# Patient Record
Sex: FEMALE | Race: WHITE | NOT HISPANIC OR LATINO | Employment: OTHER | URBAN - METROPOLITAN AREA
[De-identification: names, ages, dates, MRNs, and addresses within clinical notes are randomized per-mention and may not be internally consistent; named-entity substitution may affect disease eponyms.]

---

## 2017-07-12 ENCOUNTER — RECORDS - HEALTHEAST (OUTPATIENT)
Dept: LAB | Facility: CLINIC | Age: 77
End: 2017-07-12

## 2017-07-12 LAB
CHOLEST SERPL-MCNC: 204 MG/DL
FASTING STATUS PATIENT QL REPORTED: ABNORMAL
HDLC SERPL-MCNC: 83 MG/DL
LDLC SERPL CALC-MCNC: 108 MG/DL
TRIGL SERPL-MCNC: 65 MG/DL

## 2019-06-26 ENCOUNTER — RECORDS - HEALTHEAST (OUTPATIENT)
Dept: LAB | Facility: CLINIC | Age: 79
End: 2019-06-26

## 2019-06-26 LAB
ALBUMIN SERPL-MCNC: 3.8 G/DL (ref 3.5–5)
ALP SERPL-CCNC: 69 U/L (ref 45–120)
ALT SERPL W P-5'-P-CCNC: 22 U/L (ref 0–45)
ANION GAP SERPL CALCULATED.3IONS-SCNC: 12 MMOL/L (ref 5–18)
AST SERPL W P-5'-P-CCNC: 20 U/L (ref 0–40)
BILIRUB SERPL-MCNC: 1.1 MG/DL (ref 0–1)
BUN SERPL-MCNC: 22 MG/DL (ref 8–28)
CALCIUM SERPL-MCNC: 10.4 MG/DL (ref 8.5–10.5)
CHLORIDE BLD-SCNC: 103 MMOL/L (ref 98–107)
CHOLEST SERPL-MCNC: 221 MG/DL
CO2 SERPL-SCNC: 25 MMOL/L (ref 22–31)
CREAT SERPL-MCNC: 0.85 MG/DL (ref 0.6–1.1)
FASTING STATUS PATIENT QL REPORTED: ABNORMAL
GFR SERPL CREATININE-BSD FRML MDRD: >60 ML/MIN/1.73M2
GLUCOSE BLD-MCNC: 96 MG/DL (ref 70–125)
HDLC SERPL-MCNC: 97 MG/DL
LDLC SERPL CALC-MCNC: 114 MG/DL
POTASSIUM BLD-SCNC: 4.9 MMOL/L (ref 3.5–5)
PROT SERPL-MCNC: 7 G/DL (ref 6–8)
SODIUM SERPL-SCNC: 140 MMOL/L (ref 136–145)
TRIGL SERPL-MCNC: 52 MG/DL
TSH SERPL DL<=0.005 MIU/L-ACNC: 2.63 UIU/ML (ref 0.3–5)

## 2020-07-23 ENCOUNTER — RECORDS - HEALTHEAST (OUTPATIENT)
Dept: LAB | Facility: CLINIC | Age: 80
End: 2020-07-23

## 2020-07-23 LAB
ALBUMIN SERPL-MCNC: 3.8 G/DL (ref 3.5–5)
ALP SERPL-CCNC: 68 U/L (ref 45–120)
ALT SERPL W P-5'-P-CCNC: 21 U/L (ref 0–45)
ANION GAP SERPL CALCULATED.3IONS-SCNC: 9 MMOL/L (ref 5–18)
AST SERPL W P-5'-P-CCNC: 17 U/L (ref 0–40)
BILIRUB SERPL-MCNC: 0.5 MG/DL (ref 0–1)
BUN SERPL-MCNC: 27 MG/DL (ref 8–28)
CALCIUM SERPL-MCNC: 9.4 MG/DL (ref 8.5–10.5)
CHLORIDE BLD-SCNC: 105 MMOL/L (ref 98–107)
CHOLEST SERPL-MCNC: 196 MG/DL
CO2 SERPL-SCNC: 24 MMOL/L (ref 22–31)
CREAT SERPL-MCNC: 0.95 MG/DL (ref 0.6–1.1)
FASTING STATUS PATIENT QL REPORTED: NORMAL
GFR SERPL CREATININE-BSD FRML MDRD: 56 ML/MIN/1.73M2
GLUCOSE BLD-MCNC: 98 MG/DL (ref 70–125)
HDLC SERPL-MCNC: 75 MG/DL
LDLC SERPL CALC-MCNC: 102 MG/DL
POTASSIUM BLD-SCNC: 4.1 MMOL/L (ref 3.5–5)
PROT SERPL-MCNC: 6.6 G/DL (ref 6–8)
SODIUM SERPL-SCNC: 138 MMOL/L (ref 136–145)
TRIGL SERPL-MCNC: 93 MG/DL
TSH SERPL DL<=0.005 MIU/L-ACNC: 2.36 UIU/ML (ref 0.3–5)

## 2020-09-03 ENCOUNTER — HOME CARE/HOSPICE - HEALTHEAST (OUTPATIENT)
Dept: HOME HEALTH SERVICES | Facility: HOME HEALTH | Age: 80
End: 2020-09-03

## 2021-05-30 ENCOUNTER — RECORDS - HEALTHEAST (OUTPATIENT)
Dept: ADMINISTRATIVE | Facility: CLINIC | Age: 81
End: 2021-05-30

## 2021-06-02 ENCOUNTER — RECORDS - HEALTHEAST (OUTPATIENT)
Dept: ADMINISTRATIVE | Facility: CLINIC | Age: 81
End: 2021-06-02

## 2021-07-13 ENCOUNTER — RECORDS - HEALTHEAST (OUTPATIENT)
Dept: ADMINISTRATIVE | Facility: CLINIC | Age: 81
End: 2021-07-13

## 2021-07-21 ENCOUNTER — RECORDS - HEALTHEAST (OUTPATIENT)
Dept: ADMINISTRATIVE | Facility: CLINIC | Age: 81
End: 2021-07-21

## 2021-08-25 ENCOUNTER — LAB REQUISITION (OUTPATIENT)
Dept: LAB | Facility: CLINIC | Age: 81
End: 2021-08-25

## 2021-08-25 DIAGNOSIS — E78.5 HYPERLIPIDEMIA, UNSPECIFIED: ICD-10-CM

## 2021-08-25 DIAGNOSIS — E03.9 HYPOTHYROIDISM, UNSPECIFIED: ICD-10-CM

## 2021-08-25 LAB
ALBUMIN SERPL-MCNC: 3.6 G/DL (ref 3.5–5)
ALP SERPL-CCNC: 97 U/L (ref 45–120)
ALT SERPL W P-5'-P-CCNC: 17 U/L (ref 0–45)
ANION GAP SERPL CALCULATED.3IONS-SCNC: 8 MMOL/L (ref 5–18)
AST SERPL W P-5'-P-CCNC: 15 U/L (ref 0–40)
BILIRUB SERPL-MCNC: 0.4 MG/DL (ref 0–1)
BUN SERPL-MCNC: 29 MG/DL (ref 8–28)
CALCIUM SERPL-MCNC: 9.5 MG/DL (ref 8.5–10.5)
CHLORIDE BLD-SCNC: 106 MMOL/L (ref 98–107)
CHOLEST SERPL-MCNC: 185 MG/DL
CO2 SERPL-SCNC: 26 MMOL/L (ref 22–31)
CREAT SERPL-MCNC: 1.14 MG/DL (ref 0.6–1.1)
FASTING STATUS PATIENT QL REPORTED: NORMAL
GFR SERPL CREATININE-BSD FRML MDRD: 45 ML/MIN/1.73M2
GLUCOSE BLD-MCNC: 114 MG/DL (ref 70–125)
HDLC SERPL-MCNC: 75 MG/DL
LDLC SERPL CALC-MCNC: 83 MG/DL
POTASSIUM BLD-SCNC: 4.5 MMOL/L (ref 3.5–5)
PROT SERPL-MCNC: 6.5 G/DL (ref 6–8)
SODIUM SERPL-SCNC: 140 MMOL/L (ref 136–145)
TRIGL SERPL-MCNC: 133 MG/DL
TSH SERPL DL<=0.005 MIU/L-ACNC: 1.94 UIU/ML (ref 0.3–5)

## 2021-08-25 PROCEDURE — 82947 ASSAY GLUCOSE BLOOD QUANT: CPT | Performed by: FAMILY MEDICINE

## 2021-08-25 PROCEDURE — 84443 ASSAY THYROID STIM HORMONE: CPT | Performed by: FAMILY MEDICINE

## 2021-08-25 PROCEDURE — 80061 LIPID PANEL: CPT | Performed by: FAMILY MEDICINE

## 2021-08-25 PROCEDURE — 82374 ASSAY BLOOD CARBON DIOXIDE: CPT | Performed by: FAMILY MEDICINE

## 2021-08-25 PROCEDURE — 36415 COLL VENOUS BLD VENIPUNCTURE: CPT | Performed by: FAMILY MEDICINE

## 2022-04-06 ENCOUNTER — LAB REQUISITION (OUTPATIENT)
Dept: LAB | Facility: CLINIC | Age: 82
End: 2022-04-06

## 2022-04-06 DIAGNOSIS — R55 SYNCOPE AND COLLAPSE: ICD-10-CM

## 2022-04-06 DIAGNOSIS — R06.02 SHORTNESS OF BREATH: ICD-10-CM

## 2022-04-06 LAB
ANION GAP SERPL CALCULATED.3IONS-SCNC: 9 MMOL/L (ref 5–18)
BNP SERPL-MCNC: 63 PG/ML (ref 0–167)
BUN SERPL-MCNC: 20 MG/DL (ref 8–28)
CALCIUM SERPL-MCNC: 9.9 MG/DL (ref 8.5–10.5)
CHLORIDE BLD-SCNC: 105 MMOL/L (ref 98–107)
CO2 SERPL-SCNC: 27 MMOL/L (ref 22–31)
CREAT SERPL-MCNC: 0.93 MG/DL (ref 0.6–1.1)
GFR SERPL CREATININE-BSD FRML MDRD: 61 ML/MIN/1.73M2
GLUCOSE BLD-MCNC: 79 MG/DL (ref 70–125)
POTASSIUM BLD-SCNC: 4.8 MMOL/L (ref 3.5–5)
SODIUM SERPL-SCNC: 141 MMOL/L (ref 136–145)
T4 FREE SERPL-MCNC: 1.05 NG/DL (ref 0.7–1.8)
TSH SERPL DL<=0.005 MIU/L-ACNC: 4.81 UIU/ML (ref 0.3–5)

## 2022-04-06 PROCEDURE — 82310 ASSAY OF CALCIUM: CPT | Performed by: NURSE PRACTITIONER

## 2022-04-06 PROCEDURE — 83880 ASSAY OF NATRIURETIC PEPTIDE: CPT | Performed by: NURSE PRACTITIONER

## 2022-04-06 PROCEDURE — 84443 ASSAY THYROID STIM HORMONE: CPT | Performed by: NURSE PRACTITIONER

## 2022-04-06 PROCEDURE — 84439 ASSAY OF FREE THYROXINE: CPT | Performed by: NURSE PRACTITIONER

## 2022-10-02 ENCOUNTER — LAB REQUISITION (OUTPATIENT)
Dept: LAB | Facility: CLINIC | Age: 82
End: 2022-10-02
Payer: COMMERCIAL

## 2022-10-02 DIAGNOSIS — R60.9 EDEMA, UNSPECIFIED: ICD-10-CM

## 2022-10-02 DIAGNOSIS — I89.0 LYMPHEDEMA, NOT ELSEWHERE CLASSIFIED: ICD-10-CM

## 2022-10-13 LAB
ANION GAP SERPL CALCULATED.3IONS-SCNC: 12 MMOL/L (ref 7–15)
BUN SERPL-MCNC: 32.5 MG/DL (ref 8–23)
CALCIUM SERPL-MCNC: 9.5 MG/DL (ref 8.8–10.2)
CHLORIDE SERPL-SCNC: 106 MMOL/L (ref 98–107)
CREAT SERPL-MCNC: 0.98 MG/DL (ref 0.51–0.95)
DEPRECATED HCO3 PLAS-SCNC: 23 MMOL/L (ref 22–29)
GFR SERPL CREATININE-BSD FRML MDRD: 57 ML/MIN/1.73M2
GLUCOSE SERPL-MCNC: 71 MG/DL (ref 70–99)
POTASSIUM SERPL-SCNC: 4.3 MMOL/L (ref 3.4–5.3)
SODIUM SERPL-SCNC: 141 MMOL/L (ref 136–145)

## 2022-10-13 PROCEDURE — 36415 COLL VENOUS BLD VENIPUNCTURE: CPT | Mod: ORL | Performed by: NURSE PRACTITIONER

## 2022-10-13 PROCEDURE — P9604 ONE-WAY ALLOW PRORATED TRIP: HCPCS | Mod: ORL | Performed by: NURSE PRACTITIONER

## 2022-10-13 PROCEDURE — 80048 BASIC METABOLIC PNL TOTAL CA: CPT | Mod: ORL | Performed by: NURSE PRACTITIONER

## 2022-10-18 ENCOUNTER — LAB REQUISITION (OUTPATIENT)
Dept: LAB | Facility: CLINIC | Age: 82
End: 2022-10-18
Payer: COMMERCIAL

## 2022-10-18 DIAGNOSIS — F03.90 UNSPECIFIED DEMENTIA, UNSPECIFIED SEVERITY, WITHOUT BEHAVIORAL DISTURBANCE, PSYCHOTIC DISTURBANCE, MOOD DISTURBANCE, AND ANXIETY (H): ICD-10-CM

## 2022-10-18 DIAGNOSIS — R60.9 EDEMA, UNSPECIFIED: ICD-10-CM

## 2022-10-20 LAB
ANION GAP SERPL CALCULATED.3IONS-SCNC: 20 MMOL/L (ref 7–15)
BUN SERPL-MCNC: 22.5 MG/DL (ref 8–23)
CALCIUM SERPL-MCNC: 9.7 MG/DL (ref 8.8–10.2)
CHLORIDE SERPL-SCNC: 110 MMOL/L (ref 98–107)
CREAT SERPL-MCNC: 1.03 MG/DL (ref 0.51–0.95)
DEPRECATED HCO3 PLAS-SCNC: 19 MMOL/L (ref 22–29)
GFR SERPL CREATININE-BSD FRML MDRD: 53 ML/MIN/1.73M2
GLUCOSE SERPL-MCNC: 83 MG/DL (ref 70–99)
POTASSIUM SERPL-SCNC: 4.8 MMOL/L (ref 3.4–5.3)
SODIUM SERPL-SCNC: 149 MMOL/L (ref 136–145)
TSH SERPL DL<=0.005 MIU/L-ACNC: 8.22 UIU/ML (ref 0.3–4.2)

## 2022-10-20 PROCEDURE — P9603 ONE-WAY ALLOW PRORATED MILES: HCPCS | Mod: ORL | Performed by: NURSE PRACTITIONER

## 2022-10-20 PROCEDURE — 36415 COLL VENOUS BLD VENIPUNCTURE: CPT | Mod: ORL | Performed by: NURSE PRACTITIONER

## 2022-10-20 PROCEDURE — 80048 BASIC METABOLIC PNL TOTAL CA: CPT | Mod: ORL | Performed by: NURSE PRACTITIONER

## 2022-10-20 PROCEDURE — 84443 ASSAY THYROID STIM HORMONE: CPT | Mod: ORL | Performed by: NURSE PRACTITIONER

## 2022-10-31 ENCOUNTER — LAB REQUISITION (OUTPATIENT)
Dept: LAB | Facility: CLINIC | Age: 82
End: 2022-10-31
Payer: COMMERCIAL

## 2022-10-31 DIAGNOSIS — F03.90 UNSPECIFIED DEMENTIA, UNSPECIFIED SEVERITY, WITHOUT BEHAVIORAL DISTURBANCE, PSYCHOTIC DISTURBANCE, MOOD DISTURBANCE, AND ANXIETY (H): ICD-10-CM

## 2022-10-31 DIAGNOSIS — R60.9 EDEMA, UNSPECIFIED: ICD-10-CM

## 2022-11-10 LAB
ANION GAP SERPL CALCULATED.3IONS-SCNC: 14 MMOL/L (ref 7–15)
BUN SERPL-MCNC: 32.5 MG/DL (ref 8–23)
CALCIUM SERPL-MCNC: 9.8 MG/DL (ref 8.8–10.2)
CHLORIDE SERPL-SCNC: 103 MMOL/L (ref 98–107)
CREAT SERPL-MCNC: 1.04 MG/DL (ref 0.51–0.95)
DEPRECATED HCO3 PLAS-SCNC: 24 MMOL/L (ref 22–29)
GFR SERPL CREATININE-BSD FRML MDRD: 53 ML/MIN/1.73M2
GLUCOSE SERPL-MCNC: 74 MG/DL (ref 70–99)
POTASSIUM SERPL-SCNC: 4.4 MMOL/L (ref 3.4–5.3)
SODIUM SERPL-SCNC: 141 MMOL/L (ref 136–145)

## 2022-11-10 PROCEDURE — 80048 BASIC METABOLIC PNL TOTAL CA: CPT | Mod: ORL | Performed by: NURSE PRACTITIONER

## 2022-11-10 PROCEDURE — P9603 ONE-WAY ALLOW PRORATED MILES: HCPCS | Mod: ORL | Performed by: NURSE PRACTITIONER

## 2022-11-10 PROCEDURE — 36415 COLL VENOUS BLD VENIPUNCTURE: CPT | Mod: ORL | Performed by: NURSE PRACTITIONER

## 2022-12-15 ENCOUNTER — LAB REQUISITION (OUTPATIENT)
Dept: LAB | Facility: CLINIC | Age: 82
End: 2022-12-15
Payer: COMMERCIAL

## 2022-12-15 PROCEDURE — U0003 INFECTIOUS AGENT DETECTION BY NUCLEIC ACID (DNA OR RNA); SEVERE ACUTE RESPIRATORY SYNDROME CORONAVIRUS 2 (SARS-COV-2) (CORONAVIRUS DISEASE [COVID-19]), AMPLIFIED PROBE TECHNIQUE, MAKING USE OF HIGH THROUGHPUT TECHNOLOGIES AS DESCRIBED BY CMS-2020-01-R: HCPCS | Mod: ORL | Performed by: NURSE PRACTITIONER

## 2022-12-16 LAB — SARS-COV-2 RNA RESP QL NAA+PROBE: POSITIVE

## 2023-01-01 ENCOUNTER — APPOINTMENT (OUTPATIENT)
Dept: PHYSICAL THERAPY | Facility: CLINIC | Age: 83
DRG: 480 | End: 2023-01-01
Attending: FAMILY MEDICINE
Payer: COMMERCIAL

## 2023-01-01 ENCOUNTER — TRANSITIONAL CARE UNIT VISIT (OUTPATIENT)
Dept: GERIATRICS | Facility: CLINIC | Age: 83
End: 2023-01-01
Payer: COMMERCIAL

## 2023-01-01 ENCOUNTER — LAB REQUISITION (OUTPATIENT)
Dept: LAB | Facility: CLINIC | Age: 83
End: 2023-01-01
Payer: COMMERCIAL

## 2023-01-01 ENCOUNTER — APPOINTMENT (OUTPATIENT)
Dept: RADIOLOGY | Facility: HOSPITAL | Age: 83
DRG: 470 | End: 2023-01-01
Attending: EMERGENCY MEDICINE
Payer: COMMERCIAL

## 2023-01-01 ENCOUNTER — APPOINTMENT (OUTPATIENT)
Dept: PHYSICAL THERAPY | Facility: HOSPITAL | Age: 83
DRG: 470 | End: 2023-01-01
Attending: ORTHOPAEDIC SURGERY
Payer: COMMERCIAL

## 2023-01-01 ENCOUNTER — ANCILLARY PROCEDURE (OUTPATIENT)
Dept: ULTRASOUND IMAGING | Facility: HOSPITAL | Age: 83
End: 2023-01-01
Attending: STUDENT IN AN ORGANIZED HEALTH CARE EDUCATION/TRAINING PROGRAM
Payer: COMMERCIAL

## 2023-01-01 ENCOUNTER — APPOINTMENT (OUTPATIENT)
Dept: OCCUPATIONAL THERAPY | Facility: HOSPITAL | Age: 83
DRG: 470 | End: 2023-01-01
Payer: COMMERCIAL

## 2023-01-01 ENCOUNTER — APPOINTMENT (OUTPATIENT)
Dept: RADIOLOGY | Facility: HOSPITAL | Age: 83
DRG: 470 | End: 2023-01-01
Attending: ORTHOPAEDIC SURGERY
Payer: COMMERCIAL

## 2023-01-01 ENCOUNTER — PATIENT OUTREACH (OUTPATIENT)
Dept: CARE COORDINATION | Facility: CLINIC | Age: 83
End: 2023-01-01
Payer: COMMERCIAL

## 2023-01-01 ENCOUNTER — APPOINTMENT (OUTPATIENT)
Dept: RADIOLOGY | Facility: HOSPITAL | Age: 83
End: 2023-01-01
Attending: PHYSICIAN ASSISTANT
Payer: COMMERCIAL

## 2023-01-01 ENCOUNTER — APPOINTMENT (OUTPATIENT)
Dept: CT IMAGING | Facility: HOSPITAL | Age: 83
End: 2023-01-01
Attending: FAMILY MEDICINE
Payer: COMMERCIAL

## 2023-01-01 ENCOUNTER — APPOINTMENT (OUTPATIENT)
Dept: ULTRASOUND IMAGING | Facility: HOSPITAL | Age: 83
DRG: 470 | End: 2023-01-01
Attending: PHYSICIAN ASSISTANT
Payer: COMMERCIAL

## 2023-01-01 ENCOUNTER — ANESTHESIA EVENT (OUTPATIENT)
Dept: SURGERY | Facility: HOSPITAL | Age: 83
DRG: 470 | End: 2023-01-01
Payer: COMMERCIAL

## 2023-01-01 ENCOUNTER — APPOINTMENT (OUTPATIENT)
Dept: RADIOLOGY | Facility: HOSPITAL | Age: 83
End: 2023-01-01
Attending: FAMILY MEDICINE
Payer: COMMERCIAL

## 2023-01-01 ENCOUNTER — APPOINTMENT (OUTPATIENT)
Dept: PHYSICAL THERAPY | Facility: HOSPITAL | Age: 83
DRG: 470 | End: 2023-01-01
Payer: COMMERCIAL

## 2023-01-01 ENCOUNTER — ANESTHESIA (OUTPATIENT)
Dept: SURGERY | Facility: HOSPITAL | Age: 83
DRG: 470 | End: 2023-01-01
Payer: COMMERCIAL

## 2023-01-01 ENCOUNTER — APPOINTMENT (OUTPATIENT)
Dept: RADIOLOGY | Facility: CLINIC | Age: 83
DRG: 480 | End: 2023-01-01
Attending: ORTHOPAEDIC SURGERY
Payer: COMMERCIAL

## 2023-01-01 ENCOUNTER — HOSPITAL ENCOUNTER (INPATIENT)
Facility: HOSPITAL | Age: 83
LOS: 7 days | Discharge: NURSING, REHAB OR LONG-TERM CARE | DRG: 470 | End: 2023-10-10
Attending: EMERGENCY MEDICINE | Admitting: INTERNAL MEDICINE
Payer: COMMERCIAL

## 2023-01-01 ENCOUNTER — ANESTHESIA (OUTPATIENT)
Dept: SURGERY | Facility: CLINIC | Age: 83
DRG: 480 | End: 2023-01-01
Payer: COMMERCIAL

## 2023-01-01 ENCOUNTER — HOSPITAL ENCOUNTER (INPATIENT)
Facility: CLINIC | Age: 83
LOS: 5 days | Discharge: NURSING, REHAB OR LONG-TERM CARE | DRG: 480 | End: 2023-04-20
Attending: FAMILY MEDICINE | Admitting: FAMILY MEDICINE
Payer: COMMERCIAL

## 2023-01-01 ENCOUNTER — APPOINTMENT (OUTPATIENT)
Dept: PHYSICAL THERAPY | Facility: CLINIC | Age: 83
DRG: 480 | End: 2023-01-01
Attending: ORTHOPAEDIC SURGERY
Payer: COMMERCIAL

## 2023-01-01 ENCOUNTER — HOSPITAL ENCOUNTER (EMERGENCY)
Facility: HOSPITAL | Age: 83
Discharge: ANOTHER HOSPITAL | End: 2023-04-15
Attending: FAMILY MEDICINE | Admitting: FAMILY MEDICINE
Payer: COMMERCIAL

## 2023-01-01 ENCOUNTER — ANESTHESIA EVENT (OUTPATIENT)
Dept: SURGERY | Facility: CLINIC | Age: 83
DRG: 480 | End: 2023-01-01
Payer: COMMERCIAL

## 2023-01-01 ENCOUNTER — APPOINTMENT (OUTPATIENT)
Dept: RADIOLOGY | Facility: HOSPITAL | Age: 83
DRG: 470 | End: 2023-01-01
Attending: PHYSICIAN ASSISTANT
Payer: COMMERCIAL

## 2023-01-01 ENCOUNTER — DISCHARGE SUMMARY NURSING HOME (OUTPATIENT)
Dept: GERIATRICS | Facility: CLINIC | Age: 83
End: 2023-01-01
Payer: COMMERCIAL

## 2023-01-01 ENCOUNTER — HEALTH MAINTENANCE LETTER (OUTPATIENT)
Age: 83
End: 2023-01-01

## 2023-01-01 ENCOUNTER — DOCUMENTATION ONLY (OUTPATIENT)
Dept: OTHER | Facility: CLINIC | Age: 83
End: 2023-01-01
Payer: COMMERCIAL

## 2023-01-01 VITALS
TEMPERATURE: 99.1 F | HEIGHT: 60 IN | BODY MASS INDEX: 31.41 KG/M2 | DIASTOLIC BLOOD PRESSURE: 63 MMHG | OXYGEN SATURATION: 93 % | HEART RATE: 84 BPM | WEIGHT: 160 LBS | SYSTOLIC BLOOD PRESSURE: 115 MMHG | RESPIRATION RATE: 18 BRPM

## 2023-01-01 VITALS
DIASTOLIC BLOOD PRESSURE: 65 MMHG | TEMPERATURE: 98 F | WEIGHT: 149.2 LBS | HEART RATE: 88 BPM | BODY MASS INDEX: 29.29 KG/M2 | SYSTOLIC BLOOD PRESSURE: 120 MMHG | RESPIRATION RATE: 17 BRPM | HEIGHT: 60 IN | OXYGEN SATURATION: 98 %

## 2023-01-01 VITALS
HEIGHT: 60 IN | RESPIRATION RATE: 16 BRPM | DIASTOLIC BLOOD PRESSURE: 65 MMHG | TEMPERATURE: 98.5 F | HEART RATE: 79 BPM | WEIGHT: 159.4 LBS | SYSTOLIC BLOOD PRESSURE: 101 MMHG | BODY MASS INDEX: 31.29 KG/M2 | OXYGEN SATURATION: 93 %

## 2023-01-01 VITALS
RESPIRATION RATE: 18 BRPM | WEIGHT: 157.6 LBS | OXYGEN SATURATION: 95 % | TEMPERATURE: 98 F | SYSTOLIC BLOOD PRESSURE: 104 MMHG | HEART RATE: 95 BPM | BODY MASS INDEX: 30.94 KG/M2 | DIASTOLIC BLOOD PRESSURE: 68 MMHG | HEIGHT: 60 IN

## 2023-01-01 VITALS
HEART RATE: 89 BPM | TEMPERATURE: 96.8 F | OXYGEN SATURATION: 95 % | BODY MASS INDEX: 32.34 KG/M2 | WEIGHT: 165.6 LBS | SYSTOLIC BLOOD PRESSURE: 114 MMHG | DIASTOLIC BLOOD PRESSURE: 59 MMHG | RESPIRATION RATE: 18 BRPM

## 2023-01-01 VITALS
DIASTOLIC BLOOD PRESSURE: 97 MMHG | HEART RATE: 80 BPM | OXYGEN SATURATION: 96 % | RESPIRATION RATE: 20 BRPM | SYSTOLIC BLOOD PRESSURE: 154 MMHG

## 2023-01-01 VITALS
TEMPERATURE: 98.9 F | HEIGHT: 60 IN | DIASTOLIC BLOOD PRESSURE: 69 MMHG | HEART RATE: 94 BPM | SYSTOLIC BLOOD PRESSURE: 138 MMHG | BODY MASS INDEX: 34.99 KG/M2 | WEIGHT: 178.2 LBS | RESPIRATION RATE: 18 BRPM | OXYGEN SATURATION: 96 %

## 2023-01-01 VITALS
BODY MASS INDEX: 29.76 KG/M2 | HEART RATE: 106 BPM | TEMPERATURE: 97.2 F | HEIGHT: 60 IN | SYSTOLIC BLOOD PRESSURE: 122 MMHG | OXYGEN SATURATION: 92 % | WEIGHT: 151.6 LBS | RESPIRATION RATE: 20 BRPM | DIASTOLIC BLOOD PRESSURE: 67 MMHG

## 2023-01-01 VITALS
DIASTOLIC BLOOD PRESSURE: 59 MMHG | SYSTOLIC BLOOD PRESSURE: 114 MMHG | HEART RATE: 89 BPM | RESPIRATION RATE: 18 BRPM | WEIGHT: 158 LBS | BODY MASS INDEX: 31.02 KG/M2 | HEIGHT: 60 IN | TEMPERATURE: 96.8 F | OXYGEN SATURATION: 95 %

## 2023-01-01 VITALS
HEIGHT: 60 IN | WEIGHT: 151.6 LBS | RESPIRATION RATE: 18 BRPM | BODY MASS INDEX: 29.76 KG/M2 | HEART RATE: 96 BPM | OXYGEN SATURATION: 93 % | TEMPERATURE: 98.3 F | DIASTOLIC BLOOD PRESSURE: 69 MMHG | SYSTOLIC BLOOD PRESSURE: 104 MMHG

## 2023-01-01 VITALS
SYSTOLIC BLOOD PRESSURE: 101 MMHG | TEMPERATURE: 98.5 F | OXYGEN SATURATION: 93 % | HEART RATE: 79 BPM | WEIGHT: 158 LBS | BODY MASS INDEX: 31.02 KG/M2 | DIASTOLIC BLOOD PRESSURE: 65 MMHG | RESPIRATION RATE: 16 BRPM | HEIGHT: 60 IN

## 2023-01-01 VITALS
WEIGHT: 159.6 LBS | OXYGEN SATURATION: 92 % | TEMPERATURE: 98.7 F | SYSTOLIC BLOOD PRESSURE: 120 MMHG | DIASTOLIC BLOOD PRESSURE: 60 MMHG | RESPIRATION RATE: 18 BRPM | BODY MASS INDEX: 31.17 KG/M2 | HEART RATE: 82 BPM

## 2023-01-01 VITALS
RESPIRATION RATE: 16 BRPM | SYSTOLIC BLOOD PRESSURE: 135 MMHG | BODY MASS INDEX: 32.83 KG/M2 | WEIGHT: 167.2 LBS | HEIGHT: 60 IN | DIASTOLIC BLOOD PRESSURE: 70 MMHG | HEART RATE: 164 BPM | TEMPERATURE: 98 F | OXYGEN SATURATION: 76 %

## 2023-01-01 VITALS
DIASTOLIC BLOOD PRESSURE: 79 MMHG | RESPIRATION RATE: 20 BRPM | BODY MASS INDEX: 29.06 KG/M2 | SYSTOLIC BLOOD PRESSURE: 113 MMHG | WEIGHT: 148 LBS | HEIGHT: 60 IN | TEMPERATURE: 98.1 F | OXYGEN SATURATION: 96 % | HEART RATE: 113 BPM

## 2023-01-01 DIAGNOSIS — S72.002K CLOSED FRACTURE OF LEFT HIP WITH NONUNION, SUBSEQUENT ENCOUNTER: Primary | ICD-10-CM

## 2023-01-01 DIAGNOSIS — Z87.81 S/P ORIF (OPEN REDUCTION INTERNAL FIXATION) FRACTURE: ICD-10-CM

## 2023-01-01 DIAGNOSIS — S72.002D CLOSED FRACTURE OF NECK OF LEFT FEMUR WITH ROUTINE HEALING: ICD-10-CM

## 2023-01-01 DIAGNOSIS — N18.31 STAGE 3A CHRONIC KIDNEY DISEASE (H): ICD-10-CM

## 2023-01-01 DIAGNOSIS — I10 ESSENTIAL (PRIMARY) HYPERTENSION: ICD-10-CM

## 2023-01-01 DIAGNOSIS — F03.C18 SEVERE DEMENTIA WITH OTHER BEHAVIORAL DISTURBANCE, UNSPECIFIED DEMENTIA TYPE (H): ICD-10-CM

## 2023-01-01 DIAGNOSIS — R60.0 BILATERAL LOWER EXTREMITY EDEMA: ICD-10-CM

## 2023-01-01 DIAGNOSIS — E03.9 HYPOTHYROIDISM, UNSPECIFIED: ICD-10-CM

## 2023-01-01 DIAGNOSIS — D50.0 BLOOD LOSS ANEMIA: ICD-10-CM

## 2023-01-01 DIAGNOSIS — R53.81 PHYSICAL DECONDITIONING: ICD-10-CM

## 2023-01-01 DIAGNOSIS — S72.002D CLOSED FRACTURE OF NECK OF LEFT FEMUR WITH ROUTINE HEALING: Primary | ICD-10-CM

## 2023-01-01 DIAGNOSIS — S72.002K CLOSED FRACTURE OF LEFT HIP WITH NONUNION, SUBSEQUENT ENCOUNTER: ICD-10-CM

## 2023-01-01 DIAGNOSIS — Z98.890 S/P ORIF (OPEN REDUCTION INTERNAL FIXATION) FRACTURE: ICD-10-CM

## 2023-01-01 DIAGNOSIS — S72.22XD CLOSED DISPLACED SUBTROCHANTERIC FRACTURE OF LEFT FEMUR WITH ROUTINE HEALING, SUBSEQUENT ENCOUNTER: Primary | ICD-10-CM

## 2023-01-01 DIAGNOSIS — M62.81 GENERALIZED MUSCLE WEAKNESS: ICD-10-CM

## 2023-01-01 DIAGNOSIS — R62.7 FAILURE TO THRIVE IN ADULT: ICD-10-CM

## 2023-01-01 DIAGNOSIS — D72.829 LEUKOCYTOSIS, UNSPECIFIED TYPE: ICD-10-CM

## 2023-01-01 DIAGNOSIS — F03.C18 SEVERE DEMENTIA WITH OTHER BEHAVIORAL DISTURBANCE, UNSPECIFIED DEMENTIA TYPE (H): Chronic | ICD-10-CM

## 2023-01-01 DIAGNOSIS — R41.0 DELIRIUM: ICD-10-CM

## 2023-01-01 DIAGNOSIS — I50.9 HEART FAILURE, UNSPECIFIED (H): ICD-10-CM

## 2023-01-01 DIAGNOSIS — E03.0 CONGENITAL HYPOTHYROIDISM WITH DIFFUSE GOITER: ICD-10-CM

## 2023-01-01 DIAGNOSIS — S72.102S CLOSED FRACTURE OF TROCHANTER OF LEFT FEMUR, SEQUELA: ICD-10-CM

## 2023-01-01 DIAGNOSIS — E78.5 HYPERLIPIDEMIA, UNSPECIFIED: ICD-10-CM

## 2023-01-01 DIAGNOSIS — D62 ACUTE POSTHEMORRHAGIC ANEMIA: ICD-10-CM

## 2023-01-01 DIAGNOSIS — F03.C0 SEVERE DEMENTIA, UNSPECIFIED DEMENTIA TYPE, UNSPECIFIED WHETHER BEHAVIORAL, PSYCHOTIC, OR MOOD DISTURBANCE OR ANXIETY (H): Chronic | ICD-10-CM

## 2023-01-01 DIAGNOSIS — D62 ANEMIA DUE TO BLOOD LOSS, ACUTE: ICD-10-CM

## 2023-01-01 DIAGNOSIS — Z03.818 ENCOUNTER FOR OBSERVATION FOR SUSPECTED EXPOSURE TO OTHER BIOLOGICAL AGENTS RULED OUT: ICD-10-CM

## 2023-01-01 DIAGNOSIS — Z47.1 AFTERCARE FOLLOWING JOINT REPLACEMENT SURGERY: ICD-10-CM

## 2023-01-01 DIAGNOSIS — E02 SUBCLINICAL IODINE-DEFICIENCY HYPOTHYROIDISM: ICD-10-CM

## 2023-01-01 DIAGNOSIS — F03.90 DEMENTIA WITHOUT BEHAVIORAL DISTURBANCE, PSYCHOTIC DISTURBANCE, MOOD DISTURBANCE, OR ANXIETY, UNSPECIFIED DEMENTIA SEVERITY, UNSPECIFIED DEMENTIA TYPE (H): ICD-10-CM

## 2023-01-01 DIAGNOSIS — D75.839 THROMBOCYTOSIS: ICD-10-CM

## 2023-01-01 DIAGNOSIS — R63.4 WEIGHT LOSS: ICD-10-CM

## 2023-01-01 DIAGNOSIS — G47.00 INSOMNIA, UNSPECIFIED TYPE: ICD-10-CM

## 2023-01-01 DIAGNOSIS — S72.002A CLOSED FRACTURE OF LEFT HIP, INITIAL ENCOUNTER (H): Primary | ICD-10-CM

## 2023-01-01 DIAGNOSIS — N30.00 ACUTE CYSTITIS WITHOUT HEMATURIA: ICD-10-CM

## 2023-01-01 DIAGNOSIS — F41.9 ANXIETY: ICD-10-CM

## 2023-01-01 DIAGNOSIS — Z88.6 ALLERGY STATUS TO ANALGESIC AGENT: ICD-10-CM

## 2023-01-01 DIAGNOSIS — F32.A DEPRESSION, UNSPECIFIED DEPRESSION TYPE: ICD-10-CM

## 2023-01-01 DIAGNOSIS — E03.9 HYPOTHYROIDISM, UNSPECIFIED TYPE: ICD-10-CM

## 2023-01-01 DIAGNOSIS — S72.002A HIP FRACTURE, LEFT, CLOSED, INITIAL ENCOUNTER (H): ICD-10-CM

## 2023-01-01 DIAGNOSIS — D64.9 ANEMIA, UNSPECIFIED: ICD-10-CM

## 2023-01-01 LAB
ABO/RH(D): NORMAL
ABO/RH(D): NORMAL
ALBUMIN SERPL-MCNC: 3 G/DL (ref 3.5–5)
ALBUMIN UR-MCNC: >600 MG/DL
ALBUMIN UR-MCNC: NEGATIVE MG/DL
ALP SERPL-CCNC: 58 U/L (ref 45–120)
ALT SERPL W P-5'-P-CCNC: 14 U/L (ref 0–45)
ANION GAP SERPL CALCULATED.3IONS-SCNC: 10 MMOL/L (ref 7–15)
ANION GAP SERPL CALCULATED.3IONS-SCNC: 10 MMOL/L (ref 7–15)
ANION GAP SERPL CALCULATED.3IONS-SCNC: 11 MMOL/L (ref 7–15)
ANION GAP SERPL CALCULATED.3IONS-SCNC: 15 MMOL/L (ref 7–15)
ANION GAP SERPL CALCULATED.3IONS-SCNC: 15 MMOL/L (ref 7–15)
ANION GAP SERPL CALCULATED.3IONS-SCNC: 16 MMOL/L (ref 7–15)
ANION GAP SERPL CALCULATED.3IONS-SCNC: 6 MMOL/L (ref 7–15)
ANION GAP SERPL CALCULATED.3IONS-SCNC: 8 MMOL/L (ref 5–18)
ANION GAP SERPL CALCULATED.3IONS-SCNC: 8 MMOL/L (ref 5–18)
ANION GAP SERPL CALCULATED.3IONS-SCNC: 9 MMOL/L (ref 7–15)
ANTIBODY SCREEN: NEGATIVE
ANTIBODY SCREEN: NEGATIVE
APPEARANCE UR: ABNORMAL
APPEARANCE UR: CLEAR
AST SERPL W P-5'-P-CCNC: 28 U/L (ref 0–40)
BACTERIA #/AREA URNS HPF: ABNORMAL /HPF
BACTERIA UR CULT: ABNORMAL
BACTERIA UR CULT: NORMAL
BACTERIA UR CULT: NORMAL
BASO+EOS+MONOS # BLD AUTO: ABNORMAL 10*3/UL
BASO+EOS+MONOS NFR BLD AUTO: ABNORMAL %
BASOPHILS # BLD AUTO: 0 10E3/UL (ref 0–0.2)
BASOPHILS # BLD AUTO: 0.1 10E3/UL (ref 0–0.2)
BASOPHILS NFR BLD AUTO: 0 %
BASOPHILS NFR BLD AUTO: 1 %
BILIRUB SERPL-MCNC: 0.6 MG/DL (ref 0–1)
BILIRUB UR QL STRIP: NEGATIVE
BUN SERPL-MCNC: 16.5 MG/DL (ref 8–23)
BUN SERPL-MCNC: 19.9 MG/DL (ref 8–23)
BUN SERPL-MCNC: 22 MG/DL (ref 8–23)
BUN SERPL-MCNC: 22 MG/DL (ref 8–28)
BUN SERPL-MCNC: 22.1 MG/DL (ref 8–23)
BUN SERPL-MCNC: 26.2 MG/DL (ref 8–23)
BUN SERPL-MCNC: 26.6 MG/DL (ref 8–23)
BUN SERPL-MCNC: 27 MG/DL (ref 8–28)
BUN SERPL-MCNC: 27.8 MG/DL (ref 8–23)
BUN SERPL-MCNC: 30.3 MG/DL (ref 8–23)
BUN SERPL-MCNC: 33.3 MG/DL (ref 8–23)
BUN SERPL-MCNC: 34.2 MG/DL (ref 8–23)
BUN SERPL-MCNC: 45.5 MG/DL (ref 8–23)
CALCIUM SERPL-MCNC: 10 MG/DL (ref 8.8–10.2)
CALCIUM SERPL-MCNC: 8.5 MG/DL (ref 8.5–10.5)
CALCIUM SERPL-MCNC: 8.6 MG/DL (ref 8.8–10.2)
CALCIUM SERPL-MCNC: 8.8 MG/DL (ref 8.5–10.5)
CALCIUM SERPL-MCNC: 8.9 MG/DL (ref 8.8–10.2)
CALCIUM SERPL-MCNC: 9 MG/DL (ref 8.8–10.2)
CALCIUM SERPL-MCNC: 9.1 MG/DL (ref 8.8–10.2)
CALCIUM SERPL-MCNC: 9.2 MG/DL (ref 8.8–10.2)
CALCIUM SERPL-MCNC: 9.2 MG/DL (ref 8.8–10.2)
CALCIUM SERPL-MCNC: 9.3 MG/DL (ref 8.8–10.2)
CALCIUM SERPL-MCNC: 9.4 MG/DL (ref 8.8–10.2)
CALCIUM SERPL-MCNC: 9.6 MG/DL (ref 8.8–10.2)
CALCIUM SERPL-MCNC: 9.6 MG/DL (ref 8.8–10.2)
CHLORIDE BLD-SCNC: 107 MMOL/L (ref 98–107)
CHLORIDE BLD-SCNC: 109 MMOL/L (ref 98–107)
CHLORIDE SERPL-SCNC: 102 MMOL/L (ref 98–107)
CHLORIDE SERPL-SCNC: 104 MMOL/L (ref 98–107)
CHLORIDE SERPL-SCNC: 104 MMOL/L (ref 98–107)
CHLORIDE SERPL-SCNC: 105 MMOL/L (ref 98–107)
CHLORIDE SERPL-SCNC: 106 MMOL/L (ref 98–107)
CHLORIDE SERPL-SCNC: 107 MMOL/L (ref 98–107)
CHLORIDE SERPL-SCNC: 108 MMOL/L (ref 98–107)
CHLORIDE SERPL-SCNC: 109 MMOL/L (ref 98–107)
CHLORIDE SERPL-SCNC: 110 MMOL/L (ref 98–107)
CO2 SERPL-SCNC: 23 MMOL/L (ref 22–31)
CO2 SERPL-SCNC: 23 MMOL/L (ref 22–31)
COLOR UR AUTO: ABNORMAL
COLOR UR AUTO: YELLOW
COLOR UR AUTO: YELLOW
CREAT SERPL-MCNC: 0.83 MG/DL (ref 0.6–1.1)
CREAT SERPL-MCNC: 0.84 MG/DL (ref 0.51–0.95)
CREAT SERPL-MCNC: 0.87 MG/DL (ref 0.51–0.95)
CREAT SERPL-MCNC: 0.87 MG/DL (ref 0.51–0.95)
CREAT SERPL-MCNC: 0.87 MG/DL (ref 0.6–1.1)
CREAT SERPL-MCNC: 0.93 MG/DL (ref 0.51–0.95)
CREAT SERPL-MCNC: 0.94 MG/DL (ref 0.51–0.95)
CREAT SERPL-MCNC: 1 MG/DL (ref 0.51–0.95)
CREAT SERPL-MCNC: 1.03 MG/DL (ref 0.51–0.95)
CREAT SERPL-MCNC: 1.08 MG/DL (ref 0.51–0.95)
CREAT SERPL-MCNC: 1.11 MG/DL (ref 0.51–0.95)
CREAT SERPL-MCNC: 1.12 MG/DL (ref 0.51–0.95)
CREAT SERPL-MCNC: 1.17 MG/DL (ref 0.51–0.95)
CRP SERPL-MCNC: 109.9 MG/L
DEPRECATED HCO3 PLAS-SCNC: 19 MMOL/L (ref 22–29)
DEPRECATED HCO3 PLAS-SCNC: 23 MMOL/L (ref 22–29)
DEPRECATED HCO3 PLAS-SCNC: 24 MMOL/L (ref 22–29)
DEPRECATED HCO3 PLAS-SCNC: 25 MMOL/L (ref 22–29)
DEPRECATED HCO3 PLAS-SCNC: 26 MMOL/L (ref 22–29)
DEPRECATED HCO3 PLAS-SCNC: 26 MMOL/L (ref 22–29)
EGFRCR SERPLBLD CKD-EPI 2021: 46 ML/MIN/1.73M2
EGFRCR SERPLBLD CKD-EPI 2021: 50 ML/MIN/1.73M2
EGFRCR SERPLBLD CKD-EPI 2021: 55 ML/MIN/1.73M2
EGFRCR SERPLBLD CKD-EPI 2021: 59 ML/MIN/1.73M2
EGFRCR SERPLBLD CKD-EPI 2021: 60 ML/MIN/1.73M2
EGFRCR SERPLBLD CKD-EPI 2021: 65 ML/MIN/1.73M2
EOSINOPHIL # BLD AUTO: 0.1 10E3/UL (ref 0–0.7)
EOSINOPHIL # BLD AUTO: 0.2 10E3/UL (ref 0–0.7)
EOSINOPHIL # BLD AUTO: 0.4 10E3/UL (ref 0–0.7)
EOSINOPHIL NFR BLD AUTO: 0 %
EOSINOPHIL NFR BLD AUTO: 1 %
EOSINOPHIL NFR BLD AUTO: 4 %
ERYTHROCYTE [DISTWIDTH] IN BLOOD BY AUTOMATED COUNT: 12.7 % (ref 10–15)
ERYTHROCYTE [DISTWIDTH] IN BLOOD BY AUTOMATED COUNT: 12.8 % (ref 10–15)
ERYTHROCYTE [DISTWIDTH] IN BLOOD BY AUTOMATED COUNT: 12.9 % (ref 10–15)
ERYTHROCYTE [DISTWIDTH] IN BLOOD BY AUTOMATED COUNT: 13 % (ref 10–15)
ERYTHROCYTE [DISTWIDTH] IN BLOOD BY AUTOMATED COUNT: 13.2 % (ref 10–15)
ERYTHROCYTE [DISTWIDTH] IN BLOOD BY AUTOMATED COUNT: 13.2 % (ref 10–15)
ERYTHROCYTE [DISTWIDTH] IN BLOOD BY AUTOMATED COUNT: 13.6 % (ref 10–15)
ERYTHROCYTE [DISTWIDTH] IN BLOOD BY AUTOMATED COUNT: 13.7 % (ref 10–15)
ERYTHROCYTE [DISTWIDTH] IN BLOOD BY AUTOMATED COUNT: 14 % (ref 10–15)
ERYTHROCYTE [DISTWIDTH] IN BLOOD BY AUTOMATED COUNT: 14.2 % (ref 10–15)
ERYTHROCYTE [DISTWIDTH] IN BLOOD BY AUTOMATED COUNT: 14.2 % (ref 10–15)
ERYTHROCYTE [DISTWIDTH] IN BLOOD BY AUTOMATED COUNT: 14.6 % (ref 10–15)
ERYTHROCYTE [SEDIMENTATION RATE] IN BLOOD BY WESTERGREN METHOD: 7 MM/HR (ref 0–30)
FOLATE SERPL-MCNC: 13.7 NG/ML (ref 4.6–34.8)
GFR SERPL CREATININE-BSD FRML MDRD: 48 ML/MIN/1.73M2
GFR SERPL CREATININE-BSD FRML MDRD: 49 ML/MIN/1.73M2
GFR SERPL CREATININE-BSD FRML MDRD: 53 ML/MIN/1.73M2
GFR SERPL CREATININE-BSD FRML MDRD: 65 ML/MIN/1.73M2
GFR SERPL CREATININE-BSD FRML MDRD: 65 ML/MIN/1.73M2
GFR SERPL CREATININE-BSD FRML MDRD: 68 ML/MIN/1.73M2
GFR SERPL CREATININE-BSD FRML MDRD: 69 ML/MIN/1.73M2
GLUCOSE BLD-MCNC: 115 MG/DL (ref 70–125)
GLUCOSE BLD-MCNC: 94 MG/DL (ref 70–125)
GLUCOSE BLDC GLUCOMTR-MCNC: 102 MG/DL (ref 70–99)
GLUCOSE BLDC GLUCOMTR-MCNC: 124 MG/DL (ref 70–99)
GLUCOSE BLDC GLUCOMTR-MCNC: 136 MG/DL (ref 70–99)
GLUCOSE BLDC GLUCOMTR-MCNC: 93 MG/DL (ref 70–99)
GLUCOSE BLDC GLUCOMTR-MCNC: 93 MG/DL (ref 70–99)
GLUCOSE SERPL-MCNC: 104 MG/DL (ref 70–99)
GLUCOSE SERPL-MCNC: 108 MG/DL (ref 70–99)
GLUCOSE SERPL-MCNC: 110 MG/DL (ref 70–99)
GLUCOSE SERPL-MCNC: 129 MG/DL (ref 70–99)
GLUCOSE SERPL-MCNC: 79 MG/DL (ref 70–99)
GLUCOSE SERPL-MCNC: 85 MG/DL (ref 70–99)
GLUCOSE SERPL-MCNC: 86 MG/DL (ref 70–99)
GLUCOSE SERPL-MCNC: 88 MG/DL (ref 70–99)
GLUCOSE SERPL-MCNC: 94 MG/DL (ref 70–99)
GLUCOSE SERPL-MCNC: 94 MG/DL (ref 70–99)
GLUCOSE SERPL-MCNC: 96 MG/DL (ref 70–99)
GLUCOSE SERPL-MCNC: 99 MG/DL (ref 70–99)
GLUCOSE UR STRIP-MCNC: NEGATIVE MG/DL
HBA1C MFR BLD: 5.4 %
HCT VFR BLD AUTO: 25 % (ref 35–47)
HCT VFR BLD AUTO: 26.7 % (ref 35–47)
HCT VFR BLD AUTO: 27 % (ref 35–47)
HCT VFR BLD AUTO: 29 % (ref 35–47)
HCT VFR BLD AUTO: 31.5 % (ref 35–47)
HCT VFR BLD AUTO: 32.4 % (ref 35–47)
HCT VFR BLD AUTO: 32.9 % (ref 35–47)
HCT VFR BLD AUTO: 33.2 % (ref 35–47)
HCT VFR BLD AUTO: 35.1 % (ref 35–47)
HCT VFR BLD AUTO: 37.3 % (ref 35–47)
HCT VFR BLD AUTO: 38.3 % (ref 35–47)
HCT VFR BLD AUTO: 38.7 % (ref 35–47)
HGB BLD-MCNC: 10.1 G/DL (ref 11.7–15.7)
HGB BLD-MCNC: 10.8 G/DL (ref 11.7–15.7)
HGB BLD-MCNC: 11.1 G/DL (ref 11.7–15.7)
HGB BLD-MCNC: 11.1 G/DL (ref 11.7–15.7)
HGB BLD-MCNC: 12.1 G/DL (ref 11.7–15.7)
HGB BLD-MCNC: 12.3 G/DL (ref 11.7–15.7)
HGB BLD-MCNC: 12.7 G/DL (ref 11.7–15.7)
HGB BLD-MCNC: 8 G/DL (ref 11.7–15.7)
HGB BLD-MCNC: 8.2 G/DL (ref 11.7–15.7)
HGB BLD-MCNC: 8.3 G/DL (ref 11.7–15.7)
HGB BLD-MCNC: 8.5 G/DL (ref 11.7–15.7)
HGB BLD-MCNC: 8.5 G/DL (ref 11.7–15.7)
HGB BLD-MCNC: 8.6 G/DL (ref 11.7–15.7)
HGB BLD-MCNC: 8.9 G/DL (ref 11.7–15.7)
HGB BLD-MCNC: 9.1 G/DL (ref 11.7–15.7)
HGB BLD-MCNC: 9.6 G/DL (ref 11.7–15.7)
HGB BLD-MCNC: 9.7 G/DL (ref 11.7–15.7)
HGB BLD-MCNC: 9.7 G/DL (ref 11.7–15.7)
HGB BLD-MCNC: 9.8 G/DL (ref 11.7–15.7)
HGB BLD-MCNC: 9.8 G/DL (ref 11.7–15.7)
HGB UR QL STRIP: NEGATIVE
HOLD SPECIMEN: NORMAL
HYALINE CASTS: 3 /LPF
IMM GRANULOCYTES # BLD: 0 10E3/UL
IMM GRANULOCYTES # BLD: 0 10E3/UL
IMM GRANULOCYTES # BLD: 0.1 10E3/UL
IMM GRANULOCYTES NFR BLD: 0 %
IMM GRANULOCYTES NFR BLD: 1 %
INR PPP: 0.98 (ref 0.85–1.15)
KETONES UR STRIP-MCNC: 10 MG/DL
KETONES UR STRIP-MCNC: 10 MG/DL
KETONES UR STRIP-MCNC: NEGATIVE MG/DL
LEUKOCYTE ESTERASE UR QL STRIP: ABNORMAL
LEUKOCYTE ESTERASE UR QL STRIP: ABNORMAL
LEUKOCYTE ESTERASE UR QL STRIP: NEGATIVE
LYMPHOCYTES # BLD AUTO: 0.9 10E3/UL (ref 0.8–5.3)
LYMPHOCYTES # BLD AUTO: 1.6 10E3/UL (ref 0.8–5.3)
LYMPHOCYTES # BLD AUTO: 1.7 10E3/UL (ref 0.8–5.3)
LYMPHOCYTES # BLD AUTO: 1.7 10E3/UL (ref 0.8–5.3)
LYMPHOCYTES # BLD AUTO: 2.1 10E3/UL (ref 0.8–5.3)
LYMPHOCYTES NFR BLD AUTO: 14 %
LYMPHOCYTES NFR BLD AUTO: 14 %
LYMPHOCYTES NFR BLD AUTO: 17 %
LYMPHOCYTES NFR BLD AUTO: 24 %
LYMPHOCYTES NFR BLD AUTO: 7 %
MAGNESIUM SERPL-MCNC: 1.8 MG/DL (ref 1.8–2.6)
MAGNESIUM SERPL-MCNC: 1.9 MG/DL (ref 1.7–2.3)
MCH RBC QN AUTO: 31.9 PG (ref 26.5–33)
MCH RBC QN AUTO: 32.3 PG (ref 26.5–33)
MCH RBC QN AUTO: 32.4 PG (ref 26.5–33)
MCH RBC QN AUTO: 32.4 PG (ref 26.5–33)
MCH RBC QN AUTO: 32.5 PG (ref 26.5–33)
MCH RBC QN AUTO: 32.6 PG (ref 26.5–33)
MCH RBC QN AUTO: 32.7 PG (ref 26.5–33)
MCH RBC QN AUTO: 33 PG (ref 26.5–33)
MCH RBC QN AUTO: 33.1 PG (ref 26.5–33)
MCH RBC QN AUTO: 33.2 PG (ref 26.5–33)
MCHC RBC AUTO-ENTMCNC: 29.5 G/DL (ref 31.5–36.5)
MCHC RBC AUTO-ENTMCNC: 30.2 G/DL (ref 31.5–36.5)
MCHC RBC AUTO-ENTMCNC: 30.4 G/DL (ref 31.5–36.5)
MCHC RBC AUTO-ENTMCNC: 30.7 G/DL (ref 31.5–36.5)
MCHC RBC AUTO-ENTMCNC: 30.7 G/DL (ref 31.5–36.5)
MCHC RBC AUTO-ENTMCNC: 30.8 G/DL (ref 31.5–36.5)
MCHC RBC AUTO-ENTMCNC: 31.6 G/DL (ref 31.5–36.5)
MCHC RBC AUTO-ENTMCNC: 32.1 G/DL (ref 31.5–36.5)
MCHC RBC AUTO-ENTMCNC: 32.4 G/DL (ref 31.5–36.5)
MCHC RBC AUTO-ENTMCNC: 32.8 G/DL (ref 31.5–36.5)
MCHC RBC AUTO-ENTMCNC: 33.7 G/DL (ref 31.5–36.5)
MCHC RBC AUTO-ENTMCNC: 34 G/DL (ref 31.5–36.5)
MCV RBC AUTO: 100 FL (ref 78–100)
MCV RBC AUTO: 101 FL (ref 78–100)
MCV RBC AUTO: 105 FL (ref 78–100)
MCV RBC AUTO: 106 FL (ref 78–100)
MCV RBC AUTO: 107 FL (ref 78–100)
MCV RBC AUTO: 107 FL (ref 78–100)
MCV RBC AUTO: 108 FL (ref 78–100)
MCV RBC AUTO: 97 FL (ref 78–100)
MCV RBC AUTO: 97 FL (ref 78–100)
MCV RBC AUTO: 99 FL (ref 78–100)
MONOCYTES # BLD AUTO: 0.9 10E3/UL (ref 0–1.3)
MONOCYTES # BLD AUTO: 0.9 10E3/UL (ref 0–1.3)
MONOCYTES # BLD AUTO: 1.2 10E3/UL (ref 0–1.3)
MONOCYTES # BLD AUTO: 1.3 10E3/UL (ref 0–1.3)
MONOCYTES # BLD AUTO: 1.5 10E3/UL (ref 0–1.3)
MONOCYTES NFR BLD AUTO: 11 %
MONOCYTES NFR BLD AUTO: 13 %
MONOCYTES NFR BLD AUTO: 16 %
MONOCYTES NFR BLD AUTO: 7 %
MONOCYTES NFR BLD AUTO: 9 %
MUCOUS THREADS #/AREA URNS LPF: PRESENT /LPF
NEUTROPHILS # BLD AUTO: 11.3 10E3/UL (ref 1.6–8.3)
NEUTROPHILS # BLD AUTO: 5 10E3/UL (ref 1.6–8.3)
NEUTROPHILS # BLD AUTO: 5.9 10E3/UL (ref 1.6–8.3)
NEUTROPHILS # BLD AUTO: 8.6 10E3/UL (ref 1.6–8.3)
NEUTROPHILS # BLD AUTO: 9.4 10E3/UL (ref 1.6–8.3)
NEUTROPHILS NFR BLD AUTO: 58 %
NEUTROPHILS NFR BLD AUTO: 67 %
NEUTROPHILS NFR BLD AUTO: 71 %
NEUTROPHILS NFR BLD AUTO: 74 %
NEUTROPHILS NFR BLD AUTO: 86 %
NITRATE UR QL: NEGATIVE
NITRATE UR QL: POSITIVE
NRBC # BLD AUTO: 0 10E3/UL
NRBC BLD AUTO-RTO: 0 /100
PH UR STRIP: 5 [PH] (ref 5–7)
PH UR STRIP: 5.5 [PH] (ref 5–7)
PH UR STRIP: 8.5 [PH] (ref 5–7)
PLATELET # BLD AUTO: 161 10E3/UL (ref 150–450)
PLATELET # BLD AUTO: 172 10E3/UL (ref 150–450)
PLATELET # BLD AUTO: 174 10E3/UL (ref 150–450)
PLATELET # BLD AUTO: 218 10E3/UL (ref 150–450)
PLATELET # BLD AUTO: 251 10E3/UL (ref 150–450)
PLATELET # BLD AUTO: 264 10E3/UL (ref 150–450)
PLATELET # BLD AUTO: 270 10E3/UL (ref 150–450)
PLATELET # BLD AUTO: 320 10E3/UL (ref 150–450)
PLATELET # BLD AUTO: 364 10E3/UL (ref 150–450)
PLATELET # BLD AUTO: 429 10E3/UL (ref 150–450)
PLATELET # BLD AUTO: 455 10E3/UL (ref 150–450)
PLATELET # BLD AUTO: 455 10E3/UL (ref 150–450)
POTASSIUM BLD-SCNC: 4.2 MMOL/L (ref 3.5–5)
POTASSIUM BLD-SCNC: 4.3 MMOL/L (ref 3.5–5)
POTASSIUM SERPL-SCNC: 4 MMOL/L (ref 3.4–5.3)
POTASSIUM SERPL-SCNC: 4.1 MMOL/L (ref 3.4–5.3)
POTASSIUM SERPL-SCNC: 4.1 MMOL/L (ref 3.4–5.3)
POTASSIUM SERPL-SCNC: 4.3 MMOL/L (ref 3.4–5.3)
POTASSIUM SERPL-SCNC: 4.3 MMOL/L (ref 3.4–5.3)
POTASSIUM SERPL-SCNC: 4.4 MMOL/L (ref 3.4–5.3)
POTASSIUM SERPL-SCNC: 4.5 MMOL/L (ref 3.4–5.3)
POTASSIUM SERPL-SCNC: 4.8 MMOL/L (ref 3.4–5.3)
POTASSIUM SERPL-SCNC: 4.9 MMOL/L (ref 3.4–5.3)
POTASSIUM SERPL-SCNC: 5.2 MMOL/L (ref 3.4–5.3)
PROT SERPL-MCNC: 5.5 G/DL (ref 6–8)
RBC # BLD AUTO: 2.51 10E6/UL (ref 3.8–5.2)
RBC # BLD AUTO: 2.57 10E6/UL (ref 3.8–5.2)
RBC # BLD AUTO: 2.75 10E6/UL (ref 3.8–5.2)
RBC # BLD AUTO: 2.79 10E6/UL (ref 3.8–5.2)
RBC # BLD AUTO: 2.94 10E6/UL (ref 3.8–5.2)
RBC # BLD AUTO: 3.02 10E6/UL (ref 3.8–5.2)
RBC # BLD AUTO: 3.04 10E6/UL (ref 3.8–5.2)
RBC # BLD AUTO: 3.12 10E6/UL (ref 3.8–5.2)
RBC # BLD AUTO: 3.34 10E6/UL (ref 3.8–5.2)
RBC # BLD AUTO: 3.72 10E6/UL (ref 3.8–5.2)
RBC # BLD AUTO: 3.81 10E6/UL (ref 3.8–5.2)
RBC # BLD AUTO: 3.91 10E6/UL (ref 3.8–5.2)
RBC URINE: 1 /HPF
RBC URINE: 2 /HPF
SARS-COV-2 RNA RESP QL NAA+PROBE: POSITIVE
SODIUM SERPL-SCNC: 138 MMOL/L (ref 135–145)
SODIUM SERPL-SCNC: 138 MMOL/L (ref 136–145)
SODIUM SERPL-SCNC: 138 MMOL/L (ref 136–145)
SODIUM SERPL-SCNC: 139 MMOL/L (ref 135–145)
SODIUM SERPL-SCNC: 139 MMOL/L (ref 136–145)
SODIUM SERPL-SCNC: 140 MMOL/L (ref 136–145)
SODIUM SERPL-SCNC: 141 MMOL/L (ref 135–145)
SODIUM SERPL-SCNC: 141 MMOL/L (ref 136–145)
SODIUM SERPL-SCNC: 142 MMOL/L (ref 135–145)
SODIUM SERPL-SCNC: 142 MMOL/L (ref 136–145)
SODIUM SERPL-SCNC: 142 MMOL/L (ref 136–145)
SODIUM SERPL-SCNC: 145 MMOL/L (ref 135–145)
SODIUM SERPL-SCNC: 147 MMOL/L (ref 135–145)
SP GR UR STRIP: 1.01 (ref 1–1.03)
SP GR UR STRIP: 1.01 (ref 1–1.03)
SP GR UR STRIP: 1.02 (ref 1–1.03)
SP GR UR STRIP: 1.02 (ref 1–1.03)
SP GR UR STRIP: 1.03 (ref 1–1.03)
SPECIMEN EXPIRATION DATE: NORMAL
SPECIMEN EXPIRATION DATE: NORMAL
SQUAMOUS EPITHELIAL: 1 /HPF
SQUAMOUS EPITHELIAL: 1 /HPF
SQUAMOUS EPITHELIAL: 3 /HPF
SQUAMOUS EPITHELIAL: 6 /HPF
SQUAMOUS EPITHELIAL: <1 /HPF
TRI-PHOS CRY #/AREA URNS HPF: ABNORMAL /HPF
TROPONIN I SERPL-MCNC: 0.02 NG/ML (ref 0–0.29)
TROPONIN T SERPL HS-MCNC: 23 NG/L
TROPONIN T SERPL HS-MCNC: 24 NG/L
TSH SERPL DL<=0.005 MIU/L-ACNC: 10.02 UIU/ML (ref 0.3–5)
TSH SERPL DL<=0.005 MIU/L-ACNC: 15 UIU/ML (ref 0.3–4.2)
TSH SERPL DL<=0.005 MIU/L-ACNC: 19 UIU/ML (ref 0.3–4.2)
TSH SERPL DL<=0.005 MIU/L-ACNC: 3.18 UIU/ML (ref 0.3–4.2)
TSH SERPL DL<=0.005 MIU/L-ACNC: 4.42 UIU/ML (ref 0.3–4.2)
TSH SERPL DL<=0.005 MIU/L-ACNC: 9.82 UIU/ML (ref 0.3–4.2)
UROBILINOGEN UR STRIP-MCNC: <2 MG/DL
UROBILINOGEN UR STRIP-MCNC: NORMAL MG/DL
UROBILINOGEN UR STRIP-MCNC: NORMAL MG/DL
VIT B12 SERPL-MCNC: 548 PG/ML (ref 232–1245)
WBC # BLD AUTO: 10.1 10E3/UL (ref 4–11)
WBC # BLD AUTO: 10.1 10E3/UL (ref 4–11)
WBC # BLD AUTO: 10.2 10E3/UL (ref 4–11)
WBC # BLD AUTO: 10.5 10E3/UL (ref 4–11)
WBC # BLD AUTO: 11.3 10E3/UL (ref 4–11)
WBC # BLD AUTO: 12 10E3/UL (ref 4–11)
WBC # BLD AUTO: 12.6 10E3/UL (ref 4–11)
WBC # BLD AUTO: 13.2 10E3/UL (ref 4–11)
WBC # BLD AUTO: 17.5 10E3/UL (ref 4–11)
WBC # BLD AUTO: 8.5 10E3/UL (ref 4–11)
WBC # BLD AUTO: 9 10E3/UL (ref 4–11)
WBC # BLD AUTO: 9.1 10E3/UL (ref 4–11)
WBC URINE: 1 /HPF
WBC URINE: 16 /HPF
WBC URINE: 36 /HPF

## 2023-01-01 PROCEDURE — 250N000013 HC RX MED GY IP 250 OP 250 PS 637: Performed by: FAMILY MEDICINE

## 2023-01-01 PROCEDURE — P9045 ALBUMIN (HUMAN), 5%, 250 ML: HCPCS | Mod: JZ | Performed by: NURSE ANESTHETIST, CERTIFIED REGISTERED

## 2023-01-01 PROCEDURE — 250N000013 HC RX MED GY IP 250 OP 250 PS 637: Performed by: INTERNAL MEDICINE

## 2023-01-01 PROCEDURE — 85027 COMPLETE CBC AUTOMATED: CPT | Mod: ORL | Performed by: NURSE PRACTITIONER

## 2023-01-01 PROCEDURE — 85027 COMPLETE CBC AUTOMATED: CPT | Mod: ORL | Performed by: FAMILY MEDICINE

## 2023-01-01 PROCEDURE — 99310 SBSQ NF CARE HIGH MDM 45: CPT | Performed by: NURSE PRACTITIONER

## 2023-01-01 PROCEDURE — 97116 GAIT TRAINING THERAPY: CPT | Mod: GP

## 2023-01-01 PROCEDURE — 36415 COLL VENOUS BLD VENIPUNCTURE: CPT | Mod: ORL | Performed by: NURSE PRACTITIONER

## 2023-01-01 PROCEDURE — 99222 1ST HOSP IP/OBS MODERATE 55: CPT | Performed by: NURSE PRACTITIONER

## 2023-01-01 PROCEDURE — 93971 EXTREMITY STUDY: CPT | Mod: LT

## 2023-01-01 PROCEDURE — 85018 HEMOGLOBIN: CPT | Performed by: STUDENT IN AN ORGANIZED HEALTH CARE EDUCATION/TRAINING PROGRAM

## 2023-01-01 PROCEDURE — 272N000001 HC OR GENERAL SUPPLY STERILE: Performed by: ORTHOPAEDIC SURGERY

## 2023-01-01 PROCEDURE — 250N000013 HC RX MED GY IP 250 OP 250 PS 637: Performed by: NURSE PRACTITIONER

## 2023-01-01 PROCEDURE — 83735 ASSAY OF MAGNESIUM: CPT | Performed by: FAMILY MEDICINE

## 2023-01-01 PROCEDURE — 250N000013 HC RX MED GY IP 250 OP 250 PS 637: Performed by: ORTHOPAEDIC SURGERY

## 2023-01-01 PROCEDURE — 73560 X-RAY EXAM OF KNEE 1 OR 2: CPT | Mod: LT

## 2023-01-01 PROCEDURE — 258N000003 HC RX IP 258 OP 636: Performed by: NURSE ANESTHETIST, CERTIFIED REGISTERED

## 2023-01-01 PROCEDURE — 999N000065 XR PELVIS AND HIP PORTABLE LEFT 1 VIEW

## 2023-01-01 PROCEDURE — 370N000017 HC ANESTHESIA TECHNICAL FEE, PER MIN: Performed by: ORTHOPAEDIC SURGERY

## 2023-01-01 PROCEDURE — 86901 BLOOD TYPING SEROLOGIC RH(D): CPT | Performed by: FAMILY MEDICINE

## 2023-01-01 PROCEDURE — 82310 ASSAY OF CALCIUM: CPT | Performed by: FAMILY MEDICINE

## 2023-01-01 PROCEDURE — 97535 SELF CARE MNGMENT TRAINING: CPT | Mod: GO

## 2023-01-01 PROCEDURE — 73552 X-RAY EXAM OF FEMUR 2/>: CPT | Mod: LT

## 2023-01-01 PROCEDURE — 99285 EMERGENCY DEPT VISIT HI MDM: CPT | Mod: 25

## 2023-01-01 PROCEDURE — 80048 BASIC METABOLIC PNL TOTAL CA: CPT | Mod: ORL | Performed by: NURSE PRACTITIONER

## 2023-01-01 PROCEDURE — 0QP704Z REMOVAL OF INTERNAL FIXATION DEVICE FROM LEFT UPPER FEMUR, OPEN APPROACH: ICD-10-PCS | Performed by: ORTHOPAEDIC SURGERY

## 2023-01-01 PROCEDURE — P9604 ONE-WAY ALLOW PRORATED TRIP: HCPCS | Mod: ORL | Performed by: NURSE PRACTITIONER

## 2023-01-01 PROCEDURE — P9045 ALBUMIN (HUMAN), 5%, 250 ML: HCPCS | Performed by: ANESTHESIOLOGY

## 2023-01-01 PROCEDURE — 80053 COMPREHEN METABOLIC PANEL: CPT | Performed by: INTERNAL MEDICINE

## 2023-01-01 PROCEDURE — 99239 HOSP IP/OBS DSCHRG MGMT >30: CPT | Performed by: STUDENT IN AN ORGANIZED HEALTH CARE EDUCATION/TRAINING PROGRAM

## 2023-01-01 PROCEDURE — 87635 SARS-COV-2 COVID-19 AMP PRB: CPT | Mod: ORL | Performed by: NURSE PRACTITIONER

## 2023-01-01 PROCEDURE — 36415 COLL VENOUS BLD VENIPUNCTURE: CPT | Performed by: STUDENT IN AN ORGANIZED HEALTH CARE EDUCATION/TRAINING PROGRAM

## 2023-01-01 PROCEDURE — 86901 BLOOD TYPING SEROLOGIC RH(D): CPT | Performed by: PHYSICIAN ASSISTANT

## 2023-01-01 PROCEDURE — 99309 SBSQ NF CARE MODERATE MDM 30: CPT | Performed by: NURSE PRACTITIONER

## 2023-01-01 PROCEDURE — 99223 1ST HOSP IP/OBS HIGH 75: CPT | Performed by: FAMILY MEDICINE

## 2023-01-01 PROCEDURE — 250N000025 HC SEVOFLURANE, PER MIN: Performed by: ORTHOPAEDIC SURGERY

## 2023-01-01 PROCEDURE — 84443 ASSAY THYROID STIM HORMONE: CPT | Mod: ORL | Performed by: NURSE PRACTITIONER

## 2023-01-01 PROCEDURE — 97110 THERAPEUTIC EXERCISES: CPT | Mod: GP

## 2023-01-01 PROCEDURE — 250N000011 HC RX IP 250 OP 636: Performed by: ANESTHESIOLOGY

## 2023-01-01 PROCEDURE — 250N000013 HC RX MED GY IP 250 OP 250 PS 637: Performed by: PHYSICIAN ASSISTANT

## 2023-01-01 PROCEDURE — 36415 COLL VENOUS BLD VENIPUNCTURE: CPT | Mod: ORL | Performed by: FAMILY MEDICINE

## 2023-01-01 PROCEDURE — 81001 URINALYSIS AUTO W/SCOPE: CPT | Performed by: INTERNAL MEDICINE

## 2023-01-01 PROCEDURE — 250N000011 HC RX IP 250 OP 636: Performed by: STUDENT IN AN ORGANIZED HEALTH CARE EDUCATION/TRAINING PROGRAM

## 2023-01-01 PROCEDURE — 84484 ASSAY OF TROPONIN QUANT: CPT | Performed by: INTERNAL MEDICINE

## 2023-01-01 PROCEDURE — 250N000011 HC RX IP 250 OP 636: Mod: JZ | Performed by: NURSE ANESTHETIST, CERTIFIED REGISTERED

## 2023-01-01 PROCEDURE — 250N000011 HC RX IP 250 OP 636: Performed by: ORTHOPAEDIC SURGERY

## 2023-01-01 PROCEDURE — 710N000009 HC RECOVERY PHASE 1, LEVEL 1, PER MIN: Performed by: ORTHOPAEDIC SURGERY

## 2023-01-01 PROCEDURE — 72125 CT NECK SPINE W/O DYE: CPT

## 2023-01-01 PROCEDURE — 87086 URINE CULTURE/COLONY COUNT: CPT | Performed by: FAMILY MEDICINE

## 2023-01-01 PROCEDURE — 82310 ASSAY OF CALCIUM: CPT | Performed by: STUDENT IN AN ORGANIZED HEALTH CARE EDUCATION/TRAINING PROGRAM

## 2023-01-01 PROCEDURE — 85018 HEMOGLOBIN: CPT | Performed by: ORTHOPAEDIC SURGERY

## 2023-01-01 PROCEDURE — 83735 ASSAY OF MAGNESIUM: CPT | Performed by: INTERNAL MEDICINE

## 2023-01-01 PROCEDURE — 120N000001 HC R&B MED SURG/OB

## 2023-01-01 PROCEDURE — 87186 SC STD MICRODIL/AGAR DIL: CPT | Mod: ORL | Performed by: NURSE PRACTITIONER

## 2023-01-01 PROCEDURE — 82607 VITAMIN B-12: CPT | Performed by: INTERNAL MEDICINE

## 2023-01-01 PROCEDURE — 258N000003 HC RX IP 258 OP 636: Performed by: ORTHOPAEDIC SURGERY

## 2023-01-01 PROCEDURE — 360N000084 HC SURGERY LEVEL 4 W/ FLUORO, PER MIN: Performed by: ORTHOPAEDIC SURGERY

## 2023-01-01 PROCEDURE — 97530 THERAPEUTIC ACTIVITIES: CPT | Mod: GP

## 2023-01-01 PROCEDURE — G0008 ADMIN INFLUENZA VIRUS VAC: HCPCS | Performed by: INTERNAL MEDICINE

## 2023-01-01 PROCEDURE — 87086 URINE CULTURE/COLONY COUNT: CPT | Mod: ORL | Performed by: FAMILY MEDICINE

## 2023-01-01 PROCEDURE — 250N000013 HC RX MED GY IP 250 OP 250 PS 637

## 2023-01-01 PROCEDURE — 97167 OT EVAL HIGH COMPLEX 60 MIN: CPT | Mod: GO

## 2023-01-01 PROCEDURE — 85025 COMPLETE CBC W/AUTO DIFF WBC: CPT | Mod: ORL | Performed by: FAMILY MEDICINE

## 2023-01-01 PROCEDURE — 80048 BASIC METABOLIC PNL TOTAL CA: CPT | Performed by: PHYSICIAN ASSISTANT

## 2023-01-01 PROCEDURE — 81001 URINALYSIS AUTO W/SCOPE: CPT | Mod: ORL | Performed by: NURSE PRACTITIONER

## 2023-01-01 PROCEDURE — 85025 COMPLETE CBC W/AUTO DIFF WBC: CPT | Performed by: FAMILY MEDICINE

## 2023-01-01 PROCEDURE — 250N000009 HC RX 250: Performed by: ORTHOPAEDIC SURGERY

## 2023-01-01 PROCEDURE — 81001 URINALYSIS AUTO W/SCOPE: CPT | Performed by: FAMILY MEDICINE

## 2023-01-01 PROCEDURE — 72170 X-RAY EXAM OF PELVIS: CPT

## 2023-01-01 PROCEDURE — 96374 THER/PROPH/DIAG INJ IV PUSH: CPT

## 2023-01-01 PROCEDURE — 250N000009 HC RX 250: Performed by: NURSE ANESTHETIST, CERTIFIED REGISTERED

## 2023-01-01 PROCEDURE — 250N000009 HC RX 250: Performed by: ANESTHESIOLOGY

## 2023-01-01 PROCEDURE — 84443 ASSAY THYROID STIM HORMONE: CPT | Performed by: FAMILY MEDICINE

## 2023-01-01 PROCEDURE — 36415 COLL VENOUS BLD VENIPUNCTURE: CPT | Performed by: FAMILY MEDICINE

## 2023-01-01 PROCEDURE — 258N000003 HC RX IP 258 OP 636: Performed by: FAMILY MEDICINE

## 2023-01-01 PROCEDURE — C1713 ANCHOR/SCREW BN/BN,TIS/BN: HCPCS | Performed by: ORTHOPAEDIC SURGERY

## 2023-01-01 PROCEDURE — 73502 X-RAY EXAM HIP UNI 2-3 VIEWS: CPT

## 2023-01-01 PROCEDURE — 250N000011 HC RX IP 250 OP 636: Mod: JZ | Performed by: INTERNAL MEDICINE

## 2023-01-01 PROCEDURE — 82947 ASSAY GLUCOSE BLOOD QUANT: CPT | Performed by: ORTHOPAEDIC SURGERY

## 2023-01-01 PROCEDURE — 258N000003 HC RX IP 258 OP 636: Performed by: STUDENT IN AN ORGANIZED HEALTH CARE EDUCATION/TRAINING PROGRAM

## 2023-01-01 PROCEDURE — 250N000013 HC RX MED GY IP 250 OP 250 PS 637: Performed by: EMERGENCY MEDICINE

## 2023-01-01 PROCEDURE — P9604 ONE-WAY ALLOW PRORATED TRIP: HCPCS | Mod: ORL | Performed by: FAMILY MEDICINE

## 2023-01-01 PROCEDURE — 36415 COLL VENOUS BLD VENIPUNCTURE: CPT | Performed by: PHYSICIAN ASSISTANT

## 2023-01-01 PROCEDURE — C1776 JOINT DEVICE (IMPLANTABLE): HCPCS | Performed by: ORTHOPAEDIC SURGERY

## 2023-01-01 PROCEDURE — 36415 COLL VENOUS BLD VENIPUNCTURE: CPT | Performed by: INTERNAL MEDICINE

## 2023-01-01 PROCEDURE — 80048 BASIC METABOLIC PNL TOTAL CA: CPT | Performed by: INTERNAL MEDICINE

## 2023-01-01 PROCEDURE — 250N000011 HC RX IP 250 OP 636: Performed by: FAMILY MEDICINE

## 2023-01-01 PROCEDURE — 85652 RBC SED RATE AUTOMATED: CPT | Performed by: PHYSICIAN ASSISTANT

## 2023-01-01 PROCEDURE — 85025 COMPLETE CBC W/AUTO DIFF WBC: CPT | Mod: ORL | Performed by: NURSE PRACTITIONER

## 2023-01-01 PROCEDURE — 99232 SBSQ HOSP IP/OBS MODERATE 35: CPT | Performed by: INTERNAL MEDICINE

## 2023-01-01 PROCEDURE — 99231 SBSQ HOSP IP/OBS SF/LOW 25: CPT | Performed by: INTERNAL MEDICINE

## 2023-01-01 PROCEDURE — 85025 COMPLETE CBC W/AUTO DIFF WBC: CPT | Performed by: INTERNAL MEDICINE

## 2023-01-01 PROCEDURE — 99316 NF DSCHRG MGMT 30 MIN+: CPT | Performed by: NURSE PRACTITIONER

## 2023-01-01 PROCEDURE — 85027 COMPLETE CBC AUTOMATED: CPT | Performed by: STUDENT IN AN ORGANIZED HEALTH CARE EDUCATION/TRAINING PROGRAM

## 2023-01-01 PROCEDURE — 85018 HEMOGLOBIN: CPT | Mod: ORL | Performed by: NURSE PRACTITIONER

## 2023-01-01 PROCEDURE — 90662 IIV NO PRSV INCREASED AG IM: CPT | Performed by: INTERNAL MEDICINE

## 2023-01-01 PROCEDURE — 84132 ASSAY OF SERUM POTASSIUM: CPT | Performed by: INTERNAL MEDICINE

## 2023-01-01 PROCEDURE — 250N000009 HC RX 250: Performed by: SURGERY

## 2023-01-01 PROCEDURE — 250N000011 HC RX IP 250 OP 636: Performed by: NURSE ANESTHETIST, CERTIFIED REGISTERED

## 2023-01-01 PROCEDURE — 82746 ASSAY OF FOLIC ACID SERUM: CPT | Performed by: INTERNAL MEDICINE

## 2023-01-01 PROCEDURE — 999N000182 XR SURGERY CARM FLUORO GREATER THAN 5 MIN: Mod: TC

## 2023-01-01 PROCEDURE — 84484 ASSAY OF TROPONIN QUANT: CPT | Performed by: FAMILY MEDICINE

## 2023-01-01 PROCEDURE — 99305 1ST NF CARE MODERATE MDM 35: CPT | Performed by: NURSE PRACTITIONER

## 2023-01-01 PROCEDURE — 36415 COLL VENOUS BLD VENIPUNCTURE: CPT | Performed by: ORTHOPAEDIC SURGERY

## 2023-01-01 PROCEDURE — 258N000003 HC RX IP 258 OP 636: Performed by: ANESTHESIOLOGY

## 2023-01-01 PROCEDURE — 0SSBXZZ REPOSITION LEFT HIP JOINT, EXTERNAL APPROACH: ICD-10-PCS | Performed by: ORTHOPAEDIC SURGERY

## 2023-01-01 PROCEDURE — 250N000013 HC RX MED GY IP 250 OP 250 PS 637: Performed by: STUDENT IN AN ORGANIZED HEALTH CARE EDUCATION/TRAINING PROGRAM

## 2023-01-01 PROCEDURE — 85025 COMPLETE CBC W/AUTO DIFF WBC: CPT | Performed by: PHYSICIAN ASSISTANT

## 2023-01-01 PROCEDURE — 99232 SBSQ HOSP IP/OBS MODERATE 35: CPT | Performed by: STUDENT IN AN ORGANIZED HEALTH CARE EDUCATION/TRAINING PROGRAM

## 2023-01-01 PROCEDURE — 99238 HOSP IP/OBS DSCHRG MGMT 30/<: CPT | Performed by: EMERGENCY MEDICINE

## 2023-01-01 PROCEDURE — 96376 TX/PRO/DX INJ SAME DRUG ADON: CPT

## 2023-01-01 PROCEDURE — 80048 BASIC METABOLIC PNL TOTAL CA: CPT | Mod: ORL | Performed by: FAMILY MEDICINE

## 2023-01-01 PROCEDURE — P9603 ONE-WAY ALLOW PRORATED MILES: HCPCS | Mod: ORL | Performed by: NURSE PRACTITIONER

## 2023-01-01 PROCEDURE — 85018 HEMOGLOBIN: CPT | Performed by: INTERNAL MEDICINE

## 2023-01-01 PROCEDURE — 99232 SBSQ HOSP IP/OBS MODERATE 35: CPT | Performed by: NURSE PRACTITIONER

## 2023-01-01 PROCEDURE — 250N000011 HC RX IP 250 OP 636: Performed by: HOSPITALIST

## 2023-01-01 PROCEDURE — 83036 HEMOGLOBIN GLYCOSYLATED A1C: CPT | Mod: ORL | Performed by: NURSE PRACTITIONER

## 2023-01-01 PROCEDURE — 999N000141 HC STATISTIC PRE-PROCEDURE NURSING ASSESSMENT: Performed by: ORTHOPAEDIC SURGERY

## 2023-01-01 PROCEDURE — 99306 1ST NF CARE HIGH MDM 50: CPT | Performed by: FAMILY MEDICINE

## 2023-01-01 PROCEDURE — 97162 PT EVAL MOD COMPLEX 30 MIN: CPT | Mod: GP

## 2023-01-01 PROCEDURE — 85610 PROTHROMBIN TIME: CPT | Performed by: FAMILY MEDICINE

## 2023-01-01 PROCEDURE — 250N000011 HC RX IP 250 OP 636: Performed by: INTERNAL MEDICINE

## 2023-01-01 PROCEDURE — 93005 ELECTROCARDIOGRAM TRACING: CPT | Performed by: PHYSICIAN ASSISTANT

## 2023-01-01 PROCEDURE — 93005 ELECTROCARDIOGRAM TRACING: CPT | Performed by: FAMILY MEDICINE

## 2023-01-01 PROCEDURE — 99222 1ST HOSP IP/OBS MODERATE 55: CPT | Performed by: INTERNAL MEDICINE

## 2023-01-01 PROCEDURE — 81001 URINALYSIS AUTO W/SCOPE: CPT | Mod: ORL | Performed by: FAMILY MEDICINE

## 2023-01-01 PROCEDURE — 87086 URINE CULTURE/COLONY COUNT: CPT | Mod: ORL | Performed by: NURSE PRACTITIONER

## 2023-01-01 PROCEDURE — 70450 CT HEAD/BRAIN W/O DYE: CPT

## 2023-01-01 PROCEDURE — 85027 COMPLETE CBC AUTOMATED: CPT | Performed by: INTERNAL MEDICINE

## 2023-01-01 PROCEDURE — 710N000010 HC RECOVERY PHASE 1, LEVEL 2, PER MIN: Performed by: ORTHOPAEDIC SURGERY

## 2023-01-01 PROCEDURE — 86140 C-REACTIVE PROTEIN: CPT | Performed by: PHYSICIAN ASSISTANT

## 2023-01-01 PROCEDURE — 0SRS019 REPLACEMENT OF LEFT HIP JOINT, FEMORAL SURFACE WITH METAL SYNTHETIC SUBSTITUTE, CEMENTED, OPEN APPROACH: ICD-10-PCS | Performed by: ORTHOPAEDIC SURGERY

## 2023-01-01 PROCEDURE — C1769 GUIDE WIRE: HCPCS | Performed by: ORTHOPAEDIC SURGERY

## 2023-01-01 PROCEDURE — 97161 PT EVAL LOW COMPLEX 20 MIN: CPT | Mod: GP

## 2023-01-01 PROCEDURE — 0QS704Z REPOSITION LEFT UPPER FEMUR WITH INTERNAL FIXATION DEVICE, OPEN APPROACH: ICD-10-PCS | Performed by: ORTHOPAEDIC SURGERY

## 2023-01-01 PROCEDURE — 360N000077 HC SURGERY LEVEL 4, PER MIN: Performed by: ORTHOPAEDIC SURGERY

## 2023-01-01 DEVICE — LOCKING SCREW, FULLY THREADED
Type: IMPLANTABLE DEVICE | Site: HIP | Status: NON-FUNCTIONAL
Removed: 2023-10-05

## 2023-01-01 DEVICE — LAG SCREW, TI
Type: IMPLANTABLE DEVICE | Site: HIP | Status: NON-FUNCTIONAL
Brand: GAMMA
Removed: 2023-10-05

## 2023-01-01 DEVICE — LONG NAIL KIT R1.5, TI, LEFT
Type: IMPLANTABLE DEVICE | Site: HIP | Status: NON-FUNCTIONAL
Brand: GAMMA
Removed: 2023-10-05

## 2023-01-01 DEVICE — K-WIRE
Type: IMPLANTABLE DEVICE | Site: HIP | Status: NON-FUNCTIONAL
Removed: 2023-10-05

## 2023-01-01 DEVICE — K-WIRE, STERILE
Type: IMPLANTABLE DEVICE | Site: HIP | Status: NON-FUNCTIONAL
Removed: 2023-10-05

## 2023-01-01 DEVICE — BONE CEMENT SIMPLEX FULL DOSE 6191-1-001: Type: IMPLANTABLE DEVICE | Site: HIP | Status: FUNCTIONAL

## 2023-01-01 DEVICE — PLUG CEMENT BUCK W/INSERT 18.5 71279422: Type: IMPLANTABLE DEVICE | Site: HIP | Status: FUNCTIONAL

## 2023-01-01 DEVICE — BIPOLAR COMPONENT
Type: IMPLANTABLE DEVICE | Site: HIP | Status: FUNCTIONAL
Brand: UHR

## 2023-01-01 DEVICE — CEMENTED LONG STEM - LEFT
Type: IMPLANTABLE DEVICE | Site: HIP | Status: FUNCTIONAL
Brand: OMNIFIT

## 2023-01-01 DEVICE — LOW FRICTION ION TREATMENT
Type: IMPLANTABLE DEVICE | Site: HIP | Status: FUNCTIONAL
Brand: C-TAPER HEAD

## 2023-01-01 RX ORDER — BISACODYL 10 MG
10 SUPPOSITORY, RECTAL RECTAL DAILY PRN
Status: DISCONTINUED | OUTPATIENT
Start: 2023-01-01 | End: 2023-01-01

## 2023-01-01 RX ORDER — ENOXAPARIN SODIUM 100 MG/ML
40 INJECTION SUBCUTANEOUS ONCE
Status: COMPLETED | OUTPATIENT
Start: 2023-01-01 | End: 2023-01-01

## 2023-01-01 RX ORDER — ONDANSETRON 4 MG/1
4 TABLET, ORALLY DISINTEGRATING ORAL EVERY 6 HOURS PRN
Status: DISCONTINUED | OUTPATIENT
Start: 2023-01-01 | End: 2023-01-01

## 2023-01-01 RX ORDER — HYDROMORPHONE HYDROCHLORIDE 1 MG/ML
0.4 INJECTION, SOLUTION INTRAMUSCULAR; INTRAVENOUS; SUBCUTANEOUS EVERY 5 MIN PRN
Status: DISCONTINUED | OUTPATIENT
Start: 2023-01-01 | End: 2023-01-01 | Stop reason: HOSPADM

## 2023-01-01 RX ORDER — RAMELTEON 8 MG/1
8 TABLET ORAL AT BEDTIME
Status: DISCONTINUED | OUTPATIENT
Start: 2023-01-01 | End: 2023-01-01 | Stop reason: HOSPADM

## 2023-01-01 RX ORDER — LIDOCAINE 40 MG/G
CREAM TOPICAL
Status: DISCONTINUED | OUTPATIENT
Start: 2023-01-01 | End: 2023-01-01 | Stop reason: HOSPADM

## 2023-01-01 RX ORDER — FUROSEMIDE 20 MG
40 TABLET ORAL DAILY
Status: DISCONTINUED | OUTPATIENT
Start: 2023-01-01 | End: 2023-01-01 | Stop reason: HOSPADM

## 2023-01-01 RX ORDER — HYDROMORPHONE HCL IN WATER/PF 6 MG/30 ML
0.4 PATIENT CONTROLLED ANALGESIA SYRINGE INTRAVENOUS
Status: DISCONTINUED | OUTPATIENT
Start: 2023-01-01 | End: 2023-01-01 | Stop reason: HOSPADM

## 2023-01-01 RX ORDER — CEFAZOLIN SODIUM/WATER 2 G/20 ML
2 SYRINGE (ML) INTRAVENOUS
Status: COMPLETED | OUTPATIENT
Start: 2023-01-01 | End: 2023-01-01

## 2023-01-01 RX ORDER — FENTANYL CITRATE 50 UG/ML
25 INJECTION, SOLUTION INTRAMUSCULAR; INTRAVENOUS EVERY 5 MIN PRN
Status: DISCONTINUED | OUTPATIENT
Start: 2023-01-01 | End: 2023-01-01 | Stop reason: HOSPADM

## 2023-01-01 RX ORDER — METHOCARBAMOL 500 MG/1
500 TABLET, FILM COATED ORAL EVERY 6 HOURS PRN
Status: DISCONTINUED | OUTPATIENT
Start: 2023-01-01 | End: 2023-01-01 | Stop reason: HOSPADM

## 2023-01-01 RX ORDER — DONEPEZIL HYDROCHLORIDE 5 MG/1
2.5 TABLET, ORALLY DISINTEGRATING ORAL EVERY MORNING
COMMUNITY
End: 2023-01-01

## 2023-01-01 RX ORDER — NALOXONE HYDROCHLORIDE 0.4 MG/ML
0.4 INJECTION, SOLUTION INTRAMUSCULAR; INTRAVENOUS; SUBCUTANEOUS
Status: DISCONTINUED | OUTPATIENT
Start: 2023-01-01 | End: 2023-01-01 | Stop reason: HOSPADM

## 2023-01-01 RX ORDER — HYDROMORPHONE HYDROCHLORIDE 1 MG/ML
0.3 INJECTION, SOLUTION INTRAMUSCULAR; INTRAVENOUS; SUBCUTANEOUS
Status: COMPLETED | OUTPATIENT
Start: 2023-01-01 | End: 2023-01-01

## 2023-01-01 RX ORDER — ACETAMINOPHEN 325 MG/1
975 TABLET ORAL EVERY 8 HOURS
Status: DISCONTINUED | OUTPATIENT
Start: 2023-01-01 | End: 2023-01-01

## 2023-01-01 RX ORDER — ONDANSETRON 2 MG/ML
4 INJECTION INTRAMUSCULAR; INTRAVENOUS EVERY 6 HOURS PRN
Status: DISCONTINUED | OUTPATIENT
Start: 2023-01-01 | End: 2023-01-01

## 2023-01-01 RX ORDER — ONDANSETRON 4 MG/1
4 TABLET, ORALLY DISINTEGRATING ORAL EVERY 6 HOURS PRN
Status: DISCONTINUED | OUTPATIENT
Start: 2023-01-01 | End: 2023-01-01 | Stop reason: HOSPADM

## 2023-01-01 RX ORDER — SODIUM CHLORIDE, SODIUM LACTATE, POTASSIUM CHLORIDE, CALCIUM CHLORIDE 600; 310; 30; 20 MG/100ML; MG/100ML; MG/100ML; MG/100ML
INJECTION, SOLUTION INTRAVENOUS CONTINUOUS
Status: DISCONTINUED | OUTPATIENT
Start: 2023-01-01 | End: 2023-01-01 | Stop reason: HOSPADM

## 2023-01-01 RX ORDER — DEXAMETHASONE SODIUM PHOSPHATE 4 MG/ML
INJECTION, SOLUTION INTRA-ARTICULAR; INTRALESIONAL; INTRAMUSCULAR; INTRAVENOUS; SOFT TISSUE PRN
Status: DISCONTINUED | OUTPATIENT
Start: 2023-01-01 | End: 2023-01-01

## 2023-01-01 RX ORDER — ACETAMINOPHEN 325 MG/1
975 TABLET ORAL EVERY 8 HOURS
Status: DISCONTINUED | OUTPATIENT
Start: 2023-01-01 | End: 2023-01-01 | Stop reason: HOSPADM

## 2023-01-01 RX ORDER — HALOPERIDOL 5 MG/ML
2 INJECTION INTRAMUSCULAR EVERY 6 HOURS PRN
Status: DISCONTINUED | OUTPATIENT
Start: 2023-01-01 | End: 2023-01-01

## 2023-01-01 RX ORDER — ONDANSETRON 4 MG/1
4 TABLET, ORALLY DISINTEGRATING ORAL EVERY 30 MIN PRN
Status: DISCONTINUED | OUTPATIENT
Start: 2023-01-01 | End: 2023-01-01 | Stop reason: HOSPADM

## 2023-01-01 RX ORDER — FENTANYL CITRATE 50 UG/ML
50 INJECTION, SOLUTION INTRAMUSCULAR; INTRAVENOUS EVERY 5 MIN PRN
Status: DISCONTINUED | OUTPATIENT
Start: 2023-01-01 | End: 2023-01-01 | Stop reason: HOSPADM

## 2023-01-01 RX ORDER — ACETAMINOPHEN 325 MG/1
975 TABLET ORAL EVERY 8 HOURS
Status: COMPLETED | OUTPATIENT
Start: 2023-01-01 | End: 2023-01-01

## 2023-01-01 RX ORDER — FENTANYL CITRATE-0.9 % NACL/PF 10 MCG/ML
PLASTIC BAG, INJECTION (ML) INTRAVENOUS CONTINUOUS PRN
Status: DISCONTINUED | OUTPATIENT
Start: 2023-01-01 | End: 2023-01-01

## 2023-01-01 RX ORDER — CEFAZOLIN SODIUM 1 G/3ML
1 INJECTION, POWDER, FOR SOLUTION INTRAMUSCULAR; INTRAVENOUS ONCE
Status: COMPLETED | OUTPATIENT
Start: 2023-01-01 | End: 2023-01-01

## 2023-01-01 RX ORDER — HYDROMORPHONE HCL IN WATER/PF 6 MG/30 ML
0.4 PATIENT CONTROLLED ANALGESIA SYRINGE INTRAVENOUS EVERY 5 MIN PRN
Status: DISCONTINUED | OUTPATIENT
Start: 2023-01-01 | End: 2023-01-01 | Stop reason: HOSPADM

## 2023-01-01 RX ORDER — FENTANYL CITRATE 50 UG/ML
INJECTION, SOLUTION INTRAMUSCULAR; INTRAVENOUS PRN
Status: DISCONTINUED | OUTPATIENT
Start: 2023-01-01 | End: 2023-01-01

## 2023-01-01 RX ORDER — HALOPERIDOL 0.5 MG/1
0.5 TABLET ORAL EVERY 6 HOURS PRN
Status: DISCONTINUED | OUTPATIENT
Start: 2023-01-01 | End: 2023-01-01 | Stop reason: HOSPADM

## 2023-01-01 RX ORDER — EPHEDRINE SULFATE 50 MG/ML
INJECTION, SOLUTION INTRAMUSCULAR; INTRAVENOUS; SUBCUTANEOUS PRN
Status: DISCONTINUED | OUTPATIENT
Start: 2023-01-01 | End: 2023-01-01

## 2023-01-01 RX ORDER — ONDANSETRON 2 MG/ML
INJECTION INTRAMUSCULAR; INTRAVENOUS PRN
Status: DISCONTINUED | OUTPATIENT
Start: 2023-01-01 | End: 2023-01-01

## 2023-01-01 RX ORDER — OXYCODONE HYDROCHLORIDE 5 MG/1
10 TABLET ORAL
Status: CANCELLED | OUTPATIENT
Start: 2023-01-01

## 2023-01-01 RX ORDER — ASPIRIN 81 MG/1
81 TABLET ORAL 2 TIMES DAILY
Qty: 60 TABLET | Refills: 0 | Status: SHIPPED | OUTPATIENT
Start: 2023-01-01

## 2023-01-01 RX ORDER — AMOXICILLIN 250 MG
2 CAPSULE ORAL 2 TIMES DAILY
Status: DISCONTINUED | OUTPATIENT
Start: 2023-01-01 | End: 2023-01-01

## 2023-01-01 RX ORDER — TRAMADOL HYDROCHLORIDE 50 MG/1
50 TABLET ORAL ONCE
Status: COMPLETED | OUTPATIENT
Start: 2023-01-01 | End: 2023-01-01

## 2023-01-01 RX ORDER — MULTIVITAMIN,THERAPEUTIC
1 TABLET ORAL DAILY
COMMUNITY

## 2023-01-01 RX ORDER — AMOXICILLIN 250 MG
1 CAPSULE ORAL 2 TIMES DAILY
Status: DISCONTINUED | OUTPATIENT
Start: 2023-01-01 | End: 2023-01-01

## 2023-01-01 RX ORDER — ASPIRIN 81 MG/1
81 TABLET ORAL 2 TIMES DAILY
Status: DISCONTINUED | OUTPATIENT
Start: 2023-01-01 | End: 2023-01-01 | Stop reason: HOSPADM

## 2023-01-01 RX ORDER — ONDANSETRON 2 MG/ML
4 INJECTION INTRAMUSCULAR; INTRAVENOUS EVERY 30 MIN PRN
Status: DISCONTINUED | OUTPATIENT
Start: 2023-01-01 | End: 2023-01-01 | Stop reason: HOSPADM

## 2023-01-01 RX ORDER — NALOXONE HYDROCHLORIDE 0.4 MG/ML
0.2 INJECTION, SOLUTION INTRAMUSCULAR; INTRAVENOUS; SUBCUTANEOUS
Status: DISCONTINUED | OUTPATIENT
Start: 2023-01-01 | End: 2023-01-01 | Stop reason: HOSPADM

## 2023-01-01 RX ORDER — TRAMADOL HYDROCHLORIDE 50 MG/1
50 TABLET ORAL EVERY 6 HOURS PRN
Qty: 40 TABLET | Refills: 1 | Status: SHIPPED | OUTPATIENT
Start: 2023-01-01 | End: 2023-01-01

## 2023-01-01 RX ORDER — ACETAMINOPHEN 325 MG/1
975 TABLET ORAL EVERY 8 HOURS
Qty: 60 TABLET | Refills: 0 | Status: SHIPPED | OUTPATIENT
Start: 2023-01-01

## 2023-01-01 RX ORDER — BUSPIRONE HYDROCHLORIDE 7.5 MG/1
7.5 TABLET ORAL AT BEDTIME
COMMUNITY

## 2023-01-01 RX ORDER — TRAMADOL HYDROCHLORIDE 50 MG/1
50 TABLET ORAL EVERY 6 HOURS PRN
Qty: 30 TABLET | Refills: 0 | Status: ON HOLD | OUTPATIENT
Start: 2023-01-01 | End: 2023-01-01

## 2023-01-01 RX ORDER — POLYETHYLENE GLYCOL 3350 17 G/17G
17 POWDER, FOR SOLUTION ORAL DAILY
Status: DISCONTINUED | OUTPATIENT
Start: 2023-01-01 | End: 2023-01-01

## 2023-01-01 RX ORDER — OXYCODONE HYDROCHLORIDE 5 MG/1
5 TABLET ORAL
Status: CANCELLED | OUTPATIENT
Start: 2023-01-01

## 2023-01-01 RX ORDER — ACETAMINOPHEN 500 MG
500 TABLET ORAL EVERY 4 HOURS PRN
Status: ON HOLD | COMMUNITY
End: 2023-01-01

## 2023-01-01 RX ORDER — KETAMINE HYDROCHLORIDE 10 MG/ML
INJECTION INTRAMUSCULAR; INTRAVENOUS PRN
Status: DISCONTINUED | OUTPATIENT
Start: 2023-01-01 | End: 2023-01-01

## 2023-01-01 RX ORDER — RISPERIDONE 0.25 MG/1
0.25 TABLET ORAL DAILY
COMMUNITY
Start: 2023-01-01

## 2023-01-01 RX ORDER — HALOPERIDOL 5 MG/ML
2 INJECTION INTRAMUSCULAR EVERY 6 HOURS PRN
Status: DISCONTINUED | OUTPATIENT
Start: 2023-01-01 | End: 2023-01-01 | Stop reason: HOSPADM

## 2023-01-01 RX ORDER — PROPOFOL 10 MG/ML
INJECTION, EMULSION INTRAVENOUS CONTINUOUS PRN
Status: DISCONTINUED | OUTPATIENT
Start: 2023-01-01 | End: 2023-01-01

## 2023-01-01 RX ORDER — DONEPEZIL HYDROCHLORIDE 5 MG/1
10 TABLET, FILM COATED ORAL AT BEDTIME
Status: DISCONTINUED | OUTPATIENT
Start: 2023-01-01 | End: 2023-01-01

## 2023-01-01 RX ORDER — BISACODYL 10 MG
10 SUPPOSITORY, RECTAL RECTAL DAILY PRN
Status: DISCONTINUED | OUTPATIENT
Start: 2023-01-01 | End: 2023-01-01 | Stop reason: HOSPADM

## 2023-01-01 RX ORDER — PROPOFOL 10 MG/ML
INJECTION, EMULSION INTRAVENOUS PRN
Status: DISCONTINUED | OUTPATIENT
Start: 2023-01-01 | End: 2023-01-01

## 2023-01-01 RX ORDER — ACETAMINOPHEN 500 MG
1000 TABLET ORAL EVERY 8 HOURS
Status: COMPLETED | OUTPATIENT
Start: 2023-01-01 | End: 2023-01-01

## 2023-01-01 RX ORDER — DONEPEZIL HYDROCHLORIDE 5 MG/1
10 TABLET, FILM COATED ORAL AT BEDTIME
Status: DISCONTINUED | OUTPATIENT
Start: 2023-01-01 | End: 2023-01-01 | Stop reason: HOSPADM

## 2023-01-01 RX ORDER — CEFAZOLIN SODIUM/WATER 2 G/20 ML
2 SYRINGE (ML) INTRAVENOUS
Status: DISCONTINUED | OUTPATIENT
Start: 2023-01-01 | End: 2023-01-01

## 2023-01-01 RX ORDER — FUROSEMIDE 40 MG
40 TABLET ORAL DAILY
Status: DISCONTINUED | OUTPATIENT
Start: 2023-01-01 | End: 2023-01-01 | Stop reason: HOSPADM

## 2023-01-01 RX ORDER — POLYETHYLENE GLYCOL 3350 17 G/17G
17 POWDER, FOR SOLUTION ORAL DAILY
Status: DISCONTINUED | OUTPATIENT
Start: 2023-01-01 | End: 2023-01-01 | Stop reason: HOSPADM

## 2023-01-01 RX ORDER — TRAMADOL HYDROCHLORIDE 50 MG/1
50 TABLET ORAL EVERY 6 HOURS PRN
Status: DISCONTINUED | OUTPATIENT
Start: 2023-01-01 | End: 2023-01-01 | Stop reason: HOSPADM

## 2023-01-01 RX ORDER — TROLAMINE SALICYLATE 10 G/100G
CREAM TOPICAL 2 TIMES DAILY
COMMUNITY

## 2023-01-01 RX ORDER — FENTANYL CITRATE 50 UG/ML
100 INJECTION, SOLUTION INTRAMUSCULAR; INTRAVENOUS
Status: COMPLETED | OUTPATIENT
Start: 2023-01-01 | End: 2023-01-01

## 2023-01-01 RX ORDER — ACETAMINOPHEN 325 MG/1
650 TABLET ORAL EVERY 4 HOURS PRN
Status: DISCONTINUED | OUTPATIENT
Start: 2023-01-01 | End: 2023-01-01 | Stop reason: HOSPADM

## 2023-01-01 RX ORDER — NYSTATIN 100000 [USP'U]/G
POWDER TOPICAL 2 TIMES DAILY
COMMUNITY

## 2023-01-01 RX ORDER — HYDROMORPHONE HCL IN WATER/PF 6 MG/30 ML
0.2 PATIENT CONTROLLED ANALGESIA SYRINGE INTRAVENOUS EVERY 5 MIN PRN
Status: DISCONTINUED | OUTPATIENT
Start: 2023-01-01 | End: 2023-01-01 | Stop reason: HOSPADM

## 2023-01-01 RX ORDER — LEVOFLOXACIN 500 MG/1
500 TABLET, FILM COATED ORAL DAILY
COMMUNITY

## 2023-01-01 RX ORDER — CEFAZOLIN SODIUM 1 G/3ML
1 INJECTION, POWDER, FOR SOLUTION INTRAMUSCULAR; INTRAVENOUS EVERY 8 HOURS
Status: COMPLETED | OUTPATIENT
Start: 2023-01-01 | End: 2023-01-01

## 2023-01-01 RX ORDER — QUETIAPINE FUMARATE 25 MG/1
6.25 TABLET, FILM COATED ORAL 3 TIMES DAILY PRN
Start: 2023-01-01 | End: 2023-01-01

## 2023-01-01 RX ORDER — SODIUM CHLORIDE, SODIUM LACTATE, POTASSIUM CHLORIDE, CALCIUM CHLORIDE 600; 310; 30; 20 MG/100ML; MG/100ML; MG/100ML; MG/100ML
INJECTION, SOLUTION INTRAVENOUS CONTINUOUS PRN
Status: DISCONTINUED | OUTPATIENT
Start: 2023-01-01 | End: 2023-01-01

## 2023-01-01 RX ORDER — TRAMADOL HYDROCHLORIDE 50 MG/1
25 TABLET ORAL EVERY 6 HOURS
Qty: 20 TABLET | Refills: 0 | Status: SHIPPED | OUTPATIENT
Start: 2023-01-01

## 2023-01-01 RX ORDER — QUETIAPINE FUMARATE 25 MG/1
6.25 TABLET, FILM COATED ORAL 3 TIMES DAILY
Qty: 15 TABLET | Refills: 0 | DISCHARGE
Start: 2023-01-01 | End: 2023-01-01 | Stop reason: DRUGHIGH

## 2023-01-01 RX ORDER — BUSPIRONE HYDROCHLORIDE 7.5 MG/1
7.5 TABLET ORAL AT BEDTIME
Status: DISCONTINUED | OUTPATIENT
Start: 2023-01-01 | End: 2023-01-01 | Stop reason: HOSPADM

## 2023-01-01 RX ORDER — PROCHLORPERAZINE MALEATE 5 MG
5 TABLET ORAL EVERY 6 HOURS PRN
Status: DISCONTINUED | OUTPATIENT
Start: 2023-01-01 | End: 2023-01-01

## 2023-01-01 RX ORDER — OXYCODONE HYDROCHLORIDE 5 MG/1
5 TABLET ORAL EVERY 6 HOURS PRN
Status: DISCONTINUED | OUTPATIENT
Start: 2023-01-01 | End: 2023-01-01 | Stop reason: HOSPADM

## 2023-01-01 RX ORDER — SODIUM CHLORIDE, SODIUM LACTATE, POTASSIUM CHLORIDE, CALCIUM CHLORIDE 600; 310; 30; 20 MG/100ML; MG/100ML; MG/100ML; MG/100ML
INJECTION, SOLUTION INTRAVENOUS CONTINUOUS
Status: DISCONTINUED | OUTPATIENT
Start: 2023-01-01 | End: 2023-01-01

## 2023-01-01 RX ORDER — OLANZAPINE 10 MG/2ML
2.5 INJECTION, POWDER, FOR SOLUTION INTRAMUSCULAR EVERY 6 HOURS PRN
Status: DISCONTINUED | OUTPATIENT
Start: 2023-01-01 | End: 2023-01-01 | Stop reason: HOSPADM

## 2023-01-01 RX ORDER — LOPERAMIDE HCL 2 MG
2 CAPSULE ORAL ONCE
Status: COMPLETED | OUTPATIENT
Start: 2023-01-01 | End: 2023-01-01

## 2023-01-01 RX ORDER — HYDROMORPHONE HCL IN WATER/PF 6 MG/30 ML
0.2 PATIENT CONTROLLED ANALGESIA SYRINGE INTRAVENOUS
Status: DISCONTINUED | OUTPATIENT
Start: 2023-01-01 | End: 2023-01-01

## 2023-01-01 RX ORDER — HYDROMORPHONE HYDROCHLORIDE 1 MG/ML
0.2 INJECTION, SOLUTION INTRAMUSCULAR; INTRAVENOUS; SUBCUTANEOUS EVERY 5 MIN PRN
Status: DISCONTINUED | OUTPATIENT
Start: 2023-01-01 | End: 2023-01-01 | Stop reason: HOSPADM

## 2023-01-01 RX ORDER — PROCHLORPERAZINE 25 MG
12.5 SUPPOSITORY, RECTAL RECTAL EVERY 12 HOURS PRN
Status: DISCONTINUED | OUTPATIENT
Start: 2023-01-01 | End: 2023-01-01

## 2023-01-01 RX ORDER — HALOPERIDOL 1 MG/1
2 TABLET ORAL EVERY 6 HOURS PRN
Status: DISCONTINUED | OUTPATIENT
Start: 2023-01-01 | End: 2023-01-01

## 2023-01-01 RX ORDER — PROCHLORPERAZINE MALEATE 5 MG
5 TABLET ORAL EVERY 6 HOURS PRN
Status: DISCONTINUED | OUTPATIENT
Start: 2023-01-01 | End: 2023-01-01 | Stop reason: HOSPADM

## 2023-01-01 RX ORDER — ACETAMINOPHEN 325 MG/1
650 TABLET ORAL EVERY 4 HOURS PRN
Status: DISCONTINUED | OUTPATIENT
Start: 2023-01-01 | End: 2023-01-01

## 2023-01-01 RX ORDER — LANOLIN ALCOHOL/MO/W.PET/CERES
5 CREAM (GRAM) TOPICAL AT BEDTIME
COMMUNITY
Start: 2023-01-01 | End: 2023-01-01

## 2023-01-01 RX ORDER — ONDANSETRON 2 MG/ML
4 INJECTION INTRAMUSCULAR; INTRAVENOUS EVERY 6 HOURS PRN
Status: DISCONTINUED | OUTPATIENT
Start: 2023-01-01 | End: 2023-01-01 | Stop reason: HOSPADM

## 2023-01-01 RX ORDER — FUROSEMIDE 40 MG
40 TABLET ORAL DAILY
COMMUNITY

## 2023-01-01 RX ORDER — BUPIVACAINE HYDROCHLORIDE 2.5 MG/ML
INJECTION, SOLUTION EPIDURAL; INFILTRATION; INTRACAUDAL
Status: COMPLETED | OUTPATIENT
Start: 2023-01-01 | End: 2023-01-01

## 2023-01-01 RX ORDER — CEFAZOLIN SODIUM 1 G/3ML
INJECTION, POWDER, FOR SOLUTION INTRAMUSCULAR; INTRAVENOUS PRN
Status: DISCONTINUED | OUTPATIENT
Start: 2023-01-01 | End: 2023-01-01

## 2023-01-01 RX ORDER — ACETAMINOPHEN 500 MG
1000 TABLET ORAL 3 TIMES DAILY
Status: DISCONTINUED | OUTPATIENT
Start: 2023-01-01 | End: 2023-01-01

## 2023-01-01 RX ORDER — LEVOTHYROXINE SODIUM 25 UG/1
75 TABLET ORAL DAILY
Status: DISCONTINUED | OUTPATIENT
Start: 2023-01-01 | End: 2023-01-01 | Stop reason: HOSPADM

## 2023-01-01 RX ORDER — HYDROMORPHONE HYDROCHLORIDE 1 MG/ML
0.5 INJECTION, SOLUTION INTRAMUSCULAR; INTRAVENOUS; SUBCUTANEOUS ONCE
Status: COMPLETED | OUTPATIENT
Start: 2023-01-01 | End: 2023-01-01

## 2023-01-01 RX ORDER — FENTANYL CITRATE 0.05 MG/ML
INJECTION, SOLUTION INTRAMUSCULAR; INTRAVENOUS PRN
Status: DISCONTINUED | OUTPATIENT
Start: 2023-01-01 | End: 2023-01-01

## 2023-01-01 RX ORDER — ONDANSETRON 4 MG/1
4 TABLET, ORALLY DISINTEGRATING ORAL EVERY 30 MIN PRN
Status: CANCELLED | OUTPATIENT
Start: 2023-01-01

## 2023-01-01 RX ORDER — AMOXICILLIN 250 MG
2 CAPSULE ORAL 2 TIMES DAILY PRN
Status: DISCONTINUED | OUTPATIENT
Start: 2023-01-01 | End: 2023-01-01 | Stop reason: HOSPADM

## 2023-01-01 RX ORDER — GLYCOPYRROLATE 0.2 MG/ML
INJECTION, SOLUTION INTRAMUSCULAR; INTRAVENOUS PRN
Status: DISCONTINUED | OUTPATIENT
Start: 2023-01-01 | End: 2023-01-01

## 2023-01-01 RX ORDER — RAMELTEON 8 MG/1
8 TABLET ORAL AT BEDTIME
Status: ACTIVE | OUTPATIENT
Start: 2023-01-01

## 2023-01-01 RX ORDER — SODIUM CHLORIDE 9 MG/ML
INJECTION, SOLUTION INTRAVENOUS CONTINUOUS
Status: DISCONTINUED | OUTPATIENT
Start: 2023-01-01 | End: 2023-01-01

## 2023-01-01 RX ORDER — ACETAMINOPHEN 325 MG/1
325 TABLET ORAL EVERY 4 HOURS PRN
Status: DISCONTINUED | OUTPATIENT
Start: 2023-01-01 | End: 2023-01-01 | Stop reason: HOSPADM

## 2023-01-01 RX ORDER — LIDOCAINE HYDROCHLORIDE 20 MG/ML
INJECTION, SOLUTION INFILTRATION; PERINEURAL PRN
Status: DISCONTINUED | OUTPATIENT
Start: 2023-01-01 | End: 2023-01-01

## 2023-01-01 RX ORDER — ACETAMINOPHEN 500 MG
1000 TABLET ORAL 3 TIMES DAILY
Status: ON HOLD | COMMUNITY
End: 2023-01-01

## 2023-01-01 RX ORDER — RAMELTEON 8 MG/1
8 TABLET ORAL AT BEDTIME
Qty: 30 TABLET | Refills: 0 | Status: SHIPPED | OUTPATIENT
Start: 2023-01-01 | End: 2023-01-01

## 2023-01-01 RX ORDER — DONEPEZIL HYDROCHLORIDE 5 MG/1
2.5 TABLET, FILM COATED ORAL EVERY MORNING
Qty: 30 TABLET | Refills: 0 | DISCHARGE
Start: 2023-01-01 | End: 2023-01-01

## 2023-01-01 RX ORDER — RISPERIDONE 0.25 MG/1
0.25 TABLET ORAL AT BEDTIME
Status: DISCONTINUED | OUTPATIENT
Start: 2023-01-01 | End: 2023-01-01 | Stop reason: HOSPADM

## 2023-01-01 RX ORDER — TRAMADOL HYDROCHLORIDE 50 MG/1
50 TABLET ORAL EVERY 6 HOURS PRN
Qty: 30 TABLET | Refills: 0 | Status: SHIPPED | OUTPATIENT
Start: 2023-01-01 | End: 2023-01-01

## 2023-01-01 RX ORDER — POLYETHYLENE GLYCOL 3350 17 G/17G
17 POWDER, FOR SOLUTION ORAL DAILY PRN
Status: DISCONTINUED | OUTPATIENT
Start: 2023-01-01 | End: 2023-01-01 | Stop reason: HOSPADM

## 2023-01-01 RX ORDER — DONEPEZIL HYDROCHLORIDE 10 MG/1
10 TABLET, ORALLY DISINTEGRATING ORAL AT BEDTIME
COMMUNITY

## 2023-01-01 RX ORDER — FUROSEMIDE 20 MG
40 TABLET ORAL DAILY
Status: DISCONTINUED | OUTPATIENT
Start: 2023-01-01 | End: 2023-01-01

## 2023-01-01 RX ORDER — ACETAMINOPHEN 325 MG/1
650 TABLET ORAL EVERY 4 HOURS PRN
Qty: 100 TABLET | Refills: 0
Start: 2023-01-01 | End: 2023-01-01 | Stop reason: DRUGHIGH

## 2023-01-01 RX ORDER — MECLIZINE HYDROCHLORIDE 25 MG/1
25 TABLET ORAL 3 TIMES DAILY PRN
COMMUNITY
End: 2023-01-01

## 2023-01-01 RX ORDER — AMOXICILLIN 250 MG
1 CAPSULE ORAL 2 TIMES DAILY
Status: DISCONTINUED | OUTPATIENT
Start: 2023-01-01 | End: 2023-01-01 | Stop reason: HOSPADM

## 2023-01-01 RX ORDER — BUPIVACAINE HYDROCHLORIDE 7.5 MG/ML
INJECTION, SOLUTION INTRASPINAL
Status: COMPLETED | OUTPATIENT
Start: 2023-01-01 | End: 2023-01-01

## 2023-01-01 RX ORDER — CEFAZOLIN SODIUM/WATER 2 G/20 ML
2 SYRINGE (ML) INTRAVENOUS SEE ADMIN INSTRUCTIONS
Status: DISCONTINUED | OUTPATIENT
Start: 2023-01-01 | End: 2023-01-01 | Stop reason: HOSPADM

## 2023-01-01 RX ORDER — DONEPEZIL HYDROCHLORIDE 10 MG/1
10 TABLET, FILM COATED ORAL AT BEDTIME
COMMUNITY
End: 2023-01-01

## 2023-01-01 RX ORDER — HYDROMORPHONE HYDROCHLORIDE 1 MG/ML
0.1 INJECTION, SOLUTION INTRAMUSCULAR; INTRAVENOUS; SUBCUTANEOUS
Status: DISCONTINUED | OUTPATIENT
Start: 2023-01-01 | End: 2023-01-01

## 2023-01-01 RX ORDER — ONDANSETRON 2 MG/ML
4 INJECTION INTRAMUSCULAR; INTRAVENOUS EVERY 30 MIN PRN
Status: CANCELLED | OUTPATIENT
Start: 2023-01-01

## 2023-01-01 RX ORDER — NYSTATIN 100000 [USP'U]/G
POWDER TOPICAL 2 TIMES DAILY
Status: DISCONTINUED | OUTPATIENT
Start: 2023-01-01 | End: 2023-01-01

## 2023-01-01 RX ORDER — TRAMADOL HYDROCHLORIDE 50 MG/1
50 TABLET ORAL EVERY 6 HOURS PRN
Status: DISCONTINUED | OUTPATIENT
Start: 2023-01-01 | End: 2023-01-01

## 2023-01-01 RX ORDER — HYDROMORPHONE HCL IN WATER/PF 6 MG/30 ML
0.2 PATIENT CONTROLLED ANALGESIA SYRINGE INTRAVENOUS
Status: DISCONTINUED | OUTPATIENT
Start: 2023-01-01 | End: 2023-01-01 | Stop reason: HOSPADM

## 2023-01-01 RX ORDER — LEVOTHYROXINE SODIUM 75 UG/1
75 TABLET ORAL DAILY
COMMUNITY

## 2023-01-01 RX ORDER — LEVOTHYROXINE SODIUM 50 UG/1
50 TABLET ORAL DAILY
Status: DISCONTINUED | OUTPATIENT
Start: 2023-01-01 | End: 2023-01-01 | Stop reason: HOSPADM

## 2023-01-01 RX ORDER — BUPIVACAINE HYDROCHLORIDE 5 MG/ML
INJECTION, SOLUTION EPIDURAL; INTRACAUDAL
Status: COMPLETED | OUTPATIENT
Start: 2023-01-01 | End: 2023-01-01

## 2023-01-01 RX ORDER — AMOXICILLIN 250 MG
2 CAPSULE ORAL 2 TIMES DAILY
Qty: 30 TABLET | Refills: 0 | Status: SHIPPED | OUTPATIENT
Start: 2023-01-01

## 2023-01-01 RX ORDER — LIDOCAINE HYDROCHLORIDE 10 MG/ML
INJECTION, SOLUTION EPIDURAL; INFILTRATION; INTRACAUDAL; PERINEURAL PRN
Status: DISCONTINUED | OUTPATIENT
Start: 2023-01-01 | End: 2023-01-01

## 2023-01-01 RX ORDER — TRANEXAMIC ACID 650 MG/1
1950 TABLET ORAL ONCE
Status: DISCONTINUED | OUTPATIENT
Start: 2023-01-01 | End: 2023-01-01 | Stop reason: ALTCHOICE

## 2023-01-01 RX ORDER — LANOLIN ALCOHOL/MO/W.PET/CERES
3 CREAM (GRAM) TOPICAL
COMMUNITY
End: 2023-01-01

## 2023-01-01 RX ORDER — HYDROMORPHONE HYDROCHLORIDE 1 MG/ML
0.2 INJECTION, SOLUTION INTRAMUSCULAR; INTRAVENOUS; SUBCUTANEOUS
Status: DISCONTINUED | OUTPATIENT
Start: 2023-01-01 | End: 2023-01-01

## 2023-01-01 RX ADMIN — QUETIAPINE FUMARATE 12.5 MG: 25 TABLET ORAL at 20:13

## 2023-01-01 RX ADMIN — SERTRALINE HYDROCHLORIDE 50 MG: 50 TABLET ORAL at 08:11

## 2023-01-01 RX ADMIN — ACETAMINOPHEN 1000 MG: 500 TABLET ORAL at 21:02

## 2023-01-01 RX ADMIN — DONEPEZIL HYDROCHLORIDE 10 MG: 5 TABLET, FILM COATED ORAL at 18:10

## 2023-01-01 RX ADMIN — SERTRALINE HYDROCHLORIDE 50 MG: 50 TABLET ORAL at 09:59

## 2023-01-01 RX ADMIN — DONEPEZIL HYDROCHLORIDE 2.5 MG: 5 TABLET, FILM COATED ORAL at 08:11

## 2023-01-01 RX ADMIN — TRAMADOL HYDROCHLORIDE 50 MG: 50 TABLET, COATED ORAL at 18:05

## 2023-01-01 RX ADMIN — DONEPEZIL HYDROCHLORIDE 2.5 MG: 5 TABLET, FILM COATED ORAL at 08:10

## 2023-01-01 RX ADMIN — Medication 2 G: at 12:50

## 2023-01-01 RX ADMIN — FENTANYL CITRATE 25 MCG: 50 INJECTION INTRAMUSCULAR; INTRAVENOUS at 12:21

## 2023-01-01 RX ADMIN — LEVOTHYROXINE SODIUM 75 MCG: 0.03 TABLET ORAL at 08:03

## 2023-01-01 RX ADMIN — MICONAZOLE NITRATE: 20 POWDER TOPICAL at 10:04

## 2023-01-01 RX ADMIN — ACETAMINOPHEN 975 MG: 325 TABLET ORAL at 14:01

## 2023-01-01 RX ADMIN — SERTRALINE HYDROCHLORIDE 50 MG: 50 TABLET, FILM COATED ORAL at 09:25

## 2023-01-01 RX ADMIN — ACETAMINOPHEN 1000 MG: 500 TABLET ORAL at 21:10

## 2023-01-01 RX ADMIN — FENTANYL CITRATE 100 MCG: 50 INJECTION INTRAMUSCULAR; INTRAVENOUS at 14:47

## 2023-01-01 RX ADMIN — PHENYLEPHRINE HYDROCHLORIDE 100 MCG: 10 INJECTION INTRAVENOUS at 13:05

## 2023-01-01 RX ADMIN — PHENYLEPHRINE HYDROCHLORIDE 150 MCG: 10 INJECTION INTRAVENOUS at 13:43

## 2023-01-01 RX ADMIN — TRAMADOL HYDROCHLORIDE 50 MG: 50 TABLET, COATED ORAL at 04:02

## 2023-01-01 RX ADMIN — SODIUM CHLORIDE, POTASSIUM CHLORIDE, SODIUM LACTATE AND CALCIUM CHLORIDE: 600; 310; 30; 20 INJECTION, SOLUTION INTRAVENOUS at 11:59

## 2023-01-01 RX ADMIN — LEVOTHYROXINE SODIUM 75 MCG: 0.03 TABLET ORAL at 09:07

## 2023-01-01 RX ADMIN — QUETIAPINE FUMARATE 6.25 MG: 25 TABLET ORAL at 09:26

## 2023-01-01 RX ADMIN — ASPIRIN 81 MG: 81 TABLET, COATED ORAL at 20:43

## 2023-01-01 RX ADMIN — SODIUM CHLORIDE, SODIUM LACTATE, POTASSIUM CHLORIDE, CALCIUM CHLORIDE: 600; 310; 30; 20 INJECTION, SOLUTION INTRAVENOUS at 12:20

## 2023-01-01 RX ADMIN — SENNOSIDES AND DOCUSATE SODIUM 1 TABLET: 50; 8.6 TABLET ORAL at 08:22

## 2023-01-01 RX ADMIN — ALBUMIN HUMAN: 0.05 INJECTION, SOLUTION INTRAVENOUS at 13:10

## 2023-01-01 RX ADMIN — Medication 1 MG: at 21:22

## 2023-01-01 RX ADMIN — PHENYLEPHRINE HYDROCHLORIDE 0.3 MCG/KG/MIN: 10 INJECTION INTRAVENOUS at 13:31

## 2023-01-01 RX ADMIN — FUROSEMIDE 40 MG: 20 TABLET ORAL at 08:03

## 2023-01-01 RX ADMIN — TRAMADOL HYDROCHLORIDE 50 MG: 50 TABLET, COATED ORAL at 08:04

## 2023-01-01 RX ADMIN — SERTRALINE HYDROCHLORIDE 50 MG: 50 TABLET ORAL at 09:07

## 2023-01-01 RX ADMIN — TRANEXAMIC ACID 1 G: 1 INJECTION, SOLUTION INTRAVENOUS at 13:07

## 2023-01-01 RX ADMIN — SODIUM CHLORIDE, POTASSIUM CHLORIDE, SODIUM LACTATE AND CALCIUM CHLORIDE: 600; 310; 30; 20 INJECTION, SOLUTION INTRAVENOUS at 22:51

## 2023-01-01 RX ADMIN — QUETIAPINE FUMARATE 12.5 MG: 25 TABLET ORAL at 19:26

## 2023-01-01 RX ADMIN — DONEPEZIL HYDROCHLORIDE 10 MG: 5 TABLET, FILM COATED ORAL at 21:11

## 2023-01-01 RX ADMIN — SERTRALINE HYDROCHLORIDE 50 MG: 50 TABLET, FILM COATED ORAL at 10:06

## 2023-01-01 RX ADMIN — ACETAMINOPHEN 1000 MG: 500 TABLET ORAL at 05:15

## 2023-01-01 RX ADMIN — Medication 1 MG: at 23:43

## 2023-01-01 RX ADMIN — DONEPEZIL HYDROCHLORIDE 2.5 MG: 5 TABLET, FILM COATED ORAL at 10:12

## 2023-01-01 RX ADMIN — BUSPIRONE HYDROCHLORIDE 7.5 MG: 7.5 TABLET ORAL at 21:41

## 2023-01-01 RX ADMIN — MICONAZOLE NITRATE: 20 POWDER TOPICAL at 00:35

## 2023-01-01 RX ADMIN — Medication 81 MG: at 10:03

## 2023-01-01 RX ADMIN — Medication 5 MG: at 13:00

## 2023-01-01 RX ADMIN — OXYCODONE HYDROCHLORIDE 5 MG: 5 TABLET ORAL at 10:02

## 2023-01-01 RX ADMIN — FENTANYL CITRATE 50 MCG: 50 INJECTION INTRAMUSCULAR; INTRAVENOUS at 12:40

## 2023-01-01 RX ADMIN — ACETAMINOPHEN 1000 MG: 500 TABLET ORAL at 17:31

## 2023-01-01 RX ADMIN — Medication 81 MG: at 10:12

## 2023-01-01 RX ADMIN — ACETAMINOPHEN 975 MG: 325 TABLET ORAL at 09:28

## 2023-01-01 RX ADMIN — PROPOFOL 100 MCG/KG/MIN: 10 INJECTION, EMULSION INTRAVENOUS at 12:05

## 2023-01-01 RX ADMIN — DONEPEZIL HYDROCHLORIDE 10 MG: 5 TABLET, FILM COATED ORAL at 21:23

## 2023-01-01 RX ADMIN — ONDANSETRON 4 MG: 2 INJECTION INTRAMUSCULAR; INTRAVENOUS at 15:10

## 2023-01-01 RX ADMIN — TRAMADOL HYDROCHLORIDE 50 MG: 50 TABLET ORAL at 11:56

## 2023-01-01 RX ADMIN — LEVOTHYROXINE SODIUM 50 MCG: 0.05 TABLET ORAL at 10:32

## 2023-01-01 RX ADMIN — DONEPEZIL HYDROCHLORIDE 2.5 MG: 5 TABLET, FILM COATED ORAL at 10:05

## 2023-01-01 RX ADMIN — ACETAMINOPHEN 975 MG: 325 TABLET ORAL at 10:31

## 2023-01-01 RX ADMIN — ASPIRIN 81 MG: 81 TABLET, COATED ORAL at 21:24

## 2023-01-01 RX ADMIN — DONEPEZIL HYDROCHLORIDE 10 MG: 5 TABLET, FILM COATED ORAL at 21:55

## 2023-01-01 RX ADMIN — ASPIRIN 81 MG: 81 TABLET, COATED ORAL at 22:54

## 2023-01-01 RX ADMIN — ACETAMINOPHEN 975 MG: 325 TABLET ORAL at 18:13

## 2023-01-01 RX ADMIN — POLYETHYLENE GLYCOL 3350 17 G: 17 POWDER, FOR SOLUTION ORAL at 08:22

## 2023-01-01 RX ADMIN — TRAMADOL HYDROCHLORIDE 25 MG: 50 TABLET, COATED ORAL at 03:24

## 2023-01-01 RX ADMIN — FENTANYL CITRATE 25 MCG: 50 INJECTION INTRAMUSCULAR; INTRAVENOUS at 12:23

## 2023-01-01 RX ADMIN — SENNOSIDES AND DOCUSATE SODIUM 2 TABLET: 50; 8.6 TABLET ORAL at 20:00

## 2023-01-01 RX ADMIN — DONEPEZIL HYDROCHLORIDE 2.5 MG: 5 TABLET, FILM COATED ORAL at 08:41

## 2023-01-01 RX ADMIN — LOPERAMIDE HYDROCHLORIDE 2 MG: 2 CAPSULE ORAL at 16:14

## 2023-01-01 RX ADMIN — LIDOCAINE HYDROCHLORIDE 20 MG: 10 INJECTION, SOLUTION EPIDURAL; INFILTRATION; INTRACAUDAL; PERINEURAL at 12:05

## 2023-01-01 RX ADMIN — RISPERIDONE 0.25 MG: 0.25 TABLET, FILM COATED ORAL at 21:56

## 2023-01-01 RX ADMIN — ACETAMINOPHEN 975 MG: 325 TABLET ORAL at 06:12

## 2023-01-01 RX ADMIN — DONEPEZIL HYDROCHLORIDE 10 MG: 5 TABLET, FILM COATED ORAL at 20:43

## 2023-01-01 RX ADMIN — Medication 81 MG: at 08:11

## 2023-01-01 RX ADMIN — QUETIAPINE FUMARATE 6.25 MG: 25 TABLET ORAL at 17:48

## 2023-01-01 RX ADMIN — FUROSEMIDE 40 MG: 20 TABLET ORAL at 09:06

## 2023-01-01 RX ADMIN — Medication 81 MG: at 08:04

## 2023-01-01 RX ADMIN — LEVOTHYROXINE SODIUM 75 MCG: 0.03 TABLET ORAL at 09:58

## 2023-01-01 RX ADMIN — TRAMADOL HYDROCHLORIDE 25 MG: 50 TABLET, COATED ORAL at 00:43

## 2023-01-01 RX ADMIN — ACETAMINOPHEN 650 MG: 325 TABLET ORAL at 09:57

## 2023-01-01 RX ADMIN — DEXAMETHASONE SODIUM PHOSPHATE 8 MG: 4 INJECTION, SOLUTION INTRA-ARTICULAR; INTRALESIONAL; INTRAMUSCULAR; INTRAVENOUS; SOFT TISSUE at 12:46

## 2023-01-01 RX ADMIN — CEFAZOLIN 1 G: 1 INJECTION, POWDER, FOR SOLUTION INTRAMUSCULAR; INTRAVENOUS at 20:22

## 2023-01-01 RX ADMIN — INFLUENZA A VIRUS A/VICTORIA/4897/2022 IVR-238 (H1N1) ANTIGEN (FORMALDEHYDE INACTIVATED), INFLUENZA A VIRUS A/DARWIN/9/2021 SAN-010 (H3N2) ANTIGEN (FORMALDEHYDE INACTIVATED), INFLUENZA B VIRUS B/PHUKET/3073/2013 ANTIGEN (FORMALDEHYDE INACTIVATED), AND INFLUENZA B VIRUS B/MICHIGAN/01/2021 ANTIGEN (FORMALDEHYDE INACTIVATED) 0.7 ML: 60; 60; 60; 60 INJECTION, SUSPENSION INTRAMUSCULAR at 10:03

## 2023-01-01 RX ADMIN — BUPIVACAINE HYDROCHLORIDE 15 ML: 5 INJECTION, SOLUTION EPIDURAL; INTRACAUDAL; PERINEURAL at 12:10

## 2023-01-01 RX ADMIN — LEVOTHYROXINE SODIUM 75 MCG: 0.03 TABLET ORAL at 10:13

## 2023-01-01 RX ADMIN — POLYETHYLENE GLYCOL 3350 17 G: 17 POWDER, FOR SOLUTION ORAL at 08:12

## 2023-01-01 RX ADMIN — TRAMADOL HYDROCHLORIDE 50 MG: 50 TABLET, COATED ORAL at 05:15

## 2023-01-01 RX ADMIN — ACETAMINOPHEN 325 MG: 325 TABLET ORAL at 06:08

## 2023-01-01 RX ADMIN — PHENYLEPHRINE HYDROCHLORIDE 200 MCG: 10 INJECTION INTRAVENOUS at 13:10

## 2023-01-01 RX ADMIN — DEXAMETHASONE SODIUM PHOSPHATE 10 MG: 4 INJECTION, SOLUTION INTRA-ARTICULAR; INTRALESIONAL; INTRAMUSCULAR; INTRAVENOUS; SOFT TISSUE at 12:07

## 2023-01-01 RX ADMIN — BUSPIRONE HYDROCHLORIDE 7.5 MG: 7.5 TABLET ORAL at 21:12

## 2023-01-01 RX ADMIN — ASPIRIN 81 MG: 81 TABLET, COATED ORAL at 10:32

## 2023-01-01 RX ADMIN — DONEPEZIL HYDROCHLORIDE 2.5 MG: 5 TABLET, FILM COATED ORAL at 09:07

## 2023-01-01 RX ADMIN — ROCURONIUM BROMIDE 20 MG: 50 INJECTION, SOLUTION INTRAVENOUS at 13:30

## 2023-01-01 RX ADMIN — ACETAMINOPHEN 1000 MG: 500 TABLET ORAL at 04:02

## 2023-01-01 RX ADMIN — TRAMADOL HYDROCHLORIDE 25 MG: 50 TABLET, COATED ORAL at 00:20

## 2023-01-01 RX ADMIN — ROCURONIUM BROMIDE 10 MG: 50 INJECTION, SOLUTION INTRAVENOUS at 14:17

## 2023-01-01 RX ADMIN — LEVOTHYROXINE SODIUM 50 MCG: 0.05 TABLET ORAL at 08:29

## 2023-01-01 RX ADMIN — METHOCARBAMOL TABLETS 500 MG: 500 TABLET, COATED ORAL at 10:32

## 2023-01-01 RX ADMIN — SERTRALINE HYDROCHLORIDE 50 MG: 50 TABLET ORAL at 10:13

## 2023-01-01 RX ADMIN — HYDROMORPHONE HYDROCHLORIDE 0.2 MG: 0.2 INJECTION, SOLUTION INTRAMUSCULAR; INTRAVENOUS; SUBCUTANEOUS at 18:16

## 2023-01-01 RX ADMIN — BUPIVACAINE HYDROCHLORIDE 30 ML: 2.5 INJECTION, SOLUTION EPIDURAL; INFILTRATION; INTRACAUDAL at 11:58

## 2023-01-01 RX ADMIN — SENNOSIDES AND DOCUSATE SODIUM 1 TABLET: 50; 8.6 TABLET ORAL at 09:07

## 2023-01-01 RX ADMIN — MICONAZOLE NITRATE: 20 POWDER TOPICAL at 00:43

## 2023-01-01 RX ADMIN — CEFAZOLIN 1 G: 1 INJECTION, POWDER, FOR SOLUTION INTRAMUSCULAR; INTRAVENOUS at 20:44

## 2023-01-01 RX ADMIN — BUSPIRONE HYDROCHLORIDE 7.5 MG: 7.5 TABLET ORAL at 21:23

## 2023-01-01 RX ADMIN — ALBUMIN HUMAN: 0.05 INJECTION, SOLUTION INTRAVENOUS at 14:33

## 2023-01-01 RX ADMIN — LEVOTHYROXINE SODIUM 75 MCG: 0.03 TABLET ORAL at 08:42

## 2023-01-01 RX ADMIN — PHENYLEPHRINE HYDROCHLORIDE 100 MCG: 10 INJECTION INTRAVENOUS at 14:58

## 2023-01-01 RX ADMIN — PROPOFOL 10 MG: 10 INJECTION, EMULSION INTRAVENOUS at 12:05

## 2023-01-01 RX ADMIN — Medication 2.5 MG: at 12:34

## 2023-01-01 RX ADMIN — SENNOSIDES AND DOCUSATE SODIUM 2 TABLET: 50; 8.6 TABLET ORAL at 08:11

## 2023-01-01 RX ADMIN — METHOCARBAMOL TABLETS 500 MG: 500 TABLET, COATED ORAL at 17:09

## 2023-01-01 RX ADMIN — MICONAZOLE NITRATE: 20 POWDER TOPICAL at 08:12

## 2023-01-01 RX ADMIN — Medication 1 MG: at 21:23

## 2023-01-01 RX ADMIN — Medication 81 MG: at 21:41

## 2023-01-01 RX ADMIN — PHENYLEPHRINE HYDROCHLORIDE 50 MCG: 10 INJECTION INTRAVENOUS at 14:31

## 2023-01-01 RX ADMIN — LEVOTHYROXINE SODIUM 75 MCG: 0.03 TABLET ORAL at 08:11

## 2023-01-01 RX ADMIN — DEXMEDETOMIDINE HYDROCHLORIDE 12 MCG: 100 INJECTION, SOLUTION INTRAVENOUS at 13:51

## 2023-01-01 RX ADMIN — ACETAMINOPHEN 1000 MG: 500 TABLET ORAL at 08:41

## 2023-01-01 RX ADMIN — DEXMEDETOMIDINE HYDROCHLORIDE 4 MCG: 100 INJECTION, SOLUTION INTRAVENOUS at 13:20

## 2023-01-01 RX ADMIN — PHENYLEPHRINE HYDROCHLORIDE 100 MCG: 10 INJECTION INTRAVENOUS at 14:54

## 2023-01-01 RX ADMIN — RISPERIDONE 0.25 MG: 0.25 TABLET, FILM COATED ORAL at 21:02

## 2023-01-01 RX ADMIN — SENNOSIDES AND DOCUSATE SODIUM 1 TABLET: 50; 8.6 TABLET ORAL at 21:23

## 2023-01-01 RX ADMIN — BUSPIRONE HYDROCHLORIDE 7.5 MG: 7.5 TABLET ORAL at 21:13

## 2023-01-01 RX ADMIN — PHENYLEPHRINE HYDROCHLORIDE 0.4 MCG/KG/MIN: 10 INJECTION INTRAVENOUS at 13:13

## 2023-01-01 RX ADMIN — DONEPEZIL HYDROCHLORIDE 2.5 MG: 5 TABLET, FILM COATED ORAL at 10:00

## 2023-01-01 RX ADMIN — BUSPIRONE HYDROCHLORIDE 7.5 MG: 7.5 TABLET ORAL at 22:54

## 2023-01-01 RX ADMIN — ACETAMINOPHEN 1000 MG: 500 TABLET ORAL at 12:33

## 2023-01-01 RX ADMIN — CEFAZOLIN 2 G: 1 INJECTION, POWDER, FOR SOLUTION INTRAMUSCULAR; INTRAVENOUS at 11:53

## 2023-01-01 RX ADMIN — ACETAMINOPHEN 975 MG: 325 TABLET ORAL at 14:26

## 2023-01-01 RX ADMIN — ASPIRIN 81 MG: 81 TABLET, COATED ORAL at 09:24

## 2023-01-01 RX ADMIN — QUETIAPINE FUMARATE 6.25 MG: 25 TABLET ORAL at 18:09

## 2023-01-01 RX ADMIN — RISPERIDONE 0.25 MG: 0.25 TABLET, FILM COATED ORAL at 21:12

## 2023-01-01 RX ADMIN — PROPOFOL 120 MG: 10 INJECTION, EMULSION INTRAVENOUS at 12:40

## 2023-01-01 RX ADMIN — FENTANYL CITRATE 25 MCG: 0.05 INJECTION, SOLUTION INTRAMUSCULAR; INTRAVENOUS at 12:02

## 2023-01-01 RX ADMIN — SERTRALINE HYDROCHLORIDE 50 MG: 50 TABLET, FILM COATED ORAL at 08:29

## 2023-01-01 RX ADMIN — RISPERIDONE 0.25 MG: 0.25 TABLET, FILM COATED ORAL at 21:23

## 2023-01-01 RX ADMIN — PHENYLEPHRINE HYDROCHLORIDE 200 MCG: 10 INJECTION INTRAVENOUS at 12:31

## 2023-01-01 RX ADMIN — Medication 7.5 MG: at 12:32

## 2023-01-01 RX ADMIN — RISPERIDONE 0.25 MG: 0.25 TABLET, FILM COATED ORAL at 21:34

## 2023-01-01 RX ADMIN — TRAMADOL HYDROCHLORIDE 25 MG: 50 TABLET, COATED ORAL at 09:45

## 2023-01-01 RX ADMIN — QUETIAPINE FUMARATE 6.25 MG: 25 TABLET ORAL at 14:00

## 2023-01-01 RX ADMIN — QUETIAPINE FUMARATE 6.25 MG: 25 TABLET ORAL at 10:04

## 2023-01-01 RX ADMIN — LEVOTHYROXINE SODIUM 50 MCG: 0.05 TABLET ORAL at 10:06

## 2023-01-01 RX ADMIN — ACETAMINOPHEN 975 MG: 325 TABLET ORAL at 16:26

## 2023-01-01 RX ADMIN — PHENYLEPHRINE HYDROCHLORIDE 200 MCG: 10 INJECTION INTRAVENOUS at 12:46

## 2023-01-01 RX ADMIN — SENNOSIDES AND DOCUSATE SODIUM 1 TABLET: 50; 8.6 TABLET ORAL at 22:54

## 2023-01-01 RX ADMIN — BUSPIRONE HYDROCHLORIDE 7.5 MG: 7.5 TABLET ORAL at 21:55

## 2023-01-01 RX ADMIN — KETAMINE HYDROCHLORIDE 20 MG: 10 INJECTION, SOLUTION INTRAMUSCULAR; INTRAVENOUS at 12:02

## 2023-01-01 RX ADMIN — SERTRALINE HYDROCHLORIDE 50 MG: 50 TABLET ORAL at 08:42

## 2023-01-01 RX ADMIN — LEVOTHYROXINE SODIUM 50 MCG: 0.05 TABLET ORAL at 09:29

## 2023-01-01 RX ADMIN — SERTRALINE HYDROCHLORIDE 50 MG: 50 TABLET ORAL at 08:04

## 2023-01-01 RX ADMIN — Medication 1 MG: at 00:43

## 2023-01-01 RX ADMIN — ONDANSETRON 4 MG: 2 INJECTION INTRAMUSCULAR; INTRAVENOUS at 12:07

## 2023-01-01 RX ADMIN — SENNOSIDES AND DOCUSATE SODIUM 1 TABLET: 50; 8.6 TABLET ORAL at 21:41

## 2023-01-01 RX ADMIN — Medication 81 MG: at 20:00

## 2023-01-01 RX ADMIN — QUETIAPINE FUMARATE 12.5 MG: 25 TABLET ORAL at 06:19

## 2023-01-01 RX ADMIN — PHENYLEPHRINE HYDROCHLORIDE 50 MCG: 10 INJECTION INTRAVENOUS at 12:17

## 2023-01-01 RX ADMIN — OLANZAPINE 2.5 MG: 10 INJECTION, POWDER, FOR SOLUTION INTRAMUSCULAR at 18:50

## 2023-01-01 RX ADMIN — RISPERIDONE 0.25 MG: 0.25 TABLET, FILM COATED ORAL at 21:41

## 2023-01-01 RX ADMIN — FUROSEMIDE 40 MG: 20 TABLET ORAL at 08:11

## 2023-01-01 RX ADMIN — DONEPEZIL HYDROCHLORIDE 10 MG: 5 TABLET, FILM COATED ORAL at 17:09

## 2023-01-01 RX ADMIN — SODIUM CHLORIDE, PRESERVATIVE FREE: 5 INJECTION INTRAVENOUS at 00:14

## 2023-01-01 RX ADMIN — Medication 81 MG: at 20:18

## 2023-01-01 RX ADMIN — ACETAMINOPHEN 1000 MG: 500 TABLET ORAL at 07:46

## 2023-01-01 RX ADMIN — ALBUMIN (HUMAN): 12.5 SOLUTION INTRAVENOUS at 12:43

## 2023-01-01 RX ADMIN — ACETAMINOPHEN 975 MG: 325 TABLET ORAL at 00:31

## 2023-01-01 RX ADMIN — DONEPEZIL HYDROCHLORIDE 10 MG: 5 TABLET, FILM COATED ORAL at 21:13

## 2023-01-01 RX ADMIN — ACETAMINOPHEN 1000 MG: 500 TABLET ORAL at 21:24

## 2023-01-01 RX ADMIN — ACETAMINOPHEN 975 MG: 325 TABLET ORAL at 10:06

## 2023-01-01 RX ADMIN — KETAMINE HYDROCHLORIDE 10 MG: 10 INJECTION, SOLUTION INTRAMUSCULAR; INTRAVENOUS at 11:55

## 2023-01-01 RX ADMIN — CEFAZOLIN 1 G: 1 INJECTION, POWDER, FOR SOLUTION INTRAMUSCULAR; INTRAVENOUS at 08:48

## 2023-01-01 RX ADMIN — DONEPEZIL HYDROCHLORIDE 2.5 MG: 5 TABLET, FILM COATED ORAL at 09:24

## 2023-01-01 RX ADMIN — SENNOSIDES AND DOCUSATE SODIUM 1 TABLET: 50; 8.6 TABLET ORAL at 08:28

## 2023-01-01 RX ADMIN — LEVOTHYROXINE SODIUM 50 MCG: 0.05 TABLET ORAL at 08:22

## 2023-01-01 RX ADMIN — ACETAMINOPHEN 975 MG: 325 TABLET ORAL at 23:43

## 2023-01-01 RX ADMIN — ACETAMINOPHEN 975 MG: 325 TABLET ORAL at 20:15

## 2023-01-01 RX ADMIN — METHOCARBAMOL TABLETS 500 MG: 500 TABLET, COATED ORAL at 10:05

## 2023-01-01 RX ADMIN — Medication 20 MCG/MIN: at 12:05

## 2023-01-01 RX ADMIN — MICONAZOLE NITRATE: 20 POWDER TOPICAL at 00:50

## 2023-01-01 RX ADMIN — TRAMADOL HYDROCHLORIDE 25 MG: 50 TABLET, COATED ORAL at 09:06

## 2023-01-01 RX ADMIN — BUSPIRONE HYDROCHLORIDE 7.5 MG: 7.5 TABLET ORAL at 21:34

## 2023-01-01 RX ADMIN — PHENYLEPHRINE HYDROCHLORIDE 100 MCG: 10 INJECTION INTRAVENOUS at 12:56

## 2023-01-01 RX ADMIN — FENTANYL CITRATE 25 MCG: 0.05 INJECTION, SOLUTION INTRAMUSCULAR; INTRAVENOUS at 11:56

## 2023-01-01 RX ADMIN — ALBUMIN (HUMAN): 12.5 SOLUTION INTRAVENOUS at 13:03

## 2023-01-01 RX ADMIN — SENNOSIDES AND DOCUSATE SODIUM 2 TABLET: 50; 8.6 TABLET ORAL at 08:45

## 2023-01-01 RX ADMIN — DONEPEZIL HYDROCHLORIDE 10 MG: 5 TABLET, FILM COATED ORAL at 21:34

## 2023-01-01 RX ADMIN — MICONAZOLE NITRATE: 20 POWDER TOPICAL at 07:47

## 2023-01-01 RX ADMIN — LIDOCAINE HYDROCHLORIDE 40 MG: 20 INJECTION, SOLUTION INFILTRATION; PERINEURAL at 12:40

## 2023-01-01 RX ADMIN — SENNOSIDES AND DOCUSATE SODIUM 1 TABLET: 50; 8.6 TABLET ORAL at 21:24

## 2023-01-01 RX ADMIN — SENNOSIDES AND DOCUSATE SODIUM 1 TABLET: 50; 8.6 TABLET ORAL at 20:43

## 2023-01-01 RX ADMIN — HALOPERIDOL LACTATE 2 MG: 5 INJECTION, SOLUTION INTRAMUSCULAR at 22:03

## 2023-01-01 RX ADMIN — DONEPEZIL HYDROCHLORIDE 10 MG: 5 TABLET, FILM COATED ORAL at 21:03

## 2023-01-01 RX ADMIN — ACETAMINOPHEN 975 MG: 325 TABLET ORAL at 04:20

## 2023-01-01 RX ADMIN — SERTRALINE HYDROCHLORIDE 50 MG: 50 TABLET ORAL at 07:46

## 2023-01-01 RX ADMIN — RAMELTEON 8 MG: 8 TABLET, FILM COATED ORAL at 22:54

## 2023-01-01 RX ADMIN — METHOCARBAMOL TABLETS 500 MG: 500 TABLET, COATED ORAL at 21:22

## 2023-01-01 RX ADMIN — HYDROMORPHONE HYDROCHLORIDE 0.3 MG: 1 INJECTION, SOLUTION INTRAMUSCULAR; INTRAVENOUS; SUBCUTANEOUS at 18:07

## 2023-01-01 RX ADMIN — MICONAZOLE NITRATE: 20 POWDER TOPICAL at 08:11

## 2023-01-01 RX ADMIN — ACETAMINOPHEN 975 MG: 325 TABLET ORAL at 18:09

## 2023-01-01 RX ADMIN — PHENYLEPHRINE HYDROCHLORIDE 100 MCG: 10 INJECTION INTRAVENOUS at 12:23

## 2023-01-01 RX ADMIN — BUSPIRONE HYDROCHLORIDE 7.5 MG: 7.5 TABLET ORAL at 21:02

## 2023-01-01 RX ADMIN — SODIUM CHLORIDE, POTASSIUM CHLORIDE, SODIUM LACTATE AND CALCIUM CHLORIDE: 600; 310; 30; 20 INJECTION, SOLUTION INTRAVENOUS at 18:20

## 2023-01-01 RX ADMIN — MICONAZOLE NITRATE: 20 POWDER TOPICAL at 10:14

## 2023-01-01 RX ADMIN — SENNOSIDES AND DOCUSATE SODIUM 2 TABLET: 50; 8.6 TABLET ORAL at 21:11

## 2023-01-01 RX ADMIN — PHENYLEPHRINE HYDROCHLORIDE 200 MCG: 10 INJECTION INTRAVENOUS at 12:29

## 2023-01-01 RX ADMIN — ENOXAPARIN SODIUM 40 MG: 40 INJECTION SUBCUTANEOUS at 08:42

## 2023-01-01 RX ADMIN — ACETAMINOPHEN 1000 MG: 500 TABLET ORAL at 20:18

## 2023-01-01 RX ADMIN — RISPERIDONE 0.25 MG: 0.25 TABLET, FILM COATED ORAL at 21:13

## 2023-01-01 RX ADMIN — MICONAZOLE NITRATE: 20 POWDER TOPICAL at 00:21

## 2023-01-01 RX ADMIN — MICONAZOLE NITRATE: 20 POWDER TOPICAL at 08:42

## 2023-01-01 RX ADMIN — SERTRALINE HYDROCHLORIDE 50 MG: 50 TABLET, FILM COATED ORAL at 08:22

## 2023-01-01 RX ADMIN — FUROSEMIDE 40 MG: 20 TABLET ORAL at 10:01

## 2023-01-01 RX ADMIN — TRAMADOL HYDROCHLORIDE 50 MG: 50 TABLET, COATED ORAL at 08:22

## 2023-01-01 RX ADMIN — PHENYLEPHRINE HYDROCHLORIDE 50 MCG: 10 INJECTION INTRAVENOUS at 12:07

## 2023-01-01 RX ADMIN — SUGAMMADEX 200 MG: 100 INJECTION, SOLUTION INTRAVENOUS at 15:29

## 2023-01-01 RX ADMIN — CEFAZOLIN 1 G: 1 INJECTION, POWDER, FOR SOLUTION INTRAMUSCULAR; INTRAVENOUS at 04:52

## 2023-01-01 RX ADMIN — ROCURONIUM BROMIDE 50 MG: 50 INJECTION, SOLUTION INTRAVENOUS at 12:40

## 2023-01-01 RX ADMIN — SENNOSIDES AND DOCUSATE SODIUM 2 TABLET: 50; 8.6 TABLET ORAL at 08:04

## 2023-01-01 RX ADMIN — ACETAMINOPHEN 1000 MG: 500 TABLET ORAL at 14:19

## 2023-01-01 RX ADMIN — KETAMINE HYDROCHLORIDE 10 MG: 10 INJECTION, SOLUTION INTRAMUSCULAR; INTRAVENOUS at 11:58

## 2023-01-01 RX ADMIN — BUSPIRONE HYDROCHLORIDE 7.5 MG: 7.5 TABLET ORAL at 20:43

## 2023-01-01 RX ADMIN — DONEPEZIL HYDROCHLORIDE 10 MG: 5 TABLET, FILM COATED ORAL at 21:41

## 2023-01-01 RX ADMIN — BUPIVACAINE HYDROCHLORIDE IN DEXTROSE 2 ML: 7.5 INJECTION, SOLUTION SUBARACHNOID at 12:04

## 2023-01-01 RX ADMIN — TRAMADOL HYDROCHLORIDE 50 MG: 50 TABLET, COATED ORAL at 18:13

## 2023-01-01 RX ADMIN — ASPIRIN 81 MG: 81 TABLET, COATED ORAL at 08:22

## 2023-01-01 RX ADMIN — HYDROMORPHONE HYDROCHLORIDE 0.5 MG: 1 INJECTION, SOLUTION INTRAMUSCULAR; INTRAVENOUS; SUBCUTANEOUS at 13:26

## 2023-01-01 RX ADMIN — BUSPIRONE HYDROCHLORIDE 7.5 MG: 7.5 TABLET ORAL at 20:14

## 2023-01-01 RX ADMIN — FENTANYL CITRATE 50 MCG: 50 INJECTION INTRAMUSCULAR; INTRAVENOUS at 13:20

## 2023-01-01 RX ADMIN — LEVOTHYROXINE SODIUM 75 MCG: 0.03 TABLET ORAL at 07:46

## 2023-01-01 RX ADMIN — SERTRALINE HYDROCHLORIDE 50 MG: 50 TABLET, FILM COATED ORAL at 10:32

## 2023-01-01 RX ADMIN — Medication 81 MG: at 21:11

## 2023-01-01 RX ADMIN — PHENYLEPHRINE HYDROCHLORIDE 50 MCG: 10 INJECTION INTRAVENOUS at 14:04

## 2023-01-01 RX ADMIN — ASPIRIN 81 MG: 81 TABLET, COATED ORAL at 10:06

## 2023-01-01 RX ADMIN — GLYCOPYRROLATE 0.1 MG: 0.2 INJECTION INTRAMUSCULAR; INTRAVENOUS at 12:15

## 2023-01-01 RX ADMIN — DONEPEZIL HYDROCHLORIDE 10 MG: 5 TABLET, FILM COATED ORAL at 21:26

## 2023-01-01 RX ADMIN — DEXMEDETOMIDINE HYDROCHLORIDE 4 MCG: 100 INJECTION, SOLUTION INTRAVENOUS at 13:31

## 2023-01-01 RX ADMIN — SODIUM CHLORIDE, POTASSIUM CHLORIDE, SODIUM LACTATE AND CALCIUM CHLORIDE: 600; 310; 30; 20 INJECTION, SOLUTION INTRAVENOUS at 15:18

## 2023-01-01 RX ADMIN — DONEPEZIL HYDROCHLORIDE 10 MG: 5 TABLET, FILM COATED ORAL at 20:13

## 2023-01-01 RX ADMIN — Medication 81 MG: at 09:06

## 2023-01-01 RX ADMIN — ACETAMINOPHEN 650 MG: 325 TABLET ORAL at 08:04

## 2023-01-01 RX ADMIN — POLYETHYLENE GLYCOL 3350 17 G: 17 POWDER, FOR SOLUTION ORAL at 11:01

## 2023-01-01 RX ADMIN — GLYCOPYRROLATE 0.1 MG: 0.2 INJECTION INTRAMUSCULAR; INTRAVENOUS at 12:05

## 2023-01-01 RX ADMIN — FUROSEMIDE 40 MG: 20 TABLET ORAL at 10:13

## 2023-01-01 RX ADMIN — Medication 81 MG: at 21:14

## 2023-01-01 ASSESSMENT — ACTIVITIES OF DAILY LIVING (ADL)
ADLS_ACUITY_SCORE: 56
ADLS_ACUITY_SCORE: 53
ADLS_ACUITY_SCORE: 58
ADLS_ACUITY_SCORE: 55
ADLS_ACUITY_SCORE: 53
ADLS_ACUITY_SCORE: 57
DRESS: 0-->ASSISTANCE NEEDED (DEVELOPMENTALLY APPROPRIATE)
DOING_ERRANDS_INDEPENDENTLY_DIFFICULTY: YES
ADLS_ACUITY_SCORE: 49
ADLS_ACUITY_SCORE: 56
ADLS_ACUITY_SCORE: 58
ADLS_ACUITY_SCORE: 53
ADLS_ACUITY_SCORE: 52
ADLS_ACUITY_SCORE: 56
ADLS_ACUITY_SCORE: 49
TRANSFERRING: 2-->COMPLETELY DEPENDENT
ADLS_ACUITY_SCORE: 52
ADLS_ACUITY_SCORE: 58
ADLS_ACUITY_SCORE: 35
ADLS_ACUITY_SCORE: 52
ADLS_ACUITY_SCORE: 55
ADLS_ACUITY_SCORE: 47
ADLS_ACUITY_SCORE: 35
ADLS_ACUITY_SCORE: 56
ADLS_ACUITY_SCORE: 58
FALL_HISTORY_WITHIN_LAST_SIX_MONTHS: YES
ADLS_ACUITY_SCORE: 51
ADLS_ACUITY_SCORE: 52
ADLS_ACUITY_SCORE: 52
ADLS_ACUITY_SCORE: 55
ADLS_ACUITY_SCORE: 53
ADLS_ACUITY_SCORE: 60
ADLS_ACUITY_SCORE: 47
ADLS_ACUITY_SCORE: 58
ADLS_ACUITY_SCORE: 54
TOILETING_ISSUES: YES
TOILETING: 1-->ASSISTANCE (EQUIPMENT/PERSON) NEEDED (NOT DEVELOPMENTALLY APPROPRIATE)
DEPENDENT_IADLS:: CLEANING;COOKING;LAUNDRY;SHOPPING;MEAL PREPARATION;MEDICATION MANAGEMENT;MONEY MANAGEMENT;TRANSPORTATION;INCONTINENCE
ADLS_ACUITY_SCORE: 35
ADLS_ACUITY_SCORE: 56
ADLS_ACUITY_SCORE: 66
ADLS_ACUITY_SCORE: 51
ADLS_ACUITY_SCORE: 53
ADLS_ACUITY_SCORE: 58
ADLS_ACUITY_SCORE: 47
ADLS_ACUITY_SCORE: 57
BATHING: 1-->ASSISTANCE NEEDED
ADLS_ACUITY_SCORE: 60
ADLS_ACUITY_SCORE: 56
ADLS_ACUITY_SCORE: 55
ADLS_ACUITY_SCORE: 56
ADLS_ACUITY_SCORE: 53
ADLS_ACUITY_SCORE: 56
ADLS_ACUITY_SCORE: 57
WALKING_OR_CLIMBING_STAIRS: STAIR CLIMBING DIFFICULTY, DEPENDENT
ADLS_ACUITY_SCORE: 47
ADLS_ACUITY_SCORE: 52
DRESSING/BATHING: DRESSING DIFFICULTY, ASSISTANCE 1 PERSON
ADLS_ACUITY_SCORE: 66
ADLS_ACUITY_SCORE: 64
ADLS_ACUITY_SCORE: 53
ADLS_ACUITY_SCORE: 56
ADLS_ACUITY_SCORE: 53
DEPENDENT_IADLS:: CLEANING;COOKING;LAUNDRY;SHOPPING;MEAL PREPARATION;MEDICATION MANAGEMENT;MONEY MANAGEMENT;TRANSPORTATION;INCONTINENCE
ADLS_ACUITY_SCORE: 51
ADLS_ACUITY_SCORE: 53
ADLS_ACUITY_SCORE: 49
ADLS_ACUITY_SCORE: 67
ADLS_ACUITY_SCORE: 35
TOILETING_MANAGEMENT: ASSIST OF ONE
ADLS_ACUITY_SCORE: 66
TOILETING: 2-->COMPLETELY DEPENDENT
ADLS_ACUITY_SCORE: 66
ADLS_ACUITY_SCORE: 67
ADLS_ACUITY_SCORE: 51
ADLS_ACUITY_SCORE: 60
ADLS_ACUITY_SCORE: 53
ADLS_ACUITY_SCORE: 51
ADLS_ACUITY_SCORE: 59
ADLS_ACUITY_SCORE: 60
ADLS_ACUITY_SCORE: 53
DRESSING/BATHING_DIFFICULTY: YES
ADLS_ACUITY_SCORE: 47
TRANSFERRING: 2-->COMPLETELY DEPENDENT (NOT DEVELOPMENTALLY APPROPRIATE)
ADLS_ACUITY_SCORE: 47
ADLS_ACUITY_SCORE: 35
ADLS_ACUITY_SCORE: 52
ADLS_ACUITY_SCORE: 66
ADLS_ACUITY_SCORE: 52
ADLS_ACUITY_SCORE: 57
DIFFICULTY_EATING/SWALLOWING: NO
ADLS_ACUITY_SCORE: 53
ADLS_ACUITY_SCORE: 57
ADLS_ACUITY_SCORE: 66
ADLS_ACUITY_SCORE: 54
ADLS_ACUITY_SCORE: 57
ADLS_ACUITY_SCORE: 54
ADLS_ACUITY_SCORE: 35
ADLS_ACUITY_SCORE: 60
ADLS_ACUITY_SCORE: 56
ADLS_ACUITY_SCORE: 67
ADLS_ACUITY_SCORE: 57
ADLS_ACUITY_SCORE: 57
ADLS_ACUITY_SCORE: 60
ADLS_ACUITY_SCORE: 60
ADLS_ACUITY_SCORE: 59
ADLS_ACUITY_SCORE: 60
ADLS_ACUITY_SCORE: 66
ADLS_ACUITY_SCORE: 60
ADLS_ACUITY_SCORE: 58
ADLS_ACUITY_SCORE: 51
DRESSING/BATHING_MANAGEMENT: LIVES IN MEMORY CARE
ADLS_ACUITY_SCORE: 53
ADLS_ACUITY_SCORE: 66
ADLS_ACUITY_SCORE: 56
ADLS_ACUITY_SCORE: 54
ADLS_ACUITY_SCORE: 52
ADLS_ACUITY_SCORE: 67
ADLS_ACUITY_SCORE: 56
ADLS_ACUITY_SCORE: 54
CHANGE_IN_FUNCTIONAL_STATUS_SINCE_ONSET_OF_CURRENT_ILLNESS/INJURY: YES
EQUIPMENT_CURRENTLY_USED_AT_HOME: WALKER, STANDARD;WHEELCHAIR, MANUAL
ADLS_ACUITY_SCORE: 58
ADLS_ACUITY_SCORE: 53
ADLS_ACUITY_SCORE: 59
ADLS_ACUITY_SCORE: 64
ADLS_ACUITY_SCORE: 57
ADLS_ACUITY_SCORE: 51
ADLS_ACUITY_SCORE: 49
ADLS_ACUITY_SCORE: 49
ADLS_ACUITY_SCORE: 56
ADLS_ACUITY_SCORE: 53
ADLS_ACUITY_SCORE: 47
ADLS_ACUITY_SCORE: 51
ADLS_ACUITY_SCORE: 51
ADLS_ACUITY_SCORE: 57
ADLS_ACUITY_SCORE: 57
ADLS_ACUITY_SCORE: 55
ADLS_ACUITY_SCORE: 53
ADLS_ACUITY_SCORE: 51
ADLS_ACUITY_SCORE: 55
ADLS_ACUITY_SCORE: 56
ADLS_ACUITY_SCORE: 52
WALKING_OR_CLIMBING_STAIRS_DIFFICULTY: YES
ADLS_ACUITY_SCORE: 51
ADLS_ACUITY_SCORE: 53
ADLS_ACUITY_SCORE: 67
ADLS_ACUITY_SCORE: 55
ADLS_ACUITY_SCORE: 52
ADLS_ACUITY_SCORE: 52
ADLS_ACUITY_SCORE: 49
ADLS_ACUITY_SCORE: 52
ADLS_ACUITY_SCORE: 51
ADLS_ACUITY_SCORE: 52
ADLS_ACUITY_SCORE: 56
ADLS_ACUITY_SCORE: 56
ADLS_ACUITY_SCORE: 57
ADLS_ACUITY_SCORE: 52
ADLS_ACUITY_SCORE: 53
WEAR_GLASSES_OR_BLIND: NO
ADLS_ACUITY_SCORE: 64
ADLS_ACUITY_SCORE: 35
ADLS_ACUITY_SCORE: 54
TOILETING_ASSISTANCE: TOILETING DIFFICULTY, ASSISTANCE 1 PERSON
DRESS: 1-->ASSISTANCE (EQUIPMENT/PERSON) NEEDED
ADLS_ACUITY_SCORE: 47
ADLS_ACUITY_SCORE: 56

## 2023-01-01 ASSESSMENT — LIFESTYLE VARIABLES: TOBACCO_USE: 0

## 2023-01-01 ASSESSMENT — COPD QUESTIONNAIRES: COPD: 0

## 2023-01-10 ENCOUNTER — LAB REQUISITION (OUTPATIENT)
Dept: LAB | Facility: CLINIC | Age: 83
End: 2023-01-10
Payer: COMMERCIAL

## 2023-01-10 DIAGNOSIS — I89.0 LYMPHEDEMA, NOT ELSEWHERE CLASSIFIED: ICD-10-CM

## 2023-01-12 LAB
ANION GAP SERPL CALCULATED.3IONS-SCNC: 13 MMOL/L (ref 7–15)
BUN SERPL-MCNC: 27.6 MG/DL (ref 8–23)
CALCIUM SERPL-MCNC: 9.8 MG/DL (ref 8.8–10.2)
CHLORIDE SERPL-SCNC: 105 MMOL/L (ref 98–107)
CREAT SERPL-MCNC: 1.02 MG/DL (ref 0.51–0.95)
DEPRECATED HCO3 PLAS-SCNC: 24 MMOL/L (ref 22–29)
GFR SERPL CREATININE-BSD FRML MDRD: 54 ML/MIN/1.73M2
GLUCOSE SERPL-MCNC: 84 MG/DL (ref 70–99)
POTASSIUM SERPL-SCNC: 4.1 MMOL/L (ref 3.4–5.3)
SODIUM SERPL-SCNC: 142 MMOL/L (ref 136–145)

## 2023-01-12 PROCEDURE — P9604 ONE-WAY ALLOW PRORATED TRIP: HCPCS | Mod: ORL | Performed by: INTERNAL MEDICINE

## 2023-01-12 PROCEDURE — 36415 COLL VENOUS BLD VENIPUNCTURE: CPT | Mod: ORL | Performed by: INTERNAL MEDICINE

## 2023-01-12 PROCEDURE — 80048 BASIC METABOLIC PNL TOTAL CA: CPT | Mod: ORL | Performed by: INTERNAL MEDICINE

## 2023-04-15 PROBLEM — E03.9 HYPOTHYROIDISM: Status: ACTIVE | Noted: 2023-01-01

## 2023-04-15 PROBLEM — R60.0 BILATERAL LOWER EXTREMITY EDEMA: Status: ACTIVE | Noted: 2023-01-01

## 2023-04-15 PROBLEM — E78.5 HYPERLIPIDEMIA: Status: ACTIVE | Noted: 2023-01-01

## 2023-04-15 PROBLEM — S72.002A CLOSED LEFT HIP FRACTURE (H): Status: ACTIVE | Noted: 2023-01-01

## 2023-04-15 PROBLEM — F03.90 DEMENTIA (H): Status: ACTIVE | Noted: 2023-01-01

## 2023-04-15 NOTE — ED TRIAGE NOTES
Pt BIBA f/[facility] after pt sustained unwitnessed fall. Pt c/o Left hip pain at this time. Alert, oriented to self only (baseline per daughter)     Triage Assessment     Row Name 04/15/23 1326       Triage Assessment (Adult)    Airway WDL WDL       Respiratory WDL    Respiratory WDL WDL       Cardiac WDL    Cardiac WDL WDL       Peripheral/Neurovascular WDL    Peripheral Neurovascular WDL WDL       Cognitive/Neuro/Behavioral WDL    Cognitive/Neuro/Behavioral WDL X    Level of Consciousness alert;confused    Arousal Level opens eyes spontaneously    Orientation disoriented to;place;time;situation    Speech clear;spontaneous;logical    Mood/Behavior calm;cooperative

## 2023-04-15 NOTE — ED NOTES
[facility]  Expected date:   Expected time:   Means of arrival:   Comments:  [facility] - 82 y F Possible hip fx

## 2023-04-15 NOTE — ED PROVIDER NOTES
EMERGENCY DEPARTMENT ENCOUNTER      NAME: Shanta Tello  AGE: 82 year old female  YOB: 1940  MRN: [id number]  EVALUATION DATE & TIME: 4/15/2023  1:04 PM    PCP: Reginaldo Red    ED PROVIDER: Stuart Cintron M.D.    Chief Complaint   Patient presents with     Fall     Hip Pain       FINAL IMPRESSION:  1. Hip fracture, left, closed, initial encounter (H)    2. Dementia without behavioral disturbance, psychotic disturbance, mood disturbance, or anxiety, unspecified dementia severity, unspecified dementia type (H)        ED COURSE & MEDICAL DECISION MAKING:    Pertinent Labs & Imaging studies independently interpreted by me. (See chart for details)  1:12 PM Patient seen and examined, prior records reviewed, patient was seen with a syncopal episode in March 2022 with no definite etiology identified.  Patient presents from [facility] care today with unwitnessed fall.  Patient is unable to provide any history due to dementia, complains of left hip pain.  Left hip is obviously deformed and slightly shortened.  No external signs of trauma otherwise and patient has no complaints.  Labs ordered along with x-ray of the hip and CT scan of the head given unwitnessed fall although again no external signs of head injury.  2:30 PM x-ray of the left hip independently interpreted by me demonstrates intertrochanteric proximal femur fracture with comminuted fracture displacement of the lesser trochanter as well.  CT scan scan of the head independently interpreted by me negative.  Labs independently interpreted by me demonstrate normal creatinine and remaining reassuring basic panel, troponin 24.  White blood cell count slightly elevated, urinalysis is pending.  Patient will be admitted or transferred, will discuss with orthopedics.  2:43 PM Spoke with DANETTE Erazo from [facility].  Will determine whether surgery time is available at [facility] or [facility] for the patient, plan to transfer to [facility] if surgery can  be done there tomorrow.  3:24 PM Spoke with [facility] hospitalist who accepts the patient for admission     At the conclusion of the encounter I discussed the results of all of the tests and the disposition. The questions were answered. The patient or family acknowledged understanding and was agreeable with the care plan.     Medical Decision Making    History:    Supplemental history from: EMS    External Record(s) reviewed: Documented in chart, if applicable.    Work Up:    Chart documentation includes differential considered and any EKGs or imaging independently interpreted by provider, where specified.    In additional to work up documented, I considered the following work up: Documented in chart, if applicable.    External consultation:    Discussion of management with another provider: Orthopedics    Complicating factors:    Care impacted by chronic illness: N/A    Care affected by social determinants of health: N/A    Disposition considerations: Admit.    EKG:    Performed at: 1:34 PM  Impression: Specific T wave changes  Rate: 74  Rhythm: Sinus  Axis: Normal  [place] Interval: 196  QRS Interval: 74  QTc Interval: 446  ST Changes: No acute ischemic change  Comparison: December 2017, no change    I have independently reviewed and interpreted the EKG(s) documented above.    PROCEDURES:       MEDICATIONS GIVEN IN THE EMERGENCY:  Medications   HYDROmorphone (PF) (DILAUDID) injection 0.5 mg (0.5 mg Intravenous $Given 4/15/23 1326)       NEW PRESCRIPTIONS STARTED AT TODAY'S ER VISIT  New Prescriptions    No medications on file       =================================================================    HPI - limited due to dementia     Patient information was obtained from: patient and EMS      Shanta Tello is a 82 year old female with no pertinent history (reviewed) who presents to this ED via EMS for evaluation of hip pain and fall.    Patient comes from a memory care facility after an unwitnessed fall. Per EMS, the  patient has a left hip deformity. The patient endorses left hip pain. She denies any abdominal pain or headache    REVIEW OF SYSTEMS - limited due to dementia   Review of Systems     PAST MEDICAL HISTORY:  History reviewed. No pertinent past medical history.    PAST SURGICAL HISTORY:  History reviewed. No pertinent surgical history.    CURRENT MEDICATIONS:    No current facility-administered medications for this encounter.     Current Outpatient Medications   Medication     ondansetron (ZOFRAN ODT) 4 MG disintegrating tablet       ALLERGIES:  No Known Allergies    FAMILY HISTORY:  History reviewed. No pertinent family history.    SOCIAL HISTORY:   Social History     Socioeconomic History     Marital status:        VITALS:  /83   Pulse 82   Resp 20   SpO2 94%     PHYSICAL EXAM:  Physical Exam  Vitals and nursing note reviewed.   Constitutional:       Appearance: Normal appearance.   HENT:      Head: Normocephalic and atraumatic.      Right Ear: External ear normal.      Left Ear: External ear normal.      Nose: Nose normal.      Mouth/Throat:      Mouth: Mucous membranes are moist.   Eyes:      Extraocular Movements: Extraocular movements intact.      Conjunctiva/sclera: Conjunctivae normal.      Pupils: Pupils are equal, round, and reactive to light.   Cardiovascular:      Rate and Rhythm: Normal rate and regular rhythm.   Pulmonary:      Effort: Pulmonary effort is normal.      Breath sounds: Normal breath sounds. No wheezing or rales.   Abdominal:      General: Abdomen is flat. There is no distension.      Palpations: Abdomen is soft.      Tenderness: There is no abdominal tenderness. There is no guarding.   Musculoskeletal:         General: Normal range of motion.      Cervical back: Normal range of motion and neck supple.      Left lower leg: Deformity present.      Comments: Left leg shortened   Lymphadenopathy:      Cervical: No cervical adenopathy.   Skin:     General: Skin is warm and dry.    Neurological:      General: No focal deficit present.      Mental Status: She is alert and oriented to person, place, and time. Mental status is at baseline.      Comments: No gross focal neurologic deficits   Psychiatric:         Mood and Affect: Mood normal.         Behavior: Behavior normal.         Thought Content: Thought content normal.       LAB:  All pertinent labs reviewed and interpreted.  Results for orders placed or performed during the hospital encounter of 04/15/23   Head CT w/o contrast    Impression    IMPRESSION:  HEAD CT:  1.  No CT evidence for acute intracranial process.  2.  Brain atrophy and presumed chronic microvascular ischemic changes as above.  3.  Expected progression of parenchymal volume loss since 2011.    CERVICAL SPINE CT:  1.  No CT evidence for acute fracture or post traumatic subluxation.  2.  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   XR Pelvis and Hip Left 2 Views    Impression    IMPRESSION: Acute left proximal femur fractures, a moderately comminuted fracture involving the lesser trochanter and the subtrochanteric proximal femur. Moderate fracture displacement and angulation. The left greater trochanter and the femoral neck   appear intact. The femoral head is intact. Normal left hip joint alignment, with maintained joint spacing and without significant arthritic changes. No acute bone or joint abnormality in the hips or pelvis otherwise. Moderate degenerative changes in the   intervertebral disc spaces and facet joints of the lower lumbar spine, within normal limits for age.   Cervical spine CT w/o contrast    Impression    IMPRESSION:  HEAD CT:  1.  No CT evidence for acute intracranial process.  2.  Brain atrophy and presumed chronic microvascular ischemic changes as above.  3.  Expected progression of parenchymal volume loss since 2011.    CERVICAL SPINE CT:  1.  No CT evidence for acute fracture or post  traumatic subluxation.  2.  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   XR Femur Left 2 Views    Impression    IMPRESSION:   1.  Comminuted left proximal femur fracture involving the lesser trochanter and subtrochanteric regions. No change in alignment or orientation.  2.  No additional fractures.  3.  Normal joint alignment.    Extra Blue Top Tube   Result Value Ref Range    Hold Specimen JIC    Extra Red Top Tube   Result Value Ref Range    Hold Specimen JIC    Extra Green Top (Lithium Heparin) Tube   Result Value Ref Range    Hold Specimen JIC    Extra Purple Top Tube   Result Value Ref Range    Hold Specimen JIC    Result Value Ref Range    Troponin T, High Sensitivity 24 (H) <=14 ng/L   UA with Microscopic reflex to Culture    Specimen: Urine, Midstream   Result Value Ref Range    Color Urine Light Yellow Colorless, Straw, Light Yellow, Yellow    Appearance Urine Clear Clear    Glucose Urine Negative Negative mg/dL    Bilirubin Urine Negative Negative    Ketones Urine Negative Negative mg/dL    Specific Gravity Urine 1.012 1.001 - 1.030    Blood Urine Negative Negative    pH Urine 5.5 5.0 - 7.0    Protein Albumin Urine Negative Negative mg/dL    Urobilinogen Urine <2.0 <2.0 mg/dL    Nitrite Urine Negative Negative    Leukocyte Esterase Urine 500 Jarrett/uL (A) Negative    RBC Urine 1 <=2 /HPF    WBC Urine 16 (H) <=5 /HPF    Squamous Epithelials Urine 1 <=1 /HPF   Basic metabolic panel   Result Value Ref Range    Sodium 138 136 - 145 mmol/L    Potassium 4.0 3.4 - 5.3 mmol/L    Chloride 102 98 - 107 mmol/L    Carbon Dioxide (CO2) 25 22 - 29 mmol/L    Anion Gap 11 7 - 15 mmol/L    Urea Nitrogen 26.2 (H) 8.0 - 23.0 mg/dL    Creatinine 1.12 (H) 0.51 - 0.95 mg/dL    Calcium 9.2 8.8 - 10.2 mg/dL    Glucose 108 (H) 70 - 99 mg/dL    GFR Estimate 48 (L) >60 mL/min/1.73m2   Result Value Ref Range    INR 0.98 0.85 - 1.15   Result Value Ref Range    Magnesium 1.9  1.7 - 2.3 mg/dL   CBC with platelets and differential   Result Value Ref Range    WBC Count 13.2 (H) 4.0 - 11.0 10e3/uL    RBC Count 3.91 3.80 - 5.20 10e6/uL    Hemoglobin 12.7 11.7 - 15.7 g/dL    Hematocrit 38.7 35.0 - 47.0 %    MCV 99 78 - 100 fL    MCH 32.5 26.5 - 33.0 pg    MCHC 32.8 31.5 - 36.5 g/dL    RDW 12.9 10.0 - 15.0 %    Platelet Count 218 150 - 450 10e3/uL    % Neutrophils 86 %    % Lymphocytes 7 %    % Monocytes 7 %    % Eosinophils 0 %    % Basophils 0 %    % Immature Granulocytes 0 %    NRBCs per 100 WBC 0 <1 /100    Absolute Neutrophils 11.3 (H) 1.6 - 8.3 10e3/uL    Absolute Lymphocytes 0.9 0.8 - 5.3 10e3/uL    Absolute Monocytes 0.9 0.0 - 1.3 10e3/uL    Absolute Eosinophils 0.1 0.0 - 0.7 10e3/uL    Absolute Basophils 0.0 0.0 - 0.2 10e3/uL    Absolute Immature Granulocytes 0.0 <=0.4 10e3/uL    Absolute NRBCs 0.0 10e3/uL   Adult Type and Screen   Result Value Ref Range    ABO/RH(D) O POS     Antibody Screen Negative Negative    SPECIMEN EXPIRATION DATE [id number]        RADIOLOGY:  Reviewed all pertinent imaging. Please see official radiology report.  XR Femur Left 2 Views   Final Result   IMPRESSION:    1.  Comminuted left proximal femur fracture involving the lesser trochanter and subtrochanteric regions. No change in alignment or orientation.   2.  No additional fractures.   3.  Normal joint alignment.       Cervical spine CT w/o contrast   Final Result   IMPRESSION:   HEAD CT:   1.  No CT evidence for acute intracranial process.   2.  Brain atrophy and presumed chronic microvascular ischemic changes as above.   3.  Expected progression of parenchymal volume loss since 2011.      CERVICAL SPINE CT:   1.  No CT evidence for acute fracture or post traumatic subluxation.   2.  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      XR Pelvis and Hip Left 2 Views   Final Result   IMPRESSION: Acute left proximal femur fractures, a moderately  comminuted fracture involving the lesser trochanter and the subtrochanteric proximal femur. Moderate fracture displacement and angulation. The left greater trochanter and the femoral neck    appear intact. The femoral head is intact. Normal left hip joint alignment, with maintained joint spacing and without significant arthritic changes. No acute bone or joint abnormality in the hips or pelvis otherwise. Moderate degenerative changes in the    intervertebral disc spaces and facet joints of the lower lumbar spine, within normal limits for age.      Head CT w/o contrast   Final Result   IMPRESSION:   HEAD CT:   1.  No CT evidence for acute intracranial process.   2.  Brain atrophy and presumed chronic microvascular ischemic changes as above.   3.  Expected progression of parenchymal volume loss since 2011.      CERVICAL SPINE CT:   1.  No CT evidence for acute fracture or post traumatic subluxation.   2.  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          I, Aster Bhattialba, am serving as a scribe to document services personally performed by Dr. Cintron based on my observation and the provider's statements to me. I, Stuart Cintron MD attest that Aster Mello is acting in a scribe capacity, has observed my performance of the services and has documented them in accordance with my direction.    Stuart Cintron M.D.  Emergency Medicine  [facility] EMERGENCY DEPARTMENT  [facility] [facility] [address]  [phone]  Dept: [phone]     Stuart Cintron MD  04/15/23 3577

## 2023-04-16 PROBLEM — Z66 DNR (DO NOT RESUSCITATE): Chronic | Status: ACTIVE | Noted: 2023-01-01

## 2023-04-16 PROBLEM — Z87.81 STATUS POST-OPERATIVE REPAIR OF HIP FRACTURE: Status: ACTIVE | Noted: 2023-01-01

## 2023-04-16 PROBLEM — S72.002A CLOSED LEFT HIP FRACTURE (H): Chronic | Status: ACTIVE | Noted: 2023-01-01

## 2023-04-16 PROBLEM — Z66 DNR (DO NOT RESUSCITATE): Status: ACTIVE | Noted: 2023-01-01

## 2023-04-16 PROBLEM — Z98.890 STATUS POST-OPERATIVE REPAIR OF HIP FRACTURE: Status: ACTIVE | Noted: 2023-01-01

## 2023-04-16 PROBLEM — F03.90 DEMENTIA (H): Chronic | Status: ACTIVE | Noted: 2023-01-01

## 2023-04-16 NOTE — PROGRESS NOTES
Pt. A&O to self. Daughter, Angela has been at bedside. NPO status maintained for pt. As per the order for a procedure. CHG bath done.    Pt. Transferred to OR 1 and repoert given to NOEL Marcum.    Julia Jenkins RN on 4/16/2023 at 11:10 AM

## 2023-04-16 NOTE — ANESTHESIA PROCEDURE NOTES
PENG Procedure Note    Pre-Procedure   Staff -        Anesthesiologist:  Roge Hyatt MD       Performed By: anesthesiologist       Location: pre-op       Procedure Start/Stop Times: 4/16/2023 11:56 AM and 4/16/2023 11:58 AM       Pre-Anesthestic Checklist: patient identified, IV checked, site marked, risks and benefits discussed, informed consent, monitors and equipment checked, pre-op evaluation, at physician/surgeon's request and post-op pain management  Timeout:       Correct Patient: Yes        Correct Procedure: Yes        Correct Site: Yes        Correct Position: Yes        Correct Laterality: Yes        Site Marked: Yes  Procedure Documentation  Procedure: PENG       Laterality: left       Patient Position: supine       Patient Prep/Sterile Barriers: sterile gloves, mask       Skin prep: Chloraprep       Needle Type: short bevel       Needle Gauge: 20.        Needle Length (Inches): 4        Ultrasound guided       1. Ultrasound was used to identify targeted nerve, plexus, vascular marker, or fascial plane and place a needle adjacent to it in real-time.       2. Ultrasound was used to visualize the spread of anesthetic in close proximity to the above referenced structure.       3. A permanent image is entered into the patient's record.       4. The visualized anatomic structures appeared normal.       5. There were no apparent abnormal pathologic findings.    Assessment/Narrative         The placement was negative for: blood aspirated, painful injection and site bleeding       Paresthesias: No.       Bolus given via needle..        Secured via.        Insertion/Infusion Method: Single Shot       Complications: none       Injection made incrementally with aspirations every 5 mL.    Medication(s) Administered   Bupivacaine 0.25% PF (Infiltration) - Infiltration   30 mL - 4/16/2023 11:58:00 AM  Medication Administration Time: 4/16/2023 11:56 AM      FOR [facility] ([facility] ONLY:   Pain Team  "Contact information: please page the Pain Team Via [facility]. Search \"Pain\". During daytime hours, please page the attending first. At night please page the resident first.      "

## 2023-04-16 NOTE — H&P
[facility] MEDICINE ADMISSION HISTORY AND PHYSICAL   Date of Admission: 4/15/2023  Shanta Tello, 1940, [id number]    Identification/Summary:   Shanta Tello is a 82 year old female memory care resident with [facility] signficant for dementia, lower extremity edema, hypothyroidism, hyperlipidemia.  Presented after a unwitnessed fall at her facility.  Seen at the [facility] ED and diagnosed with a left comminuted proximal femur fracture involving the lesser trochanter.  CT head negative except for atrophy and severe cervical foraminal stenosis.  Transferred to [facility] with plans for [facility] to take for hip repair on 4/16.  Patient nonverbal at this time.  Daughter is present.    Assessment and Plan:    Unwitnessed fall  Left comminuted proximal femur fracture  Inpatient [facility] admission.  Expect length of stay greater than 2 nights.  Scheduled Tylenol and as needed low-dose Dilaudid p.o./IV ordered for the patient.  Daughter is present and supportive.  Biggest challenge will be patient's ability to participate in therapy following her surgery.  [facility] consulted and planning to go to the OR tomorrow.  EKG stable.  No cardiopulmonary history per daughter.  Should be moderate surgical risk.  Dementia  Per the daughter does have a history of some sundowning.  Order her home bedtime BuSpar and Aricept.  Order Zoloft for tomorrow morning.  Will utilize encephalopathy order set and have Seroquel available as needed for agitation.  Close supervision by nursing staff.  Chronic lower extremity edema  Patient is chronically on Lasix.  We will hold at this time.  Verified with daughter that she does not have a history of heart failure.  Hypothyroidism  Order home levothyroxine.  Check TSH level.    Anticoagulation   Pneumatic Compression Devices    COVID19 PCR not tested per current policy  Noted.  FoleyNot present    Code Status: Full Code    Disposition: Inpatient     Clinically  Significant Risk Factors Present on Admission                    # Dementia: noted on problem list            Chief Complaint  left hip pain after fall     HISTORY     Shanta Tello is a 82 year old female memory care resident with [facility] signficant for dementia, lower extremity edema, hypothyroidism, hyperlipidemia.  Presented after a unwitnessed fall at her facility.  Seen at the [facility] ED and diagnosed with a left comminuted proximal femur fracture involving the lesser trochanter.  CT head negative except for atrophy and severe cervical foraminal stenosis.  Transferred to [facility] with plans for [facility] to take for hip repair on 4/16.  Patient nonverbal at this time.  Daughter is present..  Normally at baseline patient is conversant.  Able to feed herself with some mild assistance.  Does get confused and is starting to not recognize family members.  Typically is up and ambulating quite a bit.  Patient appears uncomfortable but in no obvious acute distress.  Historical information provided by daughter and limited paper records from facility.  Questions answered to verbalize satisfaction.    Past Medical History     Past Medical History:  No date: Bilateral lower extremity edema  No date: Dementia (H)  No date: Hyperlipidemia  No date: Hypothyroidism     Patient Active Problem List    Diagnosis Date Noted     Dementia (H) 04/15/2023     Priority: Medium     Closed left hip fracture (H) 04/15/2023     Priority: Medium     Hypothyroidism 04/15/2023     Priority: Medium     Hyperlipidemia 04/15/2023     Priority: Medium     Bilateral lower extremity edema 04/15/2023     Priority: Medium     Surgical History     Past Surgical History:   Procedure Laterality Date     Meniscal tear repair       Family History      Family History   Problem Relation Age of Onset     Heart Failure Mother       Social History      Social History     Tobacco Use     Smoking status: Never   Substance Use Topics     Alcohol use: Not  Currently      Allergies     Allergies   Allergen Reactions     Fosamax [Alendronic Acid]      Miacalcin [Calcitonin]      Prior to Admission Medications      Prior to Admission Medications   Prescriptions Last Dose Informant Patient Reported? Taking?   acetaminophen (TYLENOL) 500 MG tablet Past Month  Yes Yes   Sig: Take 500 mg by mouth every 4 hours as needed for mild pain   busPIRone (BUSPAR) 7.5 MG tablet 4/14/2023  Yes Yes   Sig: Take 7.5 mg by mouth At Bedtime   calcium carbonate-vitamin D (OSCAL) 500-5 MG-MCG tablet 4/15/2023  Yes Yes   Sig: Take 1 tablet by mouth daily   donepezil (ARICEPT) 10 MG tablet 4/14/2023  Yes Yes   Sig: Take 10 mg by mouth At Bedtime   furosemide (LASIX) 40 MG tablet 4/15/2023  Yes Yes   Sig: Take 40 mg by mouth daily   levothyroxine (SYNTHROID/LEVOTHROID) 50 MCG tablet 4/15/2023  Yes Yes   Sig: Take 50 mcg by mouth daily   meclizine (ANTIVERT) 25 MG tablet prn  Yes Yes   Sig: Take 25 mg by mouth 3 times daily as needed for dizziness   multivitamin, therapeutic (THERA-VIT) TABS tablet 4/15/2023  Yes Yes   Sig: Take 1 tablet by mouth daily   nystatin (MYCOSTATIN) 386855 UNIT/GM external powder 4/15/2023 at x1  Yes Yes   Sig: Apply topically 2 times daily Wash and dry area under breasts before applying powder   ondansetron (ZOFRAN ODT) 4 MG disintegrating tablet prn  No Yes   Sig: [ONDANSETRON (ZOFRAN ODT) 4 MG DISINTEGRATING TABLET] Take 1 tablet (4 mg total) by mouth every 8 (eight) hours as needed for nausea.   sertraline (ZOLOFT) 50 MG tablet 4/15/2023  Yes Yes   Sig: Take 50 mg by mouth daily      Facility-Administered Medications: None      Review of Systems     A 12 point comprehensive review of systems was negative except as noted above in HPI.    PHYSICAL EXAMINATION     Vitals      Temp:  [98.3  F (36.8  C)] 98.3  F (36.8  C)  Pulse:  [73-90] 90  Resp:  [14-26] 24  BP: (106-163)/(67-97) 154/97  SpO2:  [92 %-98 %] 92 %    Examination     Constitutional: awake, alert,  cooperative, no apparent distress, and appears stated age and mild discomfort but reassured by family  Eyes: Lids and lashes normal, pupils equal, round and reactive to light, extra ocular muscles intact, sclera clear, conjunctiva normal  ENT: Normocephalic, without obvious abnormality, atraumatic, sinuses nontender on palpation, external ears without lesions, oral pharynx with moist mucous membranes, tonsils without erythema or exudates, gums normal and good dentition.  Hematologic / Lymphatic: no cervical lymphadenopathy and no supraclavicular lymphadenopathy  Respiratory: No increased work of breathing, good air exchange, clear to auscultation bilaterally, no crackles or wheezing  Cardiovascular: Normal apical impulse, regular rate and rhythm, normal S1 and S2, no S3 or S4, and no murmur noted  GI: No scars, normal bowel sounds, soft, non-distended, non-tender, no masses palpated, no hepatosplenomegally  Skin: normal skin color, texture, turgor, no redness, warmth, or swelling, and no rashes  Musculoskeletal: Mild lower extremity edema.  Limited extremity exam secondary to hip fracture.  Does move her feet and toes bilaterally.  Neurologic: Cranial nerves II-XII are grossly intact. Sensory:  Sensory intact Deep Tendon Reflexes: Not performed secondary to hip fracture  Neuropsychiatric: General: fidgeting and poor eye contact Level of consciousness: drowsy Affect: anxious Orientation: impaired: Difficult to evaluate being nonverbal at this time.        Pertinent Lab     Results for orders placed or performed during the hospital encounter of 04/15/23 (from the past 24 hour(s))   [place] Draw    Narrative    The following orders were created for panel order [place] Draw.  Procedure                               Abnormality         Status                     ---------                               -----------         ------                     Extra Blue Top Tube[[id number]]                              Final result                Extra Red Top Tube[[id number]]                               Final result               Extra Green Top (Lithium...[[id number]]                      Final result               Extra Purple Top Tube[[id number]]                            Final result                 Please view results for these tests on the individual orders.   Extra Blue Top Tube   Result Value Ref Range    Hold Specimen JIC    Extra Red Top Tube   Result Value Ref Range    Hold Specimen JIC    Extra Green Top (Lithium Heparin) Tube   Result Value Ref Range    Hold Specimen JIC    Extra Purple Top Tube   Result Value Ref Range    Hold Specimen JIC    Troponin T, High Sensitivity   Result Value Ref Range    Troponin T, High Sensitivity 24 (H) <=14 ng/L   ABO/Rh type and screen    Narrative    The following orders were created for panel order ABO/Rh type and screen.  Procedure                               Abnormality         Status                     ---------                               -----------         ------                     Adult Type and Screen[[id number]]                            Final result                 Please view results for these tests on the individual orders.   Basic metabolic panel   Result Value Ref Range    Sodium 138 136 - 145 mmol/L    Potassium 4.0 3.4 - 5.3 mmol/L    Chloride 102 98 - 107 mmol/L    Carbon Dioxide (CO2) 25 22 - 29 mmol/L    Anion Gap 11 7 - 15 mmol/L    Urea Nitrogen 26.2 (H) 8.0 - 23.0 mg/dL    Creatinine 1.12 (H) 0.51 - 0.95 mg/dL    Calcium 9.2 8.8 - 10.2 mg/dL    Glucose 108 (H) 70 - 99 mg/dL    GFR Estimate 48 (L) >60 mL/min/1.73m2   CBC with platelets differential    Narrative    The following orders were created for panel order CBC with platelets differential.  Procedure                               Abnormality         Status                     ---------                               -----------         ------                     CBC with platelets and d...[[id number]]  Abnormal             Final result                 Please view results for these tests on the individual orders.   INR   Result Value Ref Range    INR 0.98 0.85 - 1.15   Magnesium   Result Value Ref Range    Magnesium 1.9 1.7 - 2.3 mg/dL   CBC with platelets and differential   Result Value Ref Range    WBC Count 13.2 (H) 4.0 - 11.0 10e3/uL    RBC Count 3.91 3.80 - 5.20 10e6/uL    Hemoglobin 12.7 11.7 - 15.7 g/dL    Hematocrit 38.7 35.0 - 47.0 %    MCV 99 78 - 100 fL    MCH 32.5 26.5 - 33.0 pg    MCHC 32.8 31.5 - 36.5 g/dL    RDW 12.9 10.0 - 15.0 %    Platelet Count 218 150 - 450 10e3/uL    % Neutrophils 86 %    % Lymphocytes 7 %    % Monocytes 7 %    % Eosinophils 0 %    % Basophils 0 %    % Immature Granulocytes 0 %    NRBCs per 100 WBC 0 <1 /100    Absolute Neutrophils 11.3 (H) 1.6 - 8.3 10e3/uL    Absolute Lymphocytes 0.9 0.8 - 5.3 10e3/uL    Absolute Monocytes 0.9 0.0 - 1.3 10e3/uL    Absolute Eosinophils 0.1 0.0 - 0.7 10e3/uL    Absolute Basophils 0.0 0.0 - 0.2 10e3/uL    Absolute Immature Granulocytes 0.0 <=0.4 10e3/uL    Absolute NRBCs 0.0 10e3/uL   Adult Type and Screen   Result Value Ref Range    ABO/RH(D) O POS     Antibody Screen Negative Negative    SPECIMEN EXPIRATION DATE [id number]    Head CT w/o contrast    Narrative    EXAM: CT HEAD W/O CONTRAST, CT CERVICAL SPINE W/O CONTRAST  LOCATION: [facility]  DATE/TIME: 4/15/2023 2:00 PM CDT    INDICATION: Unwitnessed fall. Traumatic head and cervical spine injury  COMPARISON: Head MRI 12/19/2011  TECHNIQUE:   1) Routine CT Head without IV contrast. Multiplanar reformats. Dose reduction techniques were used.  2) Routine CT Cervical Spine without IV contrast. Multiplanar reformats. Dose reduction techniques were used.    FINDINGS:   HEAD CT:   INTRACRANIAL CONTENTS: No intracranial hemorrhage, extraaxial collection, or mass effect.  No CT evidence of acute infarct. Moderate presumed chronic small vessel ischemic changes. Moderate generalized volume  loss. No hydrocephalus.     VISUALIZED ORBITS/SINUSES/MASTOIDS: No intraorbital abnormality. No paranasal sinus mucosal disease. No middle ear or mastoid effusion.    BONES/SOFT TISSUES: No acute abnormality.    CERVICAL SPINE CT:   VERTEBRA: Normal vertebral body heights and alignment. No fracture or posttraumatic subluxation.     CANAL/FORAMINA: Severe left C3-C4, right C4-C5 and bilateral C5-C6 neural foraminal stenoses. No high-grade spinal canal stenosis.    PARASPINAL: No extraspinal abnormality. Visualized lung fields are clear.      Impression    IMPRESSION:  HEAD CT:  1.  No CT evidence for acute intracranial process.  2.  Brain atrophy and presumed chronic microvascular ischemic changes as above.  3.  Expected progression of parenchymal volume loss since 2011.    CERVICAL SPINE CT:  1.  No CT evidence for acute fracture or post traumatic subluxation.  2.  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   XR Pelvis and Hip Left 2 Views    Narrative    EXAM: XR PELVIS AND HIP LEFT 2 VIEWS  LOCATION: [facility]  DATE/TIME: 4/15/2023 2:06 PM CDT    INDICATION: Left hip pain after a fall.  COMPARISON: None.      Impression    IMPRESSION: Acute left proximal femur fractures, a moderately comminuted fracture involving the lesser trochanter and the subtrochanteric proximal femur. Moderate fracture displacement and angulation. The left greater trochanter and the femoral neck   appear intact. The femoral head is intact. Normal left hip joint alignment, with maintained joint spacing and without significant arthritic changes. No acute bone or joint abnormality in the hips or pelvis otherwise. Moderate degenerative changes in the   intervertebral disc spaces and facet joints of the lower lumbar spine, within normal limits for age.   Cervical spine CT w/o contrast    Narrative    EXAM: CT HEAD W/O CONTRAST, CT CERVICAL SPINE W/O  CONTRAST  LOCATION: [facility]  DATE/TIME: 4/15/2023 2:00 PM CDT    INDICATION: Unwitnessed fall. Traumatic head and cervical spine injury  COMPARISON: Head MRI 12/19/2011  TECHNIQUE:   1) Routine CT Head without IV contrast. Multiplanar reformats. Dose reduction techniques were used.  2) Routine CT Cervical Spine without IV contrast. Multiplanar reformats. Dose reduction techniques were used.    FINDINGS:   HEAD CT:   INTRACRANIAL CONTENTS: No intracranial hemorrhage, extraaxial collection, or mass effect.  No CT evidence of acute infarct. Moderate presumed chronic small vessel ischemic changes. Moderate generalized volume loss. No hydrocephalus.     VISUALIZED ORBITS/SINUSES/MASTOIDS: No intraorbital abnormality. No paranasal sinus mucosal disease. No middle ear or mastoid effusion.    BONES/SOFT TISSUES: No acute abnormality.    CERVICAL SPINE CT:   VERTEBRA: Normal vertebral body heights and alignment. No fracture or posttraumatic subluxation.     CANAL/FORAMINA: Severe left C3-C4, right C4-C5 and bilateral C5-C6 neural foraminal stenoses. No high-grade spinal canal stenosis.    PARASPINAL: No extraspinal abnormality. Visualized lung fields are clear.      Impression    IMPRESSION:  HEAD CT:  1.  No CT evidence for acute intracranial process.  2.  Brain atrophy and presumed chronic microvascular ischemic changes as above.  3.  Expected progression of parenchymal volume loss since 2011.    CERVICAL SPINE CT:  1.  No CT evidence for acute fracture or post traumatic subluxation.  2.  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   XR Femur Left 2 Views    Narrative    EXAM: XR FEMUR LEFT 2 VIEWS  LOCATION: [facility]  DATE/TIME: 4/15/2023 3:48 PM CDT    INDICATION: Left proximal femur fracture.  COMPARISON: 04/15/2023 left hip.      Impression    IMPRESSION:   1.  Comminuted left proximal femur fracture  involving the lesser trochanter and subtrochanteric regions. No change in alignment or orientation.  2.  No additional fractures.  3.  Normal joint alignment.    UA with Microscopic reflex to Culture    Specimen: Urine, Midstream   Result Value Ref Range    Color Urine Light Yellow Colorless, Straw, Light Yellow, Yellow    Appearance Urine Clear Clear    Glucose Urine Negative Negative mg/dL    Bilirubin Urine Negative Negative    Ketones Urine Negative Negative mg/dL    Specific Gravity Urine 1.012 1.001 - 1.030    Blood Urine Negative Negative    pH Urine 5.5 5.0 - 7.0    Protein Albumin Urine Negative Negative mg/dL    Urobilinogen Urine <2.0 <2.0 mg/dL    Nitrite Urine Negative Negative    Leukocyte Esterase Urine 500 Jarrett/uL (A) Negative    RBC Urine 1 <=2 /HPF    WBC Urine 16 (H) <=5 /HPF    Squamous Epithelials Urine 1 <=1 /HPF    Narrative    Urine Culture ordered based on laboratory criteria   Troponin T, High Sensitivity (now)   Result Value Ref Range    Troponin T, High Sensitivity 23 (H) <=14 ng/L       Pertinent Radiology     Radiology Results:   Recent Results (from the past 24 hour(s))   Head CT w/o contrast    Narrative    EXAM: CT HEAD W/O CONTRAST, CT CERVICAL SPINE W/O CONTRAST  LOCATION: [facility]  DATE/TIME: 4/15/2023 2:00 PM CDT    INDICATION: Unwitnessed fall. Traumatic head and cervical spine injury  COMPARISON: Head MRI 12/19/2011  TECHNIQUE:   1) Routine CT Head without IV contrast. Multiplanar reformats. Dose reduction techniques were used.  2) Routine CT Cervical Spine without IV contrast. Multiplanar reformats. Dose reduction techniques were used.    FINDINGS:   HEAD CT:   INTRACRANIAL CONTENTS: No intracranial hemorrhage, extraaxial collection, or mass effect.  No CT evidence of acute infarct. Moderate presumed chronic small vessel ischemic changes. Moderate generalized volume loss. No hydrocephalus.     VISUALIZED ORBITS/SINUSES/MASTOIDS: No intraorbital  abnormality. No paranasal sinus mucosal disease. No middle ear or mastoid effusion.    BONES/SOFT TISSUES: No acute abnormality.    CERVICAL SPINE CT:   VERTEBRA: Normal vertebral body heights and alignment. No fracture or posttraumatic subluxation.     CANAL/FORAMINA: Severe left C3-C4, right C4-C5 and bilateral C5-C6 neural foraminal stenoses. No high-grade spinal canal stenosis.    PARASPINAL: No extraspinal abnormality. Visualized lung fields are clear.      Impression    IMPRESSION:  HEAD CT:  1.  No CT evidence for acute intracranial process.  2.  Brain atrophy and presumed chronic microvascular ischemic changes as above.  3.  Expected progression of parenchymal volume loss since 2011.    CERVICAL SPINE CT:  1.  No CT evidence for acute fracture or post traumatic subluxation.  2.  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   XR Pelvis and Hip Left 2 Views    Narrative    EXAM: XR PELVIS AND HIP LEFT 2 VIEWS  LOCATION: [facility]  DATE/TIME: 4/15/2023 2:06 PM CDT    INDICATION: Left hip pain after a fall.  COMPARISON: None.      Impression    IMPRESSION: Acute left proximal femur fractures, a moderately comminuted fracture involving the lesser trochanter and the subtrochanteric proximal femur. Moderate fracture displacement and angulation. The left greater trochanter and the femoral neck   appear intact. The femoral head is intact. Normal left hip joint alignment, with maintained joint spacing and without significant arthritic changes. No acute bone or joint abnormality in the hips or pelvis otherwise. Moderate degenerative changes in the   intervertebral disc spaces and facet joints of the lower lumbar spine, within normal limits for age.   Cervical spine CT w/o contrast    Narrative    EXAM: CT HEAD W/O CONTRAST, CT CERVICAL SPINE W/O CONTRAST  LOCATION: [facility]  DATE/TIME: 4/15/2023 2:00 PM  CDT    INDICATION: Unwitnessed fall. Traumatic head and cervical spine injury  COMPARISON: Head MRI 12/19/2011  TECHNIQUE:   1) Routine CT Head without IV contrast. Multiplanar reformats. Dose reduction techniques were used.  2) Routine CT Cervical Spine without IV contrast. Multiplanar reformats. Dose reduction techniques were used.    FINDINGS:   HEAD CT:   INTRACRANIAL CONTENTS: No intracranial hemorrhage, extraaxial collection, or mass effect.  No CT evidence of acute infarct. Moderate presumed chronic small vessel ischemic changes. Moderate generalized volume loss. No hydrocephalus.     VISUALIZED ORBITS/SINUSES/MASTOIDS: No intraorbital abnormality. No paranasal sinus mucosal disease. No middle ear or mastoid effusion.    BONES/SOFT TISSUES: No acute abnormality.    CERVICAL SPINE CT:   VERTEBRA: Normal vertebral body heights and alignment. No fracture or posttraumatic subluxation.     CANAL/FORAMINA: Severe left C3-C4, right C4-C5 and bilateral C5-C6 neural foraminal stenoses. No high-grade spinal canal stenosis.    PARASPINAL: No extraspinal abnormality. Visualized lung fields are clear.      Impression    IMPRESSION:  HEAD CT:  1.  No CT evidence for acute intracranial process.  2.  Brain atrophy and presumed chronic microvascular ischemic changes as above.  3.  Expected progression of parenchymal volume loss since 2011.    CERVICAL SPINE CT:  1.  No CT evidence for acute fracture or post traumatic subluxation.  2.  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   XR Femur Left 2 Views    Narrative    EXAM: XR FEMUR LEFT 2 VIEWS  LOCATION: [facility]  DATE/TIME: 4/15/2023 3:48 PM CDT    INDICATION: Left proximal femur fracture.  COMPARISON: 04/15/2023 left hip.      Impression    IMPRESSION:   1.  Comminuted left proximal femur fracture involving the lesser trochanter and subtrochanteric regions. No change in alignment or  orientation.  2.  No additional fractures.  3.  Normal joint alignment.      EKG Results: personally reviewed.   Normal sinus rhythm.  No ST-T wave changes.    Advance Care Planning        Efrain Reynoso MD  [facility]   Phone: #[phone]

## 2023-04-16 NOTE — ANESTHESIA CARE TRANSFER NOTE
Patient: Shanta Tello    Procedure: Procedure(s):  INTERNAL FIXATION, FRACTURE, TROCHANTERIC, HIP, USING PINS OR RODS       Diagnosis: Closed fracture of left hip, initial encounter (H) [S72.002A]  Diagnosis Additional Information: No value filed.    Anesthesia Type:   Spinal     Note:    Oropharynx: oropharynx clear of all foreign objects  Level of Consciousness: drowsy  Oxygen Supplementation: face mask  Level of Supplemental Oxygen (L/min / FiO2): 8  Independent Airway: airway patency satisfactory and stable  Dentition: dentition unchanged  Vital Signs Stable: post-procedure vital signs reviewed and stable  Report to RN Given: handoff report given  Patient transferred to: PACU    Handoff Report: Identifed the Patient, Identified the Reponsible Provider, Reviewed the pertinent medical history, Discussed the surgical course, Reviewed Intra-OP anesthesia mangement and issues during anesthesia, Set expectations for post-procedure period and Allowed opportunity for questions and acknowledgement of understanding      Vitals:  Vitals Value Taken Time   /59 04/16/23 1334   Temp 36.1  C (97  F) 04/16/23 1322   Pulse 73 04/16/23 1335   Resp 14 04/16/23 1335   SpO2 99 % 04/16/23 1335   Vitals shown include unvalidated device data.    Electronically Signed By: CONRADO Jaime CRNA  April 16, 2023  1:37 PM

## 2023-04-16 NOTE — CONSULTS
Care Management Initial Consult    General Information  Assessment completed with: Children (Angela Burciaga  Type of CM/SW Visit: Initial Assessment    Primary Care Provider verified and updated as needed: Yes   Readmission within the last 30 days: no previous admission in last 30 days      Reason for Consult: discharge planning  Advance Care Planning: Advance Care Planning Reviewed:  ([facility])          Communication Assessment  Patient's communication style: spoken language (English or Bilingual)             Cognitive  Cognitive/Neuro/Behavioral: .WDL except, orientation  Level of Consciousness: confused  Arousal Level: opens eyes spontaneously  Orientation: disoriented to, place, time, situation  Mood/Behavior: cooperative  Best Language: 0 - No aphasia  Speech: clear, spontaneous, logical    Living Environment:   People in home: facility resident     Current living Arrangements: assisted living ([facility] in [facility])  Name of Facility: [facility] in [facility]   Able to return to prior arrangements: yes       Family/Social Support:  Care provided by: other (see comments) (facility staff)  Provides care for: no one, unable/limited ability to care for self  Marital Status:   Children          Description of Support System: Supportive, Involved         Current Resources:   Patient receiving home care services:       Community Resources:    Equipment currently used at home:    Supplies currently used at home:      Employment/Financial:  Employment Status:          Financial Concerns: No concerns identified           Lifestyle & Psychosocial Needs:  Social Determinants of Health     Tobacco Use: Unknown (4/15/2023)    Patient History      Smoking Tobacco Use: Never      Smokeless Tobacco Use: Unknown      Passive Exposure: Not on file   Alcohol Use: Not on file   Financial Resource Strain: Not on file   Food Insecurity: Not on file   Transportation Needs: Not on file    Physical Activity: Not on file   Stress: Not on file   Social Connections: Not on file   Intimate Partner Violence: Not on file   Depression: Not on file   Housing Stability: Not on file       Functional Status:  Prior to admission patient needed assistance:   Dependent ADLs:: Bathing, Dressing, Grooming, Incontinence  Dependent IADLs:: Cleaning, Cooking, Laundry, Shopping, Meal Preparation, Medication Management, Money Management, Transportation, Incontinence       Mental Health Status:  Mental Health Status:  (Dementia)       Chemical Dependency Status:  Chemical Dependency Status: No Current Concerns             Values/Beliefs:  Spiritual, Cultural Beliefs, Confucianism Practices, Values that affect care:                 Additional Information:  RNCM met with patient and daughter Angela, introduced self and CM role.  Assessed via daughter Angela.  Pt resides at [facility] in [facility].  At baseline, pt does not use DME for ambulation.  Facility staff assists with I/ADL's - bathing, dressing, medication management, meals, laundry.  Angela manages pt's finances.    Angela presented writer with pt's HCD - sent to [facility] marked as urgent.  Pt noted to be full code, but pt's HCD stated DNR/DNI.  Daughter Angela wanted to change pt's code status to DNR/DNI.  Paged hospitalist to notify him.    If TCU is recommended, Angela would be accepting of TCU placement for pt.  Angela is hopeful pt can then discharge from TCU back to [facility].    Reviewed agency transport and potential private pay of both wheelchair and stretcher, also reviewed possible need for stretcher due to pt confusion.  Angela understanding that family transport may not be safe for pt at time of discharge, would like to further discuss closer to discharge.    11:11 AM  [facility] - Writer spoke to nurse Shankar, who confirms pt is a resident there.  Pt often nonverbal, sometimes may say a full sentence.  At baseline, pt does  not use any DME for ambulation, and requires hands-on staff assist for all I/ADL's.    CM will continue to follow care progression and aide in discharge planning as needed.    Zain Machuca RN

## 2023-04-16 NOTE — ANESTHESIA PROCEDURE NOTES
"Intrathecal Procedure Note    Pre-Procedure   Staff -        Anesthesiologist:  Roge Hyatt MD       Performed By: anesthesiologist       Location: OR       Procedure Start/Stop Times: 4/16/2023 12:01 PM and 4/16/2023 12:04 PM       Pre-Anesthestic Checklist: patient identified, IV checked, risks and benefits discussed, informed consent, monitors and equipment checked, pre-op evaluation and at physician/surgeon's request  Timeout:       Correct Patient: Yes        Correct Procedure: Yes        Correct Site: Yes        Correct Position: Yes   Procedure Documentation  Procedure: intrathecal       Patient Position: RLD       Patient Prep/Sterile Barriers: sterile gloves, mask, patient draped       Skin prep: Chloraprep       Insertion site: L5-S1. (right paramedian approach).       Needle Gauge: 22.        Needle Length (Inches): 3.5        Spinal Needle Type: Quincke       Introducer used       Introducer: 20 G       # of attempts: 1 and  # of redirects:     Assessment/Narrative         Paresthesias: No.       CSF fluid: clear.    Medication(s) Administered   0.75% Hyperbaric Bupivacaine (Intrathecal) - Intrathecal   2 mL - 4/16/2023 12:04:00 PM  Medication Administration Time: 4/16/2023 12:01 PM      FOR [facility] ONLY:   Pain Team Contact information: please page the Pain Team Via [employer]. Search \"Pain\". During daytime hours, please page the attending first. At night please page the resident first.      "

## 2023-04-16 NOTE — PLAN OF CARE
Problem: Pain Acute  Goal: Optimal Pain Control and Function  Outcome: Progressing  Intervention: Prevent or Manage Pain  Recent Flowsheet Documentation  Taken 4/16/2023 0830 by Julia Jenikns RN  Sensory Stimulation Regulation: television on  Medication Review/Management: medications reviewed     Problem: Hip Fracture Surgical Repair  Goal: Optimal Coping with Change in Health Status  Outcome: Progressing     Problem: Hip Fracture Surgical Repair  Goal: Anesthesia/Sedation Recovery  Outcome: Progressing  Intervention: Optimize Anesthesia Recovery  Recent Flowsheet Documentation  Taken 4/16/2023 1500 by Julia Jenkins RN  Safety Promotion/Fall Prevention: safety round/check completed  Administration (IS): (pt. does not follow instructions) unable to perform  Taken 4/16/2023 0830 by Julia Jenkins RN  Safety Promotion/Fall Prevention: safety round/check completed    Patient vital signs are at baseline: Yes    Patient able to ambulate as they were prior to admission or with assist devices provided by therapies during their stay:  No,  Reason:  pain and cognitive impairment limiting mobility. Pt. Got up to the bedside commode during this shift.    Patient MUST void prior to discharge:  Yes    Patient able to tolerate oral intake:  Yes    Pain has adequate pain control using Oral analgesics:  Yes, however, staff unable to assess pain appropriately d/t pt.'s cognitive impairment which makes her to be alert to self and unable to answer questions correctly.    Does patient have an identified :  Yes    Has goal D/C date and time been discussed with patient:  No,  Reason:  OT/PT yet to eval and treat pt. Case management following up with pt. Discharge to be determined.        Pt. A&O to self only. Pt. Unable to verbalize pain level. Prn dilaudid, tylenol administered alongside with use of ice pack to help manage pain. Scant drainage observed on the 2 small dressings. Drainage sites marked and ice applied.  Daughter has been at bedside and she's been helpful with cares, (pt. More comfortable with a familiar face around). Pt. Tolerated PO intake though minimal. Able to swallow crushed meds with apple sauce. Pt. Got up to bedside commode with heavy assist of 2, pt. Needs cueing and short simple sentences to follow through during cares.

## 2023-04-16 NOTE — PHARMACY-ADMISSION MEDICATION HISTORY
Pharmacist Admission Medication History    Admission medication history is complete. The information provided in this note is only as accurate as the sources available at the time of the update.    Medication reconciliation/reorder completed by provider prior to medication history? No    Information Source(s): Facility ([facility] medication list/MAR via N/A    Pertinent Information:     Changes made to PTA medication list:    Added: all meds    Deleted: None    Changed: None    Medication Affordability:       Allergies reviewed with patient and updates made in EHR: yes    Medication History Completed By: Chelsey Auguste [facility] 4/15/2023 7:27 PM    PTA Med List   Medication Sig Last Dose     acetaminophen (TYLENOL) 500 MG tablet Take 500 mg by mouth every 4 hours as needed for mild pain Past Month     busPIRone (BUSPAR) 7.5 MG tablet Take 7.5 mg by mouth At Bedtime 4/14/2023     calcium carbonate-vitamin D (OSCAL) 500-5 MG-MCG tablet Take 1 tablet by mouth daily 4/15/2023     donepezil (ARICEPT) 10 MG tablet Take 10 mg by mouth At Bedtime 4/14/2023     furosemide (LASIX) 40 MG tablet Take 40 mg by mouth daily 4/15/2023     levothyroxine (SYNTHROID/LEVOTHROID) 50 MCG tablet Take 50 mcg by mouth daily 4/15/2023     meclizine (ANTIVERT) 25 MG tablet Take 25 mg by mouth 3 times daily as needed for dizziness prn     multivitamin, therapeutic (THERA-VIT) TABS tablet Take 1 tablet by mouth daily 4/15/2023     nystatin (MYCOSTATIN) 138351 UNIT/GM external powder Apply topically 2 times daily Wash and dry area under breasts before applying powder 4/15/2023 at x1     ondansetron (ZOFRAN ODT) 4 MG disintegrating tablet [ONDANSETRON (ZOFRAN ODT) 4 MG DISINTEGRATING TABLET] Take 1 tablet (4 mg total) by mouth every 8 (eight) hours as needed for nausea. prn     sertraline (ZOLOFT) 50 MG tablet Take 50 mg by mouth daily 4/15/2023

## 2023-04-16 NOTE — OP NOTE
PREOPERATIVE DIAGNOSIS: left subtrochanteric hip fracture.        POSTOPERATIVE DIAGNOSIS:  left subtrochanteric hip fracture.        PROCEDURE:  Cephalomedullary nailing of  left subtrochanteric hip fracture.        SURGEON:  Brian Arredondo MD        ASSISTANT:  Alicia Kurtz PA-C.  The presence of a PA was necessary for position, retraction and safe progression of the case.        ANESTHESIA:  Spinal.        ESTIMATED BLOOD LOSS:  50 mL.        COMPLICATIONS:  None apparent.        IMPLANTS USED:  Mary long Gamma nail [id number].        DISPOSITION:  Stable to PACU.        INDICATIONS:   After discussing treatment options, she agreed to proceed with cephalomedullary nailing to expedite mobilization and optimize long-term hip function.  she understood the risks of infection, damage to vessels and nerves, ongoing pain, nonunion, need for revision surgery, blood clots, and the risks inherent to anesthesia.        DESCRIPTION OF PROCEDURE:  Shanta Tello was met in the preoperative holding area where the left hip was marked.  Consent was reviewed. she was then transferred to the operating theater.  After smooth induction of spinal anesthetic, she was transitioned to a HANA table.  All bony prominences were well padded.  The well leg was extended and adducted with no traction.  Then with gentle traction and slight internal rotation, we performed a closed reduction of the left hip.  This was confirmed with multiplanar fluoroscopy.  Following this, a timeout was performed, verifying the correct patient, surgery and location.  she received preoperative antibiotics.  The left lower extremity was then prepped and draped in standard sterile fashion.        We then made a 3 cm longitudinal incision just proximal to the tip of the greater trochanter in line with the femur.  Dissection was sharply carried down through skin and subcutaneous tissue.  We used a guidewire to find the starting point for trochanteric nail,  confirmed with multiplanar fluoroscopy.  We then advanced the guidewire and used a proximal opening reamer to the level of the lesser trochanter.  Following this, we passed the nail, taking care to place the level of the lag screw at the proper position in line with the femoral head.  We then used the jig on the nail to place the lag screw.  First, we made a small percutaneous incision over the lateral thigh.  We then used a guidewire for the lag screw and placed this in the center-center position on multiplanar fluoroscopy.  Lag screw measured 90mm.  We then drilled the outer cortex and drilled for the lag screw, and finally placed the screw with good purchase.  Multiplanar fluoroscopy demonstrated acceptable fracture reduction and screw placement.  Once the lag screw was in place, we released traction.  We then set the proximal set screw to lock rotation.  Following this, we removed the jig for the lag screw and moved distal.  We used perfect [place] technique to identify the distal holes.  We made small percutaneous incisions and placed two 5.0 mm distal interlocking screws.  Final fluoroscopy was obtained which demonstrated adequate fracture reduction as well as adequate hardware placement.  All wounds were thoroughly irrigated.  Subcutaneous layer was closed with 2-0 vicryl and skin with staples.  A dressing of 4x4 gauze and tegaderm was applied and patient was awoken from anesthesia and transferred to PACU in stable condition.        POSTOPERATIVE PLAN:    1.  Weightbearing as tolerated left lower extremity with PT for mobilization.    2.  DVT prophylaxis: asa 81mg BID  3.  Perioperative antibiotics.    4.  Follow up in 2 weeks for wound check, sooner if questions or concerns.

## 2023-04-16 NOTE — PLAN OF CARE
Problem: Plan of Care - These are the overarching goals to be used throughout the patient stay.    Goal: Absence of Hospital-Acquired Illness or Injury  Intervention: Identify and Manage Fall Risk  Recent Flowsheet Documentation  Taken 4/16/2023 0330 by Catrina Holm, RN  Safety Promotion/Fall Prevention: safety round/check completed  Taken 4/16/2023 0057 by Catrina Holm, RN  Safety Promotion/Fall Prevention:   clutter free environment maintained   increased rounding and observation   increase visualization of patient   lighting adjusted   nonskid shoes/slippers when out of bed   patient and family education   room door open   room near nurse's station   room organization consistent   supervised activity     Pt A&O to self only. VSS on RA. Incontinent of urine x1 overnight. NS at 100 ml/hr. NPO for surgical intervention at 1100. Bedrest, micro turning for comfort. Pain managed with rest. Pt agitated with movement otherwise resting in bed. Will continue to monitor.

## 2023-04-16 NOTE — ANESTHESIA POSTPROCEDURE EVALUATION
Patient: Shanta Tello    Procedure: Procedure(s):  INTERNAL FIXATION, FRACTURE, TROCHANTERIC, HIP, USING PINS OR RODS       Anesthesia Type:  Spinal    Note:     Postop Pain Control: Uneventful            Sign Out: Well controlled pain   PONV: No   Neuro/Psych: Uneventful            Sign Out: Acceptable/Baseline neuro status   Airway/Respiratory: Uneventful            Sign Out: Acceptable/Baseline resp. status   CV/Hemodynamics: Uneventful            Sign Out: Acceptable CV status; No obvious hypovolemia; No obvious fluid overload   Other NRE: NONE   DID A NON-ROUTINE EVENT OCCUR? No           Last vitals:  Vitals Value Taken Time   /55 04/16/23 1441   Temp 36.2  C (97.1  F) 04/16/23 1440   Pulse 84 04/16/23 1446   Resp 30 04/16/23 1443   SpO2 91 % 04/16/23 1446   Vitals shown include unvalidated device data.    Electronically Signed By: Roge Hyatt MD  April 16, 2023  4:57 PM

## 2023-04-16 NOTE — PROGRESS NOTES
[facility]    Medicine Progress Note - Hospitalist Service    Date of Admission:  4/15/2023    Assessment & Plan   Principal Problem:    Closed left hip fracture (H) (4/15/2023)-status post repair 4/16/2023  Active Problems:    Dementia (H) (4/15/2023)    Hypothyroidism (4/15/2023)    Hyperlipidemia (4/15/2023)    Bilateral lower extremity edema (4/15/2023)    DNR (do not resuscitate) (4/16/2023)    She is stable postop after hip repair.  Blood pressure was trending low in the postanesthesia care unit but now is in the 110 range.  She has dementia.  Discussed earlier with daughter and nurse regarding DNR status       Diet: Advance Diet as Tolerated: Regular Diet Adult    DVT Prophylaxis: Aspirin  Dotson Catheter: Not present  Lines: None     Cardiac Monitoring: None  Code Status: No CPR- Do NOT Intubate      Clinically Significant Risk Factors Present on Admission                    # Dementia: noted on problem list            Disposition Plan      Expected Discharge Date: 04/17/2023                  Reginaldo Robles MD  Hospitalist Service  [facility]  Securely message with [employer] (more info)  Text page via [employer] Paging/Directory   ______________________________________________________________________    Interval History   Status post status post repair of hip fracture this morning stable    Physical Exam   Vital Signs: Temp: 97.1  F (36.2  C) Temp src: Temporal BP: 113/55 Pulse: 92   Resp: 18 SpO2: 93 % O2 Device: None (Room air) Oxygen Delivery: 2 LPM  Weight: 0 lbs 0 oz    She is in bed somnolent pain is controlled systolic blood pressure 110 pulse regular    Medical Decision Making   25 minutes      Data   Recent Results (from the past 24 hour(s))   UA with Microscopic reflex to Culture    Collection Time: 04/15/23  4:08 PM    Specimen: Urine, Midstream   Result Value Ref Range    Color Urine Light Yellow Colorless, Straw, Light Yellow, Yellow    Appearance  Urine Clear Clear    Glucose Urine Negative Negative mg/dL    Bilirubin Urine Negative Negative    Ketones Urine Negative Negative mg/dL    Specific Gravity Urine 1.012 1.001 - 1.030    Blood Urine Negative Negative    pH Urine 5.5 5.0 - 7.0    Protein Albumin Urine Negative Negative mg/dL    Urobilinogen Urine <2.0 <2.0 mg/dL    Nitrite Urine Negative Negative    Leukocyte Esterase Urine 500 Jarrett/uL (A) Negative    RBC Urine 1 <=2 /HPF    WBC Urine 16 (H) <=5 /HPF    Squamous Epithelials Urine 1 <=1 /HPF   Urine Culture    Collection Time: 04/15/23  4:08 PM    Specimen: Urine, Midstream   Result Value Ref Range    Culture Culture in progress    Troponin T, High Sensitivity (now)    Collection Time: 04/15/23  5:50 PM   Result Value Ref Range    Troponin T, High Sensitivity 23 (H) <=14 ng/L   Glucose by meter    Collection Time: 04/15/23  8:26 PM   Result Value Ref Range    GLUCOSE BY METER POCT 136 (H) 70 - 99 mg/dL   TSH    Collection Time: 04/16/23  9:09 AM   Result Value Ref Range    TSH 10.02 (H) 0.30 - 5.00 uIU/mL

## 2023-04-16 NOTE — PLAN OF CARE
ORTHO PLAN OF CARE    X-rays reviewed showing a displaced left inter/subtrochanteric femur fracture. Will require surgical fixation. Appreciate hospitalist admission for medical optimization and surgical clearance. Pending medical clearance, would plan for surgery tomorrow.    NPO after midnight. Bedrest. NWB LLE.    Formal consult to follow.    Bernardo Fajardo MD ................. 4/15/2023 7:48 PM  [facility]

## 2023-04-16 NOTE — ANESTHESIA PREPROCEDURE EVALUATION
Anesthesia Pre-Procedure Evaluation    Patient: Shanta Tello   MRN: [id number] : 1940        Procedure : Procedure(s):  INTERNAL FIXATION, FRACTURE, TROCHANTERIC, HIP, USING PINS OR RODS          Past Medical History:   Diagnosis Date     Bilateral lower extremity edema      Dementia (H)      Hyperlipidemia      Hypothyroidism       Past Surgical History:   Procedure Laterality Date     Meniscal tear repair        Allergies   Allergen Reactions     Fosamax [Alendronic Acid]      Miacalcin [Calcitonin]       Social History     Tobacco Use     Smoking status: Never     Smokeless tobacco: Not on file   Vaping Use     Vaping status: Not on file   Substance Use Topics     Alcohol use: Not Currently      Wt Readings from Last 1 Encounters:   No data found for Wt        Anesthesia Evaluation   Pt has had prior anesthetic.     No history of anesthetic complications       ROS/MED HX  ENT/Pulmonary:  - neg pulmonary ROS     Neurologic:     (+) dementia (nonverbal currently, but opens eyes to voice),     Cardiovascular:     (+) Dyslipidemia -----    METS/Exercise Tolerance:     Hematologic:  - neg hematologic  ROS     Musculoskeletal:       GI/Hepatic:  - neg GI/hepatic ROS     Renal/Genitourinary: Comment: Slightly elevated Cr      Endo:     (+) thyroid problem,     Psychiatric/Substance Use:       Infectious Disease:       Malignancy:       Other:            Physical Exam    Airway   unable to assess          Respiratory Devices and Support         Dental     Comment: No dentures per daughter unable to assess        Cardiovascular          Rhythm and rate: regular and normal     Pulmonary           breath sounds clear to auscultation           OUTSIDE LABS:  CBC:   Lab Results   Component Value Date    WBC 13.2 (H) 04/15/2023    HGB 12.7 04/15/2023    HCT 38.7 04/15/2023     04/15/2023     BMP:   Lab Results   Component Value Date     04/15/2023     2023    POTASSIUM 4.0 04/15/2023     POTASSIUM 4.1 01/12/2023    CHLORIDE 102 04/15/2023    CHLORIDE 105 01/12/2023    CO2 25 04/15/2023    CO2 24 01/12/2023    BUN 26.2 (H) 04/15/2023    BUN 27.6 (H) 01/12/2023    CR 1.12 (H) 04/15/2023    CR 1.02 (H) 01/12/2023     (H) 04/15/2023     (H) 04/15/2023     COAGS:   Lab Results   Component Value Date    INR 0.98 04/15/2023     POC: No results found for: BGM, HCG, HCGS  HEPATIC:   Lab Results   Component Value Date    ALBUMIN 3.6 08/25/2021    PROTTOTAL 6.5 08/25/2021    ALT 17 08/25/2021    AST 15 08/25/2021    ALKPHOS 97 08/25/2021    BILITOTAL 0.4 08/25/2021     OTHER:   Lab Results   Component Value Date    TIM 9.2 04/15/2023    MAG 1.9 04/15/2023    TSH 10.02 (H) 04/16/2023    T4 1.05 04/06/2022       Anesthesia Plan    ASA Status:  3   NPO Status:  NPO Appropriate    Anesthesia Type: Spinal.              Consents    Anesthesia Plan(s) and associated risks, benefits, and realistic alternatives discussed. Questions answered and patient/representative(s) expressed understanding.     - Discussed: Risks, Benefits and Alternatives for the PROCEDURE were discussed     - Discussed with:  Healthcare Power of  (daughter)      - Patient is DNR/DNI Status: Yes             Suspend during perioperative period? Yes.         Postoperative Care    Pain management: Peripheral nerve block (Single Shot), IV analgesics, Oral pain medications, Multi-modal analgesia.   PONV prophylaxis: Ondansetron (or other 5HT-3), Dexamethasone or Solumedrol     Comments:                Roge Hyatt MD

## 2023-04-16 NOTE — CONSULTS
ORTHOPEDIC CONSULTATION    Consultation  Shanta Tello,  1940, MRN [id number]    Closed left hip fracture (H) [S72.002A]    PCP: Reginaldo Red, [phone]   Code status:  Full Code       Extended Emergency Contact Information  Primary Emergency Contact: Angela Moreira   [facility]  Home Phone: [phone]  Mobile Phone: [phone]  Relation: Daughter  Secondary Emergency Contact: Esequiel Moreira   [facility]  Home Phone: [phone]  Relation: Son-in-Law         IMPRESSION:   Left subtrochanteric femur fracture     PLAN:   I discussed with the patient and her daughter today that I recommended fixation for pain control as well as benefit of early ambulation.  We discussed this plan for surgery today and we will plan for operative fixation.  All questions were answered today.  Tentative plan is for surgery at 11 AM.  She will remain on bedrest and nonweightbearing for now.  Thank you for including [facility] in the care of Shanta Tello. It has been a pleasure participating in her care.        CHIEF COMPLAINT: Closed left hip fracture (H)    HISTORY OF PRESENT ILLNESS:  The patient is seen in orthopedic consultation at the request of the hospitalist.  The patient is a 82 year old female with severe pain of the left  hip. The patient reports that she has pain in her left hip and thigh.  The patient resides at a memory care facility and her daughter is here with her today.  She reports that there was a fall yesterday evening.  The patient describes their pain as achy and sharp at times. They report that their pain does not radiate. The patient reports that their pain is alleviated by not moving and is exacerbated by any movement.      ALLERGIES:   Review of patient's allergies indicates   Allergies   Allergen Reactions     Fosamax [Alendronic Acid]      Miacalcin [Calcitonin]          MEDICATIONS UPON ADMISSION:  Medications were reviewed.  They include:   Medications Prior to Admission    Medication Sig Dispense Refill Last Dose     acetaminophen (TYLENOL) 500 MG tablet Take 500 mg by mouth every 4 hours as needed for mild pain   Past Month     busPIRone (BUSPAR) 7.5 MG tablet Take 7.5 mg by mouth At Bedtime   4/14/2023     calcium carbonate-vitamin D (OSCAL) 500-5 MG-MCG tablet Take 1 tablet by mouth daily   4/15/2023     donepezil (ARICEPT) 10 MG tablet Take 10 mg by mouth At Bedtime   4/14/2023     furosemide (LASIX) 40 MG tablet Take 40 mg by mouth daily   4/15/2023     levothyroxine (SYNTHROID/LEVOTHROID) 50 MCG tablet Take 50 mcg by mouth daily   4/15/2023     meclizine (ANTIVERT) 25 MG tablet Take 25 mg by mouth 3 times daily as needed for dizziness   prn     multivitamin, therapeutic (THERA-VIT) TABS tablet Take 1 tablet by mouth daily   4/15/2023     nystatin (MYCOSTATIN) 427811 UNIT/GM external powder Apply topically 2 times daily Wash and dry area under breasts before applying powder   4/15/2023 at x1     ondansetron (ZOFRAN ODT) 4 MG disintegrating tablet [ONDANSETRON (ZOFRAN ODT) 4 MG DISINTEGRATING TABLET] Take 1 tablet (4 mg total) by mouth every 8 (eight) hours as needed for nausea. 30 tablet 0 prn     sertraline (ZOLOFT) 50 MG tablet Take 50 mg by mouth daily   4/15/2023         SOCIAL HISTORY:   she  reports that she has never smoked. She does not have any smokeless tobacco history on file. She reports that she does not currently use alcohol.      FAMILY HISTORY:  family history includes Heart Failure in her mother.      REVIEW OF SYSTEMS:   Reviewed with patient. See HPI, otherwise negative       PHYSICAL EXAMINATION:  Vitals: Temp:  [98.2  F (36.8  C)-98.8  F (37.1  C)] 98.8  F (37.1  C)  Pulse:  [] 104  Resp:  [14-26] 17  BP: ()/(45-97) 133/66  SpO2:  [92 %-98 %] 94 %  General: On examination, the patient is NAD, sleepy and delirous and oriented to person and general circumstances   SKIN: There is no evidence of skin break  Pulses:  dorsalis pedis and posterior  tibial pulse is intact and equal bilaterally  Sensation: intact and equal bilaterally to the distal lower extremities.  Tenderness: left thigh  ROM: Full painless passive range of motion of bilateral upper extremities and right lower extremity.  Left lower extremity no pain with motion of her ankle and minimal knee due to pain in the left thigh.  Motor: She is able to actively flex and extend her ankles bilaterally otherwise has difficulty following instruction.  Contralateral side= Full range of motion, Negative joint instability findings, 5/5 motor groups about the joint, Non-tender.       RADIOGRAPHIC EVALUATION:  Personally reviewed left subtrochanteric femur fracture    PERTINENT LABS:  Lab Results: personally reviewed.     Lab Results   Component Value Date    WBC 13.2 04/15/2023    HGB 12.7 04/15/2023    HCT 38.7 04/15/2023    MCV 99 04/15/2023     04/15/2023         Brian Arredondo MD, PA-C  Date: 4/16/2023  Time: 8:02 AM    CC1:   Reginaldo Robles MD    CC2:   Reginaldo Red

## 2023-04-17 NOTE — PROGRESS NOTES
04/17/23 0734   Appointment Info   Signing Clinician's Name / Credentials (PT) Rohan Frank   Living Environment   People in Home alone   Current Living Arrangements other (see comments)  (memory care)   Home Accessibility no concerns   Transportation Anticipated agency   Self-Care   Usual Activity Tolerance moderate   Current Activity Tolerance fair   Fall history within last six months yes   Number of times patient has fallen within last six months   (possiby multiple)   Activity/Exercise/Self-Care Comment ambulated by self without device prior.  Needing assist with all ADL   General Information   Onset of Illness/Injury or Date of Surgery 04/15/23   Pertinent History of Current Problem (include personal factors and/or comorbidities that impact the POC) Closed left hip fracture (H) (Chronic) 4/15/2023    Dementia (H) (Chronic) 4/15/2023    Hypothyroidism 4/15/2023    Hyperlipidemia 4/15/2023    Bilateral lower extremity edema 4/15/2023    DNR (do not resuscitate) (Chronic) 4/16/2023    Status post-operative repair of hip fracture 4/16/2023   Existing Precautions/Restrictions no known precautions/restrictions   Cognition   Affect/Mental Status (Cognition) confused   Follows Commands (Cognition) follows one-step commands;25-49% accuracy   Posture    Posture Not impaired   Range of Motion (ROM)   Range of Motion ROM deficits secondary to surgical procedure   Strength (Manual Muscle Testing)   Strength Comments general weakness   Bed Mobility   Bed Mobility supine-sit   Supine-Sit Hand (Bed Mobility) maximum assist (25% patient effort);2 person assist;verbal cues   Transfers   Transfers sit-stand transfer   Sit-Stand Transfer   Sit-Stand Hand (Transfers) moderate assist (50% patient effort);2 person assist;verbal cues   Assistive Device (Sit-Stand Transfers) walker, front-wheeled   Gait/Stairs (Locomotion)   Hand Level (Gait) minimum assist (75% patient effort);2 person assist;verbal  cues;supervision;nonverbal cues (demo/gesture)   Assistive Device (Gait) walker, front-wheeled   Distance in Feet 4   Pattern (Gait) step-to   Deviations/Abnormal Patterns (Gait) wally decreased;festinating/shuffling   Maintains Weight-bearing Status (Gait) able to maintain   Balance   Balance no deficits were identified   Clinical Impression   Criteria for Skilled Therapeutic Intervention Yes, treatment indicated   PT Diagnosis (PT) impaired functional mobility   Influenced by the following impairments weakness, pain   Functional limitations due to impairments transfers, gait   Clinical Presentation (PT Evaluation Complexity) Stable/Uncomplicated   Clinical Presentation Rationale Pt. presents as medically diagnosed   Clinical Decision Making (Complexity) low complexity   Planned Therapy Interventions (PT) patient/family education;gait training;home exercise program;transfer training   Anticipated Equipment Needs at Discharge (PT) walker, rolling   Risk & Benefits of therapy have been explained evaluation/treatment results reviewed;risks/benefits reviewed;patient;daughter   PT Total Evaluation Time   PT Eval, Low Complexity Minutes (41926) 15   Plan of Care Review   Plan of Care Reviewed With patient;daughter   Physical Therapy Goals   PT Frequency 2x/day   PT Predicted Duration/Target Date for Goal Attainment 04/25/23   PT Goals Transfers;Gait;PT Goal 1   PT: Transfers Supervision/stand-by assist;Sit to/from stand;Assistive device;Within precautions   PT: Gait Minimal assist;Rolling walker;25 feet;Within precautions   PT: Goal 1 Able to do JULIUS HEP wiht  min assist/cueing   Interventions   Interventions Quick Adds Therapeutic Procedure   Therapeutic Procedure/Exercise   Ther. Procedure: strength, endurance, ROM, flexibillity Minutes (04908) 8   Symptoms Noted During/After Treatment none   Treatment Detail/Skilled Intervention Attempting to have patient perform JULIUS HEP, needing maximum cueing and manual assist to  perform.  Paitent struggling to follow cueing on exercise ; Ankle pump, quad set, heel slides x 10   PT Discharge Planning   PT Plan progress   PT Discharge Recommendation (DC Rec) Transitional Care Facility   PT Rationale for DC Rec Patient prior to surgery was ambulating independently.   Patient  would benefit from further rehab for gait/transfers.   Paitent is WBAT and no formal precautions so should be able to progress as pain declines   PT Brief overview of current status Patient needs mod-max asssist of 2 for transfers, gait   Total Session Time   Timed Code Treatment Minutes 8   Total Session Time (sum of timed and untimed services) 23

## 2023-04-17 NOTE — PROGRESS NOTES
Care Management Follow Up    Length of Stay (days): 2    Expected Discharge Date: 04/18/2023     Concerns to be Addressed: discharge planning     Patient plan of care discussed at interdisciplinary rounds: Yes    Anticipated Discharge Disposition: Transitional Care     Anticipated Discharge Services: None  Anticipated Discharge DME: None    Patient/family educated on Medicare website which has current facility and service quality ratings:    Education Provided on the Discharge Plan:    Patient/Family in Agreement with the Plan: yes    Referrals Placed by CM/SW:    Private pay costs discussed: Not applicable    Additional Information:  10:34 AM  GENET met with patient and daughter Angela in room.  Daughter agreeable to TCU at discharge.  Requested referrals be sent to [facility] and [facility] on .  SW sent referrals.  Daughter requesting transport at discharge and agreeable to any potential cost.      HAROLDO Segura

## 2023-04-17 NOTE — PLAN OF CARE
Problem: Plan of Care - These are the overarching goals to be used throughout the patient stay.    Goal: Optimal Comfort and Wellbeing  Outcome: Progressing  Intervention: Monitor Pain and Promote Comfort     Goal Outcome Evaluation:       Pt only alert to self. Pain managed with tylenol. Pt becomes agitated with movement but appears comfortable after. Incontinent of bladder and bowel. Q2 turning. Pt pulled IV out, SWAT attempted to place new IV but unable to obtain access. Pt removed one dressing on leg, writer placed new dressing, no changes. Daughter at bedside. VSS, on room air.

## 2023-04-17 NOTE — PLAN OF CARE
Problem: Hip Fracture Surgical Repair  Goal: Optimal Functional Ability  Outcome: Progressing  Intervention: Promote Optimal Functional Status  Recent Flowsheet Documentation  Taken 4/17/2023 1300 by Amanda Price, RN  Activity Management: up to bedside commode  Taken 4/17/2023 1230 by Amanda Price, RN  Activity Management:   up in chair   up ad danilo  Taken 4/17/2023 0830 by Amanda Price, RN  Activity Management:   standing at bedside   up in chair   dorsiflexion/plantar flexion performed     Advanced dementia. Difficulty following commands. Heavy 2 assist with walker.  Needs cues and reminders. Only able to take a few steps.  Daughter at bedside for support.  Painful with movement. Tramdol x 1 otherwise trying non-pharmalogical interventions.  Pt is not able to make her needs knows.

## 2023-04-17 NOTE — PROGRESS NOTES
[facility]    Medicine Progress Note - Hospitalist Service    Date of Admission:  4/15/2023    Assessment & Plan     Shanta Tello is a 82 year old female memory care resident with PMHx signficant for dementia, lower extremity edema, hypothyroidism, hyperlipidemia.  Presented after a unwitnessed fall at her facility.  Seen at the [facility] and diagnosed with a left comminuted proximal femur fracture involving the lesser trochanter.  CT head negative except for atrophy and severe cervical foraminal stenosis.  Transferred to [facility] with plans for [facility] to take for hip repair on 4/16.  Patient baseline nonverbal.  Daughter is contact.     The patient remains stable postop after hip repair, s/p cephalomedullary nailing of left subtrochanteric hip fracture on 4/16/23 by Dr. Arredondo with EBL of 50 ccs.  Blood pressure was trending low in the postanesthesia care unit but normalized. She is DNR, confirmed again 4/16. On 4/17/2023 will be rechecking electrolytes and Creatinine given elevation Cr to 1.12 on 4/15 and cbc postop (leukocytosis prior and postop hgb). BMP returned WNL but rechecking hgb given delta to 9.1.     Dispo: pending PT and OT and surgical clearance; SW following     - - - - -   Assessment and Plan: pt new to writer today 4/17     Unwitnessed fall  Left comminuted proximal femur fracture  S/p surgery as above in summary  Postsurgical care as per surgery  Pain control as per surgery  DVT prophylaxis as per surgery  -Therapies to see; appears disposition is TCU later in the day; appreciate social work helping w/ placement     Normocytic anemia  Assessment: The delta of admission hgb from 12.7 ---> 9.1 postoperatively is a little bit concerning, but no signs or symptoms conspicuous for bleed; no ecchymoses on exam.  The dressing at the incision site appears clean.  We will need to continue to monitor and potentially scan for hematoma or fluid  collection.  Plan:  -Monitor clinically for any signs or symptoms concerning for bleed  -Recheck hemoglobin this afternoon and again in the morning  -Transfuse if less than 7     Dementia  Per the daughter does have a history of some sundowning.   Order her home bedtime BuSpar and Aricept.  Order Zoloft.. -- appears stable on 4/17  Encephalopathy order set with Seroquel available as needed for agitation.  Close supervision by nursing staff.    Chronic lower extremity edema  Patient is chronically on Lasix.  We will hold at this time.  Verified with daughter that she does not have a history of heart failure on 4/16.  Appears euvolemic; stable on 4/l7.    Hypothyroidism  Order home levothyroxine.  Check TSH level, returned at 10.02; follow up as outpatient. For age-range stable.     Anticoagulation   Pneumatic Compression Devices       Diet: Advance Diet as Tolerated: Regular Diet Adult    DVT Prophylaxis: Defer to surgery -ASA 81 mg BID in addition to SCDs, lora stockings and early ambulation  Dotson Catheter: Not present  Lines: None     Cardiac Monitoring: None  Code Status: No CPR- Do NOT Intubate      Clinically Significant Risk Factors                         # Obesity: Estimated body mass index is 34.8 kg/m  as calculated from the following:    Height as of this encounter: 1.524 m (5').    Weight as of this encounter: 80.8 kg (178 lb 3.2 oz)., PRESENT ON ADMISSION         Disposition Plan      Expected Discharge Date: 04/18/2023                  Ender Urrutia MD  Hospitalist Service  [facility]  Securely message with [employer] (more info)  Text page via [employer] Paging/Directory   ______________________________________________________________________    Interval History   - no acute interval events  - no new pain or shortness of breath  - pt is at baseline mentation, answers questions occasionally, with nods     Physical Exam   Vital Signs: Temp: 97.8  F (36.6  C) Temp src: Oral BP: (!)  140/52 Pulse: 86   Resp: 18 SpO2: 92 % O2 Device: None (Room air) Oxygen Delivery: 2 LPM  Weight: 178 lbs 3.2 oz    General: alert, memory deficits conspicuous, and in no acute distress  Pulmonary: clear to auscultation bilaterally, normal respiratory effort, on room air, no rales or wheezes or evidence of accessory muscle use  CVS: regular rate and rhythm, no murmurs, rubs, or gallops; no blatant jugular venous distention; no extremity edema and extremities are warm to the touch  GI: soft, nontender, BS+, no rebound or guarding, no conspicuous organomegaly   Neuro: nonfocal, moves all extremities of own volition  Psych: appropriate  msk- surgical access points are dressed and clean/dry/intact    Medical Decision Making       45 MINUTES SPENT BY ME on the date of service doing chart review, history, exam, documentation & further activities per the note.      Data     I have personally reviewed the following data over the past 24 hrs:    10.1  \   9.8 (L)   / 172     138 107 22 /  115   4.3 23 0.83 \       Imaging results reviewed over the past 24 hrs:   No results found for this or any previous visit (from the past 24 hour(s)).

## 2023-04-17 NOTE — PLAN OF CARE
Occupational Therapy: Orders received. Chart reviewed and discussed with care team.? Occupational Therapy not indicated due to pt receives assist for all BADLs at [facility] and will receive transfer training for acute physical therapy.? Defer discharge recommendations to physical therapy.? Will complete orders.

## 2023-04-17 NOTE — PROGRESS NOTES
Orthopedic Progress Note      Assessment: 1 Day Post-Op  S/P Procedure(s):  INTERNAL FIXATION, FRACTURE, TROCHANTERIC, HIP, USING PINS OR RODS @    Plan:   - Continue PT/OT  - Weightbearing status: WBAT  - Anticoagulation: ASA 81 mg BID in addition to SCDs, lora stockings and early ambulation.  - Discharge planning: pending medical clearance, therapy progression, TCU placement      Subjective:  Pain: mild  Chest pain, SOB: no  Nausea, Vomiting:  no  Lightheadedness, Dizziness:  no  Neuro:  Patient denies new onset numbness or paresthesias    Patient is doing well postop day 1 from hip intramedullary nailing.  No new numbness tingling down the left leg.  Ambulating, voiding, eating well.  Hemoglobin 9.1 (pre-op 12.7).    Objective:  /62 (BP Location: Left arm)   Pulse 81   Temp 97.3  F (36.3  C) (Oral)   Resp 18   Ht 1.524 m (5')   Wt 80.8 kg (178 lb 3.2 oz)   SpO2 91%   BMI 34.80 kg/m    The patient is A&Ox3. Appears comfortable.   Sensation is intact.  Dorsiflexion and plantar flexion is intact.  Dorsalis pedis pulse intact.  Calves are soft and non-tender. Negative Saul's.  The incision is covered. Dressing C/D/I.  Of note patient did pull off the medial bandage the incision underneath this bandages look intact, clean and dry.  New bandage applied.    Pertinent Labs   Lab Results: personally reviewed.   Lab Results   Component Value Date    INR 0.98 04/15/2023     Lab Results   Component Value Date    WBC 10.1 04/17/2023    HGB 9.1 (L) 04/17/2023    HCT 27.0 (L) 04/17/2023    MCV 97 04/17/2023     04/17/2023     Lab Results   Component Value Date     04/17/2023    CO2 23 04/17/2023         Report completed by:  Gabrielle Arrieta PA-C/Dr. [facility]    Date: 4/17/2023  Time: 9:48 AM

## 2023-04-18 NOTE — PROGRESS NOTES
[facility]    Medicine Progress Note - Hospitalist Service    Date of Admission:  4/15/2023    Assessment & Plan     Shanta Tello is a 82 year old female memory care resident with PMHx signficant for dementia, lower extremity edema, hypothyroidism, hyperlipidemia.  Presented after a unwitnessed fall at her facility.  Seen at the [facility] ED and diagnosed with a left comminuted proximal femur fracture involving the lesser trochanter.  CT head negative except for atrophy and severe cervical foraminal stenosis.  Transferred to [facility] with plans for [facility] to take for hip repair on 4/16.  Patient baseline nonverbal.  Daughter is contact.     The patient remains stable postop after hip repair, s/p cephalomedullary nailing of left subtrochanteric hip fracture on 4/16/23 by Dr. Arredondo with EBL of 50 ccs.  Blood pressure was trending low in the postanesthesia care unit but normalized. She is DNR, confirmed again 4/16. On 4/17/2023 will be rechecking electrolytes and Creatinine given elevation Cr to 1.12 on 4/15 and cbc postop (leukocytosis prior and postop hgb). BMP returned WNL but rechecking hgb given delta to 9.1. Stable range --> 9.8--> 8.5 on 4/18/2023.     Overnight into 4/18/2023 with some delirium and acute behavioral disturbance; started on sitter and Psychiatry consulted.     Dispo: pending PT and OT and surgical clearance; SW following     - - - - -   Assessment and Plan: pt new to writer today 4/17     Unwitnessed fall  Left comminuted proximal femur fracture  S/p surgery as above in summary  Postsurgical care as per surgery  Pain control as per surgery  DVT prophylaxis as per surgery  -Therapies to see; appears disposition is TCU later in the day; appreciate social work helping w/ placement      Normocytic anemia  Assessment: The delta of admission hgb from 12.7 ---> 9.1 postoperatively is a little bit concerning, but no signs or symptoms conspicuous for bleed; no  ecchymoses on exam.  The dressing at the incision site appears clean.  We will need to continue to monitor and potentially scan for hematoma or fluid collection. Stable though downtrending at 8.5 on 4/18/2023  Plan:  -Monitor clinically for any signs or symptoms concerning for bleed  -Recheck hemoglobin this afternoon and again in the morning  -Transfuse if less than 7     Dementia; acute delirium 4/17 night  Per the daughter does have a history of some sundowning. On home bedtime BuSpar and Aricept and Zoloft.. Was stable until 4/17.  Is likely hyperactive type overnight/sundowning; does not appear infected.  Psychiatry consulted.  Plan:   -Encephalopathy order set with Seroquel available as needed for agitation.  -Close supervision by nursing staff.  -continue and wean sitter  - Psych consult     Chronic lower extremity edema  Patient is chronically on Lasix.  We will hold at this time.  Verified with daughter that she does not have a history of heart failure on 4/16.  Appears euvolemic; stable on 4/l7.    Hypothyroidism  Order home levothyroxine.  Check TSH level, returned at 10.02; follow up as outpatient. For age-range stable.     Anticoagulation   Pneumatic Compression Devices       Diet: Advance Diet as Tolerated: Regular Diet Adult  Diet    DVT Prophylaxis: Defer to surgery -ASA 81 mg BID in addition to SCDs, lora stockings and early ambulation  Dotson Catheter: Not present  Lines: None     Cardiac Monitoring: None  Code Status: No CPR- Do NOT Intubate      Clinically Significant Risk Factors               # Hypoalbuminemia: Lowest albumin = 3 g/dL at 4/18/2023  2:22 AM, will monitor as appropriate           # Obesity: Estimated body mass index is 34.8 kg/m  as calculated from the following:    Height as of this encounter: 1.524 m (5').    Weight as of this encounter: 80.8 kg (178 lb 3.2 oz)., PRESENT ON ADMISSION         Disposition Plan      Expected Discharge Date: 04/19/2023    Discharge Delays: 1:1 Sitter  still ordered - MD to assess    Discharge Comments: TCU Referral sent  4/17 put on 1:1 and soft restriants applied          Ender Urrutia MD  Hospitalist Service  [facility]  Securely message with Oximity (more info)  Text page via Flooved Paging/Directory   ______________________________________________________________________    Interval History   -as in summary, overnight delirium  - no new pain or shortness of breath  - pt is at baseline mentation again, resting in bed; sitter present  - explained care plans  - daughter present and concurs with our care plans  -discussed w/ SW     Physical Exam   Vital Signs: Temp: 97.8  F (36.6  C) Temp src: Axillary BP: 101/68 Pulse: 87   Resp: 18 SpO2: 93 % O2 Device: None (Room air)    Weight: 178 lbs 3.2 oz    General: alert, memory deficits conspicuous, and in no acute distress  Pulmonary: clear to auscultation bilaterally, normal respiratory effort, on room air, no rales or wheezes or evidence of accessory muscle use  CVS: regular rate and rhythm, no murmurs, rubs, or gallops; no blatant jugular venous distention; no extremity edema and extremities are warm to the touch  GI: soft, nontender, BS+, no rebound or guarding, no conspicuous organomegaly   Neuro: nonfocal, moves all extremities of own volition  Psych: appropriate  msk- surgical access points dressed and clean/dry/intact    Medical Decision Making       45 MINUTES SPENT BY ME on the date of service doing chart review, history, exam, documentation & further activities per the note.      Data     I have personally reviewed the following data over the past 24 hrs:    8.5  \   8.5 (L)   / 161     140 109 (H) 27 /  94   4.2 23 0.87 \       ALT: 14 AST: 28 AP: 58 TBILI: 0.6   ALB: 3.0 (L) TOT PROTEIN: 5.5 (L) LIPASE: N/A       Imaging results reviewed over the past 24 hrs:   No results found for this or any previous visit (from the past 24 hour(s)).

## 2023-04-18 NOTE — PLAN OF CARE
Goal Outcome Evaluation:      Problem: Plan of Care - These are the overarching goals to be used throughout the patient stay.    Goal: Plan of Care Review  Description: The Plan of Care Review/Shift note should be completed every shift.  The Outcome Evaluation is a brief statement about your assessment that the patient is improving, declining, or no change.  This information will be displayed automatically on your shift note.  Outcome: Progressing     Problem: Fall Injury Risk  Goal: Absence of Fall and Fall-Related Injury  Outcome: Progressing  Intervention: Identify and Manage Contributors    Patient appears to  be in pain via the advanced dementia pain scale, which is controlled with scheduled Tylenol. Patient takes pills crushed in applesauce.  At beginning of shift, pt was in soft soft wrist restraints.    Writer able to remove restraints at 0245 and remained off the rest of this shift.   1:1 sitter remains at  bedside.   Patient retaining urine, straight cath x 1 overnight.   Vitals stable.

## 2023-04-18 NOTE — CONSULTS
INITIAL PSYCHIATRIC CONSULTATION                  REASON FOR REQUEST: Agitation    ASSESSMENT/RECOMMENDATIONS/PLAN :   Metabolic encephalopathy likely in the context of postoperative delirium, narcotics medical condition, hospitalization, advanced age.,  Underlying cognitive deficits.  Cognitive and behavioral changes due to above.  Restlessness and agitation.  Sundowning  Sleep difficulties due to above.      Recommendations:  Patient's symptoms are suggestive of encephalopathy exacerbated in the context of postoperative status, medications and a change in her environment.  The hospital environment is contributing to behavioral dysregulation in conjunction with her current medical condition.  Continue to reassure, redirect and reorient frequently.  Judicious use of sedating medication.  Consider Seroquel 6.25 mg 3 times a day and 12.5 mg at bedtime.  Increase Aricept to 2.5 mg in the morning with breakfast and 10 mg with supper.  Rozerem 8 mg at bedtime.        MENTAL STATUS EXAMINATION:   Appearance: Stated age,  fluctuating levels of consciousness.  Fidgety  Speech: Devoid of content  Thought Process: confused  Thought content: Does not appear to respond to internally generated stimuli, no acute visual or auditory hallucination;     No manic symptoms, no paranoia no delusional thoughts.  Thought Formation:  loosening of association   Judgment: Impaired  Insight : Impaired  Attention : Distracted  Memory: Depressed  Fund Of Knowledge: Depressed  Affect: Neutral  Mood: Anxious  Alert and Oriented: Fluctuating. O x 1  Comprehension: Depressed   Generative thought content: Reduced  Language: Intact  Gait and Ambulation: With assist of one.  Problem solving: Impaired.   Cognitive set Shifting: Impaired     /80 (BP Location: Right leg)   Pulse 88   Temp 98.3  F (36.8  C) (Axillary)   Resp 16   Ht 1.524 m (5')   Wt 80.8 kg (178 lb 3.2 oz)   SpO2 93%   BMI 34.80 kg/m          HISTORY OF PRESENT ILLNESS:    Presenting history to include: Per [facility]/Specialists:   Shanta Tello is a 82 year old female memory care resident with PMH signficant for dementia, lower extremity edema, hypothyroidism, hyperlipidemia.  Presented after a unwitnessed fall at her facility.  Seen at the [facility] ED and diagnosed with a left comminuted proximal femur fracture involving the lesser trochanter.  CT head negative except for atrophy and severe cervical foraminal stenosis.  Transferred to [facility] with plans for [facility] to take for hip repair on 4/16.  Patient nonverbal at this time.  Daughter is present..  Normally at baseline patient is conversant.  Able to feed herself with some mild assistance.  Does get confused and is starting to not recognize family members.  Typically is up and ambulating quite a bit.  Patient appears uncomfortable but in no obvious acute distress.  Historical information provided by daughter and limited paper records from facility.  Questions answered to verbalize satisfaction.    Upon assessment patient was noted to be seated in bed eating her lunch.  She was cooperative and calm.  Disoriented to her hospital environment completely, she was not acknowledging nor recognizing her daughter at bedside.  She could not provide any meaningful information due to poor cognition, underlying dementia.  Collateral information was obtained from the daughter at bedside.  Patient is a resident of a memory care unit, usually functional with assistance from staff, needing around-the-clock monitoring and supervision.  She needs frequent redirection to complete tasks.  She has had some restlessness, pacing and distraction even at her memory care unit however she was redirectable back to her room or to task by staff there.  Since hospitalization her behaviors have escalated and restless.  She was noted to be pulling on the sheet, picking on things in the air after receiving pain medications.  She has had needed Seroquel,  olanzapine and Haldol to keep her calm and not agitated.  According to the daughter states that she is picking on things in the air after getting olanzapine or Seroquel but not sure.  Patient is needing pain medications, she is a status post left subtrochanteric hip fracture.    Review of Systems:As per HPI. Remainders of 12 point review of systems negative.  Psychiatric ROS:  Not able to provide any information due to hx of dementia     Total time:  80 minutes spent on chart and medication and labs review  pre-charting, face to face assessment, counseling and/or coordination of care.     PFSH reviewed  and not pertinent to chief complaint/reason for visit  /80 (BP Location: Right leg)   Pulse 88   Temp 98.3  F (36.8  C) (Axillary)   Resp 16   Ht 1.524 m (5')   Wt 80.8 kg (178 lb 3.2 oz)   SpO2 93%   BMI 34.80 kg/m    No results found for: AMPHET, PCP, BARBIT, OXYCODONE, THC, ETOH  @24HOURRESULTS@  Recent Results (from the past 72 hour(s))   Extra Blue Top Tube    Collection Time: 04/15/23  1:14 PM   Result Value Ref Range    Hold Specimen JIC    Extra Red Top Tube    Collection Time: 04/15/23  1:14 PM   Result Value Ref Range    Hold Specimen JIC    Extra Green Top (Lithium Heparin) Tube    Collection Time: 04/15/23  1:14 PM   Result Value Ref Range    Hold Specimen JIC    Extra Purple Top Tube    Collection Time: 04/15/23  1:14 PM   Result Value Ref Range    Hold Specimen JIC    Troponin T, High Sensitivity    Collection Time: 04/15/23  1:14 PM   Result Value Ref Range    Troponin T, High Sensitivity 24 (H) <=14 ng/L   Basic metabolic panel    Collection Time: 04/15/23  1:14 PM   Result Value Ref Range    Sodium 138 136 - 145 mmol/L    Potassium 4.0 3.4 - 5.3 mmol/L    Chloride 102 98 - 107 mmol/L    Carbon Dioxide (CO2) 25 22 - 29 mmol/L    Anion Gap 11 7 - 15 mmol/L    Urea Nitrogen 26.2 (H) 8.0 - 23.0 mg/dL    Creatinine 1.12 (H) 0.51 - 0.95 mg/dL    Calcium 9.2 8.8 - 10.2 mg/dL    Glucose 108 (H)  70 - 99 mg/dL    GFR Estimate 48 (L) >60 mL/min/1.73m2   INR    Collection Time: 04/15/23  1:14 PM   Result Value Ref Range    INR 0.98 0.85 - 1.15   Magnesium    Collection Time: 04/15/23  1:14 PM   Result Value Ref Range    Magnesium 1.9 1.7 - 2.3 mg/dL   CBC with platelets and differential    Collection Time: 04/15/23  1:14 PM   Result Value Ref Range    WBC Count 13.2 (H) 4.0 - 11.0 10e3/uL    RBC Count 3.91 3.80 - 5.20 10e6/uL    Hemoglobin 12.7 11.7 - 15.7 g/dL    Hematocrit 38.7 35.0 - 47.0 %    MCV 99 78 - 100 fL    MCH 32.5 26.5 - 33.0 pg    MCHC 32.8 31.5 - 36.5 g/dL    RDW 12.9 10.0 - 15.0 %    Platelet Count 218 150 - 450 10e3/uL    % Neutrophils 86 %    % Lymphocytes 7 %    % Monocytes 7 %    % Eosinophils 0 %    % Basophils 0 %    % Immature Granulocytes 0 %    NRBCs per 100 WBC 0 <1 /100    Absolute Neutrophils 11.3 (H) 1.6 - 8.3 10e3/uL    Absolute Lymphocytes 0.9 0.8 - 5.3 10e3/uL    Absolute Monocytes 0.9 0.0 - 1.3 10e3/uL    Absolute Eosinophils 0.1 0.0 - 0.7 10e3/uL    Absolute Basophils 0.0 0.0 - 0.2 10e3/uL    Absolute Immature Granulocytes 0.0 <=0.4 10e3/uL    Absolute NRBCs 0.0 10e3/uL   Adult Type and Screen    Collection Time: 04/15/23  1:14 PM   Result Value Ref Range    ABO/RH(D) O POS     Antibody Screen Negative Negative    SPECIMEN EXPIRATION DATE [id number]    UA with Microscopic reflex to Culture    Collection Time: 04/15/23  4:08 PM    Specimen: Urine, Midstream   Result Value Ref Range    Color Urine Light Yellow Colorless, Straw, Light Yellow, Yellow    Appearance Urine Clear Clear    Glucose Urine Negative Negative mg/dL    Bilirubin Urine Negative Negative    Ketones Urine Negative Negative mg/dL    Specific Gravity Urine 1.012 1.001 - 1.030    Blood Urine Negative Negative    pH Urine 5.5 5.0 - 7.0    Protein Albumin Urine Negative Negative mg/dL    Urobilinogen Urine <2.0 <2.0 mg/dL    Nitrite Urine Negative Negative    Leukocyte Esterase Urine 500 Jarrett/uL (A) Negative     RBC Urine 1 <=2 /HPF    WBC Urine 16 (H) <=5 /HPF    Squamous Epithelials Urine 1 <=1 /HPF   Urine Culture    Collection Time: 04/15/23  4:08 PM    Specimen: Urine, Midstream   Result Value Ref Range    Culture >100,000 CFU/mL Mixture of urogenital andrei    Troponin T, High Sensitivity (now)    Collection Time: 04/15/23  5:50 PM   Result Value Ref Range    Troponin T, High Sensitivity 23 (H) <=14 ng/L   Glucose by meter    Collection Time: 04/15/23  8:26 PM   Result Value Ref Range    GLUCOSE BY METER POCT 136 (H) 70 - 99 mg/dL   TSH    Collection Time: 04/16/23  9:09 AM   Result Value Ref Range    TSH 10.02 (H) 0.30 - 5.00 uIU/mL   Extra Purple Top Tube    Collection Time: 04/16/23  9:09 AM   Result Value Ref Range    Hold Specimen [facility]    Basic metabolic panel    Collection Time: 04/17/23  7:42 AM   Result Value Ref Range    Sodium 138 136 - 145 mmol/L    Potassium 4.3 3.5 - 5.0 mmol/L    Chloride 107 98 - 107 mmol/L    Carbon Dioxide (CO2) 23 22 - 31 mmol/L    Anion Gap 8 5 - 18 mmol/L    Urea Nitrogen 22 8 - 28 mg/dL    Creatinine 0.83 0.60 - 1.10 mg/dL    Calcium 8.8 8.5 - 10.5 mg/dL    Glucose 115 70 - 125 mg/dL    GFR Estimate 69 >60 mL/min/1.73m2   CBC with platelets    Collection Time: 04/17/23  7:42 AM   Result Value Ref Range    WBC Count 10.1 4.0 - 11.0 10e3/uL    RBC Count 2.79 (L) 3.80 - 5.20 10e6/uL    Hemoglobin 9.1 (L) 11.7 - 15.7 g/dL    Hematocrit 27.0 (L) 35.0 - 47.0 %    MCV 97 78 - 100 fL    MCH 32.6 26.5 - 33.0 pg    MCHC 33.7 31.5 - 36.5 g/dL    RDW 12.8 10.0 - 15.0 %    Platelet Count 172 150 - 450 10e3/uL   Hemoglobin    Collection Time: 04/17/23 12:52 PM   Result Value Ref Range    Hemoglobin 9.8 (L) 11.7 - 15.7 g/dL   UA with Microscopic reflex to Culture    Collection Time: 04/18/23  1:51 AM    Specimen: Urine, Catheter   Result Value Ref Range    Color Urine Light Yellow Colorless, Straw, Light Yellow, Yellow    Appearance Urine Clear Clear    Glucose Urine Negative Negative mg/dL     Bilirubin Urine Negative Negative    Ketones Urine Negative Negative mg/dL    Specific Gravity Urine 1.020 1.001 - 1.030    Blood Urine Negative Negative    pH Urine 5.5 5.0 - 7.0    Protein Albumin Urine Negative Negative mg/dL    Urobilinogen Urine <2.0 <2.0 mg/dL    Nitrite Urine Negative Negative    Leukocyte Esterase Urine Negative Negative    Mucus Urine Present (A) None Seen /LPF    RBC Urine 1 <=2 /HPF    WBC Urine 1 <=5 /HPF    Squamous Epithelials Urine <1 <=1 /HPF   Comprehensive metabolic panel    Collection Time: 04/18/23  2:22 AM   Result Value Ref Range    Sodium 140 136 - 145 mmol/L    Potassium 4.2 3.5 - 5.0 mmol/L    Chloride 109 (H) 98 - 107 mmol/L    Carbon Dioxide (CO2) 23 22 - 31 mmol/L    Anion Gap 8 5 - 18 mmol/L    Urea Nitrogen 27 8 - 28 mg/dL    Creatinine 0.87 0.60 - 1.10 mg/dL    Calcium 8.5 8.5 - 10.5 mg/dL    Glucose 94 70 - 125 mg/dL    Alkaline Phosphatase 58 45 - 120 U/L    AST 28 0 - 40 U/L    ALT 14 0 - 45 U/L    Protein Total 5.5 (L) 6.0 - 8.0 g/dL    Albumin 3.0 (L) 3.5 - 5.0 g/dL    Bilirubin Total 0.6 0.0 - 1.0 mg/dL    GFR Estimate 65 >60 mL/min/1.73m2   Magnesium    Collection Time: 04/18/23  2:22 AM   Result Value Ref Range    Magnesium 1.8 1.8 - 2.6 mg/dL   Troponin I    Collection Time: 04/18/23  2:22 AM   Result Value Ref Range    Troponin I 0.02 0.00 - 0.29 ng/mL   CBC with platelets and differential    Collection Time: 04/18/23  2:22 AM   Result Value Ref Range    WBC Count 8.5 4.0 - 11.0 10e3/uL    RBC Count 2.57 (L) 3.80 - 5.20 10e6/uL    Hemoglobin 8.5 (L) 11.7 - 15.7 g/dL    Hematocrit 25.0 (L) 35.0 - 47.0 %    MCV 97 78 - 100 fL    MCH 33.1 (H) 26.5 - 33.0 pg    MCHC 34.0 31.5 - 36.5 g/dL    RDW 13.2 10.0 - 15.0 %    Platelet Count 161 150 - 450 10e3/uL    % Neutrophils 58 %    % Lymphocytes 24 %    % Monocytes 16 %    % Eosinophils 1 %    % Basophils 0 %    % Immature Granulocytes 1 %    NRBCs per 100 WBC 0 <1 /100    Absolute Neutrophils 5.0 1.6 - 8.3 10e3/uL     Absolute Lymphocytes 2.1 0.8 - 5.3 10e3/uL    Absolute Monocytes 1.3 0.0 - 1.3 10e3/uL    Absolute Eosinophils 0.1 0.0 - 0.7 10e3/uL    Absolute Basophils 0.0 0.0 - 0.2 10e3/uL    Absolute Immature Granulocytes 0.0 <=0.4 10e3/uL    Absolute NRBCs 0.0 10e3/uL       PMH:   Past Medical History:   Diagnosis Date     Bilateral lower extremity edema      Dementia (H)      Hyperlipidemia      Hypothyroidism            Current Medications:Scheduled Meds:    acetaminophen  975 mg Oral Q8H     aspirin  81 mg Oral BID     busPIRone  7.5 mg Oral At Bedtime     donepezil  10 mg Oral At Bedtime     [Held by provider] furosemide  40 mg Oral Daily     levothyroxine  50 mcg Oral Daily     polyethylene glycol  17 g Oral Daily     senna-docusate  1 tablet Oral BID     sertraline  50 mg Oral Daily     sodium chloride (PF)  3 mL Intracatheter Q8H     sodium chloride (PF)  3 mL Intracatheter Q8H     Continuous Infusions:    lactated ringers 100 mL/hr at 04/16/23 1820     sodium chloride Stopped (04/16/23 1220)     PRN Meds:.[START ON 4/19/2023] acetaminophen, sore throat, bisacodyl, haloperidol lactate, HOLD MEDICATION, HYDROmorphone **OR** HYDROmorphone, lidocaine 4%, lidocaine 4%, lidocaine (buffered or not buffered), lidocaine (buffered or not buffered), magnesium hydroxide, melatonin, methocarbamol, naloxone **OR** naloxone **OR** naloxone **OR** naloxone, OLANZapine, ondansetron **OR** ondansetron, polyethylene glycol, prochlorperazine **OR** prochlorperazine, QUEtiapine, traMADol                Family History: PERSONALLY REVIEWED.  Family History   Problem Relation Age of Onset     Heart Failure Mother      Pertinent Family hx not pertinent to Chief Complaint or reason for visit.     Social History:  PERSONALLY REVIEWED.  Social History     Socioeconomic History     Marital status:      Spouse name: Not on file     Number of children: Not on file     Years of education: Not on file     Highest education level: Not on file    Occupational History     Not on file   Tobacco Use     Smoking status: Never     Smokeless tobacco: Not on file   Vaping Use     Vaping status: Not on file   Substance and Sexual Activity     Alcohol use: Not Currently     Drug use: Not on file     Sexual activity: Not on file   Other Topics Concern     Not on file   Social History Narrative     Not on file     Social Determinants of Health     Financial Resource Strain: Not on file   Food Insecurity: Not on file   Transportation Needs: Not on file   Physical Activity: Not on file   Stress: Not on file   Social Connections: Not on file   Intimate Partner Violence: Not on file   Housing Stability: Not on file    not pertinent to Chief Complaint or reason for visit.             Allergies as of 06/01/2014 Reviewed     Review of Systems:As per HPI. Remainders of 12 point review of systems negative.    Review of Pertinent Laboratory:      PERSONALLY REVIEWED.    Physical Exam: Temp:  [97.2  F (36.2  C)-98.5  F (36.9  C)] 98.3  F (36.8  C)  Pulse:  [80-88] 88  Resp:  [16-18] 16  BP: (100-122)/(58-80) 122/80  SpO2:  [92 %-93 %] 93 %   Vitals: reviewed in chart     Physical exam as per medical team: reviewed in chart      diagnoses, risk and benefits of medications discussed with staff. Care coordination with care management team.   Thank you for this consultation.       Deborah Byrd; NP  [facility]        This note was created with the help of Dragon dictation system. Grammatical and typing errors are not intentional.

## 2023-04-18 NOTE — PROGRESS NOTES
Remote MD request evaluation - agitation    Patient was calm and not restless on my visit - she already has received meds from prior - and also had been restrained on both wrists.She is awake and refused to answer questions. Did not appear in distress.    Assessment - History of dementia, in acute behavioral disturbance, Possibly delirium    Plans  1. Will get sitter and wean off from restraints  2. Check labs, UA   3. Consider psych eval    2A -- More calm, she is getting straight cath, at least 400 ml

## 2023-04-18 NOTE — PROGRESS NOTES
Orthopedic Progress Note      Assessment: 2 Day Post-Op  S/P Procedure(s):  LEFT INTERNAL FIXATION, FRACTURE, TROCHANTERIC, HIP, USING PINS OR RODS @    Plan:   - Continue PT/OT  - Weightbearing status: WBAT  - Anticoagulation: ASA 81 mg BID in addition to SCDs, lora stockings and early ambulation.  -Dressing: Patient likes to peel off bandages on her incision.  Also starting to pick at staples when bandages not on incisions.  Please ensure that there are Mepilex bandages over all staples, the middle bandage is the one that she has picked at the most there are 4 pieces of tape surrounding the bandage.  Please keep intact at all times  - Discharge planning: pending medical clearance, therapy progression, TCU placement      Subjective:  Pain: mild  Chest pain, SOB: no  Nausea, Vomiting:  no  Lightheadedness, Dizziness:  no  Neuro:  Patient denies new onset numbness or paresthesias    Patient is doing well postop day 2 from hip intramedullary nailing.  No new numbness tingling down the left leg.  Ambulating, voiding, eating well.  Hemoglobin 8.5 (pre-op 12.7).    Objective:  /68 (BP Location: Left arm)   Pulse 87   Temp 97.8  F (36.6  C) (Axillary)   Resp 18   Ht 1.524 m (5')   Wt 80.8 kg (178 lb 3.2 oz)   SpO2 93%   BMI 34.80 kg/m    The patient is A&Ox3. Appears comfortable.   Sensation is intact.  Dorsiflexion and plantar flexion is intact.  Dorsalis pedis pulse intact.  Calves are soft and non-tender. Negative Saul's.  The incision is covered. Dressing C/D/I.  Of note patient did pull off the medial bandage again the incision underneath this bandages look intact, clean and dry.  2 staples are in place under this bandage.  New bandage applied tape also reinforced around bandage.    Pertinent Labs   Lab Results: personally reviewed.   Lab Results   Component Value Date    INR 0.98 04/15/2023     Lab Results   Component Value Date    WBC 8.5 04/18/2023    HGB 8.5 (L) 04/18/2023    HCT 25.0 (L) 04/18/2023     MCV 97 04/18/2023     04/18/2023     Lab Results   Component Value Date     04/18/2023    CO2 23 04/18/2023         Report completed by:  Gabrielle Arrieta PA-C/Dr. Arredondo  [facility]    04/18/23

## 2023-04-18 NOTE — PROGRESS NOTES
"Patient vital signs are at baseline: Yes  Patient able to ambulate as they were prior to admission or with assist devices provided by therapies during their stay: unable to assess  Patient MUST void prior to discharge:  Yes  Patient able to tolerate oral intake:  Yes  Pain has adequate pain control using Oral analgesics:  No,  Reason:  Unable to assess pain level, other than when she states \"ouch\" when holding her knee or hip.  Does patient have an identified :  Yes   Daughter  Has goal D/C date and time been discussed with patient:  Yes      Patient uncooperative. Got very agitated and started pulling at IV and gown. Patient would not stop trying to get out of bed. Was with patient for over an hour trying to make sure she did not get up out of bed and to make sure she is safe. Got her back to bed after some PRN zyprexa (IM). She is still picking at everything and trying to fixate on her gown and any bandage. PIV was removed since there were no IV meds as well as so she would not harm herself.    Pt seems like she is retaining urine; bladder scan of 412 at 2130    NOEL Marcum attempted to straight cath patient at 2130, straight cath inserted but no return, patient too clenched and aggressive.     Spoke to daughter about restraints.    Pressure tape over mepilex that patient picked at.    Heavy assist of 2- used 3 people at times, pt will push away and try to take off gaitbelt.  "

## 2023-04-18 NOTE — PLAN OF CARE
Problem: Risk for Delirium  Goal: Improved Behavioral Control  4/18/2023 1514 by Amanda Price, RN  Outcome: Progressing  4/18/2023 1513 by Amanda Price RN  Outcome: Progressing  Intervention: Minimize Safety Risk  Recent Flowsheet Documentation  Taken 4/18/2023 0900 by Amanda Price, RN  Enhanced Safety Measures:    at bedside   family to remain at bedside   Pt unable to go to TCU today because need of 1:1 for safety. Pt off soft restraints since 0230 but still has 1:1 sitter for safety.  Pt is very drowsy, sleeping most of day in between cares.  Did eat with assistance, did ambulate 15 ft in the room with PT, currently sitting in chair. Daughter at bedside.  Pt can be combative with cares. Will trial off 1:1 this evening.        Problem: Fall Injury Risk  Goal: Absence of Fall and Fall-Related Injury  4/18/2023 1514 by Amanda Price, RN  Outcome: Progressing  4/18/2023 1513 by Amanda Price, RN  Outcome: Progressing  Intervention: Identify and Manage Contributors  Recent Flowsheet Documentation  Taken 4/18/2023 0900 by Amanda Price, RN  Medication Review/Management:   high-risk medications identified   medications reviewed  Intervention: Promote Injury-Free Environment  Recent Flowsheet Documentation  Taken 4/18/2023 1300 by Amanda Price, RN  Safety Promotion/Fall Prevention:   safety round/check completed   bedside attendant   clutter free environment maintained   nonskid shoes/slippers when out of bed  Taken 4/18/2023 0900 by Amanda Price, RN  Safety Promotion/Fall Prevention:   safety round/check completed   patient and family education   clutter free environment maintained   bedside attendant   Pt is very painful with movement.  Given tylenol and robaxin for pain.  Up with 2 assist and a walker.  Ice to left thigh.      Problem: Hip Fracture Surgical Repair  Goal: Effective Urinary Elimination  4/18/2023 1514 by Amanda Price, RN  Outcome: Progressing   Pt was straight cath'd  during the night.  No void this shift.  Pt did sit on the BSC once with no void or incontinence.  Pt has poor oral intake and has only drank 240ml all shift. Will bladder scan when pt gets back to bed.

## 2023-04-18 NOTE — SIGNIFICANT EVENT
Significant Event Note    Time of event: 9:54 PM April 17, 2023    Description of event:  Increased agitation  Hitting staff  Received zyprexa IM, seroquel po.   S/p Melatonin, robaxxin  Getting out of bed, pulling off her dressing  Hitting staff    Plan:  -Ordered haldol prn   -Ordered soft B wrist restraints (wean off as able )    Discussed with: bedside nurse    Benji Travis MD

## 2023-04-18 NOTE — PROGRESS NOTES
Care Management Follow Up    Length of Stay (days): 3    Expected Discharge Date: 04/19/2023     Concerns to be Addressed: discharge planning     Patient plan of care discussed at interdisciplinary rounds: Yes    Anticipated Discharge Disposition: Transitional Care     Anticipated Discharge Services: None  Anticipated Discharge DME: None    Patient/family educated on Medicare website which has current facility and service quality ratings:    Education Provided on the Discharge Plan:    Patient/Family in Agreement with the Plan: yes    Referrals Placed by CM/SW:    Private pay costs discussed: Not applicable    Additional Information:  7:55 AM  GENET informed pt was put on a 1:1 with restraints last night due to sundowning.  GENET left  for [facility] TCU requesting return call to discuss admit to facility.    8:05 AM  GENET spoke with Hyun at [facility].  She states will need to reassess in 24 hrs to see how pt is doing.  GENET canceled transport ride.  Bedside RN updated and she will update daughter Angela.      ABIGAIL SeguraSW

## 2023-04-19 NOTE — PROGRESS NOTES
Orthopedic Progress Note      Assessment: 2 Day Post-Op  S/P Procedure(s):  LEFT INTERNAL FIXATION, FRACTURE, TROCHANTERIC, HIP, USING PINS OR RODS     Plan:   - Continue PT/OT  - Weightbearing status: WBAT  - Anticoagulation: ASA 81 mg BID in addition to SCDs, lora stockings and early ambulation.  -Dressing: Patient likes to peel off bandages on her incision.  Also starting to pick at staples when bandages not on incisions.  Please ensure that there are Mepilex bandages over all staples, the middle bandage is the one that she has picked at the most there are 4 pieces of tape surrounding the bandage.  Please keep intact at all times  - Discharge planning: pending medical clearance, therapy progression, TCU scheduled for tomorrow      Subjective:  Pain: mild  Chest pain, SOB: no  Nausea, Vomiting:  no  Lightheadedness, Dizziness:  no  Neuro:  Patient denies new onset numbness or paresthesias    Patient is doing well postop day 3 from hip intramedullary nailing.  No new numbness tingling down the left leg.  Ambulating, voiding, eating well.  Hemoglobin 8.6 (pre-op 12.7).    Objective:  /61 (BP Location: Left arm)   Pulse 101   Temp 99.7  F (37.6  C) (Axillary)   Resp 18   Ht 1.524 m (5')   Wt 80.8 kg (178 lb 3.2 oz)   SpO2 91%   BMI 34.80 kg/m    The patient is A&Ox3. Appears comfortable.   Sensation is intact.  Dorsiflexion and plantar flexion is intact.  Dorsalis pedis pulse intact.  Calves are soft and non-tender. Negative Saul's.  The incision is covered. Dressing C/D/I.  Of note patient did pull off the medial bandage again the incision underneath this bandages look intact, clean and dry.  2 staples are in place under this bandage.  New bandage applied tape also reinforced around bandage.    Pertinent Labs   Lab Results: personally reviewed.   Lab Results   Component Value Date    INR 0.98 04/15/2023     Lab Results   Component Value Date    WBC 8.5 04/18/2023    HGB 8.6 (L) 04/19/2023    HCT 25.0  (L) 04/18/2023    MCV 97 04/18/2023     04/18/2023     Lab Results   Component Value Date     04/18/2023    CO2 23 04/18/2023         Report completed by:  Gabrielle Arrieta PA-C/Dr. Arredondo  [facility]    04/19/23      2.22

## 2023-04-19 NOTE — PLAN OF CARE
Problem: Pain Acute  Goal: Optimal Pain Control and Function  Outcome: Progressing  Intervention: Prevent or Manage Pain  Recent Flowsheet Documentation  Taken 4/19/2023 1245 by Jin Murray RN  Medication Review/Management: medications reviewed   Goal Outcome Evaluation:       Patient was sleeping most of the time, arousable with touch and conversation. Patient was able to get up, void in commode and is in recliner. Patient continues to be combative with cares. Daughter is with patient, able to redirect and make needs known.

## 2023-04-19 NOTE — PROGRESS NOTES
Psychiatric Progress Note  Metabolic encephalopathy likely in the context of postoperative delirium, narcotics medical condition, hospitalization, advanced age.,  Underlying cognitive deficits.  Cognitive and behavioral changes due to above.  Restlessness and agitation.  Sundowning  Sleep difficulties due to above.        Recommendations:  Patient's symptoms are suggestive of encephalopathy exacerbated in the context of postoperative status, medications and a change in her environment.  The hospital environment is contributing to behavioral dysregulation in conjunction with her current medical condition.  Continue to reassure, redirect and reorient frequently.  Judicious use of sedating medication.  Consider Seroquel 6.25 mg 3 times a day and 12.5 mg at bedtime.  Increase Aricept to 2.5 mg in the morning with breakfast and 10 mg with supper.  Rozerem 8 mg at bedtime.    SUBJECTIVE:  She had a good night, well rested. Some irritability with cares when she swats staff hands but allows cares. She is needing assist with ADLs. Able to feed self. Sleeping after breakfast. Daughter is at bedside.   No hallucinations, no psychosis.   MENTAL STATUS EXAMINATION:   Appearance: Sleepy. Comfortable.  Speech: Devoid of content  Thought Process: confused  Thought content: no acute visual or auditory hallucination;     No manic symptoms, no paranoia no delusional thoughts.  Thought Formation:  loosening of association   Judgment: Impaired  Insight : Impaired  Attention : Distracted  Memory: Depressed  Fund Of Knowledge: Depressed  Affect: Neutral  Mood: Anxious  Alert and Oriented: Fluctuating. O x 0  Comprehension: Depressed   Generative thought content: Reduced  Language: Intact  Gait and Ambulation: With assist of one.  Problem solving: Impaired.   Cognitive set Shifting: Impaired  Vital signs in last 24 hours  Temp:  [97.8  F (36.6  C)-99.7  F (37.6  C)] 99.7  F (37.6  C)  Pulse:  [] 101  Resp:  [17-18] 18  BP:  (118-136)/(58-62) 136/61  SpO2:  [91 %] 91 %

## 2023-04-19 NOTE — PLAN OF CARE
Goal Outcome Evaluation:  Patient rested comfortably this shift, no behaviors or impulsivity noted.   Every 2 hour turns while in bed.   Disoriented x4.   Mepis covering sensitive and open/incisional areas on body, all intact.   Vitally stable and room air.   Theodora Harrison RN

## 2023-04-19 NOTE — PROGRESS NOTES
[facility]    Medicine Progress Note - Hospitalist Service    Date of Admission:  4/15/2023    Assessment & Plan     Shanta Tello is a 82 year old female memory care resident with PMHx signficant for dementia, lower extremity edema, hypothyroidism, hyperlipidemia.  Presented after a unwitnessed fall at her facility.  Seen at the [facility] ED and diagnosed with a left comminuted proximal femur fracture involving the lesser trochanter.  CT head negative except for atrophy and severe cervical foraminal stenosis.  Transferred to [facility] with plans for [facility] to take for hip repair on 4/16.  Patient baseline nonverbal.  Daughter is primary contact.     The patient remains stable postop after hip repair, s/p cephalomedullary nailing of left subtrochanteric hip fracture on 4/16/23 by Dr. Arredondo with EBL of 50 ccs.  Blood pressure was trending low in the postanesthesia care unit but normalized. She is DNR, confirmed again 4/16. On 4/17/2023 will be rechecking electrolytes and Creatinine given elevation Cr to 1.12 on 4/15 and cbc postop (leukocytosis prior and postop hgb). BMP returned WNL but rechecking hgb given delta to 9.1. Stable range --> 9.8--> 8.5 on 4/18/2023.     Overnight into 4/18/2023 with some delirium and acute behavioral disturbance; started on sitter and Psychiatry consulted.  Psychology evaluated and regimen of Seroquel 6.25 mg 3 times daily and 12.5 mg at bedtime, and the Aricept was increased to 2.5 mg in the morning and 10 mg in the evening and started on  Rozerem 8mg at nighttime.     Disposition is to U, likely [place] -patient needs to be off of the one-to-one sitter for at least x24 hours, which will be 3 PM today.  Earliest discharge possibly 4/20 if no further issues.     - - - - -   Assessment and Plan:       Unwitnessed fall  Left comminuted proximal femur fracture  S/p surgery as above in summary  Postsurgical care as per surgery  Pain control as per  surgery  DVT prophylaxis as per surgery  -dispo as above    Normocytic anemia  Assessment: The delta of admission hgb from 12.7 ---> 9.1 postoperatively was a little bit concerning, but no signs or symptoms conspicuous for bleed; no ecchymoses on exam.  The dressing at the incision site remains clean.  We will need to continue to monitor and potentially scan for hematoma or fluid collection if hgb keeps dropping; it has fortunately remained Stable 9.1->9.8->8.5--> 8.6 on 4/19/2023   Plan:  -Monitor clinically for any signs or symptoms concerning for bleed  -Recheck hemoglobin this afternoon and again in the morning  -Transfuse if less than 7     Dementia; acute delirium 4/17 night, no resolved  Per the daughter does have a history of some sundowning. On home bedtime BuSpar and Aricept and Zoloft.. Was stable until 4/17 with delirium, likely hyperactive type overnight/sundowning; does not appear infected.  Psychiatry consulted. Regimen working. Off sitter starting 3pm 4/18.  Plan:   -Encephalopathy order set with Seroquel available as needed for agitation.  - Psych consult appreciated   - continue their regimen of Seroquel 6.25 mg 3 times daily and 12.5 mg at bedtime, and Aricept increased to 2.5 mg in the morning and 10 mg in the evening and Rozerem 8mg at nighttime    Chronic lower extremity edema  Patient is chronically on Lasix.  We will hold at this time.  Verified with daughter that she does not have a history of heart failure on 4/16.  Appears euvolemic; stable on 4/l7.  Could resume on discharge as po/fluid intake normalizing.    Hypothyroidism  Ordered home levothyroxine.  Check TSH level, returned at 10.02; follow up as outpatient.   For age-range is appropriate and stable.     Anticoagulation   Pneumatic Compression Devices   Postop, per surgery: -ASA 81 mg BID in addition to SCDs, lora stockings and early ambulation       Diet: Advance Diet as Tolerated: Regular Diet Adult  Diet  Snacks/Supplements Adult:  Magic Cup; Between Meals    DVT Prophylaxis: Defer to surgery -ASA 81 mg BID in addition to SCDs, lora stockings and early ambulation  Dotson Catheter: Not present  Lines: None     Cardiac Monitoring: None  Code Status: No CPR- Do NOT Intubate      Clinically Significant Risk Factors               # Hypoalbuminemia: Lowest albumin = 3 g/dL at 4/18/2023  2:22 AM, will monitor as appropriate           # Obesity: Estimated body mass index is 34.8 kg/m  as calculated from the following:    Height as of this encounter: 1.524 m (5').    Weight as of this encounter: 80.8 kg (178 lb 3.2 oz)., PRESENT ON ADMISSION         Disposition Plan      Expected Discharge Date: 04/20/2023    Discharge Delays: 1:1 Sitter still ordered - MD to assess    Discharge Comments: TCU Referral sent  4/17 put on 1:1 and soft restriants applied  4/18 at 1500 of 1:1          Ender Urrutia MD  Hospitalist Service  [facility]  Securely message with [employer] (more info)  Text page via [employer] Paging/Directory   ______________________________________________________________________    Interval History   -as in summary, overnight delirium on 4/17 into 4/18. Needs to be off sitter for 24 hours  - sitter discontinued successfully at 3pm on 4/18; as such needs to be reassessed by TCU 3pm onwards today and currently patient is calm and no issues  - no new pain or shortness of breath or questions  - pt is at baseline mentation again, resting in bed   - explained care plans to her again  -discussed w/ SW again    Physical Exam   Vital Signs: Temp: 99.7  F (37.6  C) Temp src: Axillary BP: 136/61 Pulse: 101   Resp: 18 SpO2: 91 % O2 Device: None (Room air)    Weight: 178 lbs 3.2 oz    General: alert, memory deficits conspicuous, and in no acute distress  Pulmonary: clear to auscultation bilaterally, normal respiratory effort, on room air, no rales or wheezes or evidence of accessory muscle use  CVS: regular rate and rhythm, no murmurs,  rubs, or gallops; no blatant jugular venous distention; no extremity edema and extremities are warm to the touch  GI: soft, nontender, BS+, no rebound or guarding, no conspicuous organomegaly   Neuro: nonfocal, moves all extremities of own volition  Psych: appropriate  msk- surgical access points dressed and clean/dry/intact; other limbs wnl     Medical Decision Making       48 MINUTES SPENT BY ME on the date of service doing chart review, history, exam, documentation & further activities per the note.      Data     I have personally reviewed the following data over the past 24 hrs:    N/A  \   8.6 (L)   / N/A     N/A N/A N/A /  N/A   N/A N/A N/A \       Imaging results reviewed over the past 24 hrs:   No results found for this or any previous visit (from the past 24 hour(s)).

## 2023-04-19 NOTE — PROGRESS NOTES
Care Management Follow Up    Length of Stay (days): 4    Expected Discharge Date: 04/20/2023     Concerns to be Addressed: discharge planning     Patient plan of care discussed at interdisciplinary rounds: Yes    Anticipated Discharge Disposition: Transitional Care     Anticipated Discharge Services: None  Anticipated Discharge DME: None    Patient/family educated on Medicare website which has current facility and service quality ratings:    Education Provided on the Discharge Plan:    Patient/Family in Agreement with the Plan: yes    Referrals Placed by CM/SW:    Private pay costs discussed: Not applicable    Additional Information:  8:29 AM  GENET spoke with Hyun in admissions at [facility].  Updated her that pt has been off the 1:1 since 3pm on 4/18.  She will review and get back to  around accepting pt to TCU tomorrow.    2:06 PM  GENET spoke with Hyun at [facility].  She confirmed pt can admit to TCU tomorrow.  GENET scheduled stretcher transport at 10:40-11:25am.  GENET updated TCU and bedside RN around discharge time.  PCS and PAS done.      HAROLDO Segura

## 2023-04-20 NOTE — PROGRESS NOTES
Physical Therapy Discharge Summary    Reason for therapy discharge:    Discharged to transitional care facility.    Progress towards therapy goal(s). See goals on Care Plan in [facility] electronic health record for goal details.  Goals partially met.  Barriers to achieving goals:   limited tolerance for therapy and discharge from facility.    Therapy recommendation(s):    Continued therapy is recommended.  Rationale/Recommendations:  TCU.

## 2023-04-20 NOTE — PROGRESS NOTES
Care Management Discharge Note    Discharge Date: 04/20/2023       Discharge Disposition: Transitional Care    Discharge Services: None    Discharge DME: None    Discharge Transportation: agency    Private pay costs discussed: Not applicable    PAS Confirmation Code: [id number] ([id number])  Patient/family educated on Medicare website which has current facility and service quality ratings:      Education Provided on the Discharge Plan:  Yes per care team  Persons Notified of Discharge Plans: Family, pt, staff, TCU  Patient/Family in Agreement with the Plan: yes    Handoff Referral Completed: Yes    Additional Information:  Chart reviewed. [facility]. PCS and PAS completed.     At baseline t resides at [facility] in [place], pt does not use DME for ambulation.  She is often nonverbal. Facility staff assists with I/ADL's - bathing, dressing, medication management, meals, laundry.  Angela manages pt's finances.        ZHOU Marina

## 2023-04-20 NOTE — DISCHARGE SUMMARY
[facility] MEDICINE  DISCHARGE SUMMARY     Primary Care Physician: Yamel Manzanares  Admission Date: 4/15/2023   Discharge Provider: Chelsey Carlson MD Discharge Date: 4/20/2023   Diet:   Active Diet and Nourishment Order   Procedures     Snacks/Supplements Adult: Magic Cup; Between Meals     Advance Diet as Tolerated: Regular Diet Adult     Diet       Code Status: No CPR- Do NOT Intubate   Activity: as tolerated; per ortho and TCU        Condition at Discharge: improved, stable     REASON FOR PRESENTATION(See Admission Note for Details)     Left hip fracture    PRINCIPAL & ACTIVE DISCHARGE DIAGNOSES     1.  Mechanical fall  2.  Closed left hip fracture  3.  Hypothyroidism  4.  Dementia  5.  Dyslipidemia  6.  Acute metabolic encephalopathy due to post operative state  7.  Anemia due to chronic disease      PENDING LABS     Unresulted Labs Ordered in the Past 30 Days of this Admission     No orders found from 3/16/2023 to 4/16/2023.            PROCEDURES ( this hospitalization only)      Procedure(s):  LEFT INTERNAL FIXATION, FRACTURE, TROCHANTERIC, HIP, USING PINS OR RODS    RECOMMENDATIONS TO OUTPATIENT PROVIDER FOR F/U VISIT     Follow-up Appointments     Follow Up Care      Please follow-up with Dr. Arredondo's team in 2 weeks at [facility]. Call our scheduling line at [phone] to make an   appointment, if you do not already have one scheduled.             DISPOSITION     Skilled Nursing Facility    SUMMARY OF HOSPITAL COURSE:      82 year old female from a local memory care unit into [facility] on 4/15/2023 after  Presenting for a mechanical fall.  Pt sustained a left hip fracture; closed, comminuted of the  Proximal femur.  She was admitted; given supportive care then had consultation with the  Orthopedic service. She was taken for operative repair on the 17th.  Post operatively she  Had some issues with metabolic encephalopathy though this cleared with  medication  And support. She is on twice daily baby aspirin and compression stockings for dvt prophylaxis.  After discussion with the case management team it was decided the patient will be  Transferred to a local TCU for further strengthening and care.  Daughter has been involved  And agrees with cares. She had an otherwise uncomplicated hospital course.         Discharge Medications with Med changes:     Current Discharge Medication List      START taking these medications    Details   aspirin (ASA) 81 MG EC tablet Take 1 tablet (81 mg) by mouth 2 times daily  Qty: 60 tablet, Refills: 0    Associated Diagnoses: Closed fracture of left hip, initial encounter (H)      tiZANidine (ZANAFLEX) 4 MG tablet Take 1 tablet (4 mg) by mouth 3 times daily as needed for muscle spasms  Qty: 30 tablet, Refills: 0    Associated Diagnoses: Closed fracture of left hip, initial encounter (H)      traMADol (ULTRAM) 50 MG tablet Take 1 tablet (50 mg) by mouth every 6 hours as needed for moderate pain  Qty: 30 tablet, Refills: 0    Associated Diagnoses: Closed fracture of left hip, initial encounter (H)         CONTINUE these medications which have CHANGED    Details   acetaminophen (TYLENOL) 325 MG tablet Take 2 tablets (650 mg) by mouth every 4 hours as needed for other (For optimal non-opioid multimodal pain management to improve pain control.)  Qty: 100 tablet, Refills: 0    Associated Diagnoses: Closed fracture of left hip, initial encounter (H)         CONTINUE these medications which have NOT CHANGED    Details   busPIRone (BUSPAR) 7.5 MG tablet Take 7.5 mg by mouth At Bedtime      calcium carbonate-vitamin D (OSCAL) 500-5 MG-MCG tablet Take 1 tablet by mouth daily      donepezil (ARICEPT) 10 MG tablet Take 10 mg by mouth At Bedtime      furosemide (LASIX) 40 MG tablet Take 40 mg by mouth daily      levothyroxine (SYNTHROID/LEVOTHROID) 50 MCG tablet Take 50 mcg by mouth daily      meclizine (ANTIVERT) 25 MG tablet Take 25 mg by  mouth 3 times daily as needed for dizziness      multivitamin, therapeutic (THERA-VIT) TABS tablet Take 1 tablet by mouth daily      nystatin (MYCOSTATIN) 222469 UNIT/GM external powder Apply topically 2 times daily Wash and dry area under breasts before applying powder      ondansetron (ZOFRAN ODT) 4 MG disintegrating tablet [ONDANSETRON (ZOFRAN ODT) 4 MG DISINTEGRATING TABLET] Take 1 tablet (4 mg total) by mouth every 8 (eight) hours as needed for nausea.  Qty: 30 tablet, Refills: 0      sertraline (ZOLOFT) 50 MG tablet Take 50 mg by mouth daily             Consults       PHARMACY IP CONSULT  ORTHOPEDIC SURGERY IP CONSULT  CARE MANAGEMENT / SOCIAL WORK IP CONSULT  PHARMACY IP CONSULT  CARE MANAGEMENT / SOCIAL WORK IP CONSULT  PHYSICAL THERAPY ADULT IP CONSULT  OCCUPATIONAL THERAPY ADULT IP CONSULT  PHYSICAL THERAPY ADULT IP CONSULT  OCCUPATIONAL THERAPY ADULT IP CONSULT  PSYCHIATRY IP CONSULT    Immunizations given this encounter       There is no immunization history on file for this patient.        SIGNIFICANT IMAGING FINDINGS     No results found for this visit on 04/15/23.    SIGNIFICANT LABORATORY FINDINGS     Most Recent 3 BMP's:Recent Labs   Lab Test 04/18/23  0222 04/17/23  0742 04/15/23  2026 04/15/23  1314    138  --  138   POTASSIUM 4.2 4.3  --  4.0   CHLORIDE 109* 107  --  102   CO2 23 23  --  25   BUN 27 22  --  26.2*   CR 0.87 0.83  --  1.12*   ANIONGAP 8 8  --  11   TIM 8.5 8.8  --  9.2   GLC 94 115 136* 108*           Discharge Orders        General info for SNF    Length of Stay Estimate: Short Term Care: Estimated # of Days <30 Condition at Discharge: Improving Level of care:skilled  Rehabilitation Potential: Excellent Admission H&P remains valid and up-to-date: Yes Recent Chemotherapy: N/A Use Nursing Home Standing Orders: Yes     Mantoux Instructions    Give two-step Mantoux (PPD) Per Facility Policy {[id number]     Incentive Spirometry    Incentive Spirometry 10 times per hour, 4 times per  "day.     Reason for your hospital stay    Hip fracture     When to call - Contact Surgeon Team    You may experience symptoms that require follow-up before your scheduled appointment. Refer to the \"Stoplight Tool\" for instructions on when to contact your Surgeon Team if you are concerned about pain control, blood clots, constipation, or if you are unable to urinate.     When to call - Reach out to Urgent Care    If you are not able to reach your Surgeon Team and you need immediate care, go to the Orthopedic Walk-in Clinic or Urgent Care at your Surgeon's office.  Do NOT go to the Emergency Room unless you have shortness of breath, chest pain, or other signs of a medical emergency.     When to call - Reasons to Call 911    Call 911 immediately if you experience sudden-onset chest pain, arm weakness/numbness, slurred speech, or shortness of breath     Symptoms - Fever Management    A low grade fever can be expected after surgery.  Use acetaminophen (TYLENOL) as needed for fever management.  Contact your Surgeon Team if you have a fever greater than 101.5 F, chills, and/or night sweats.     Symptoms - Constipation management    Constipation (hard, dry bowel movements) is expected after surgery due to the combination of being less active, the anesthetic, and the opioid pain medication.  You can do the following to help reduce constipation:  ~  FLUIDS:  Drink clear liquids (water or Gatorade), or juice (apple/prune).  ~  DIET:  Eat a fiber rich diet.    ~  ACTIVITY:  Get up and move around several times a day.  Increase your activity as you are able.  MEDICATIONS:  Reduce the risk of constipation by starting medications before you are constipated.  You can take Miralax   (1 packet as directed) and/or a stool softener (Senokot 1-2 tablets 1-2 times a day).  If you already have constipation and these medications are not working, you can get magnesium citrate and use as directed.  If you continue to have constipation you " can try an over the counter suppository or enema.  Call your Surgeon Team if it has been greater than 3 days since your last bowel movement.     Symptoms - Reduced Urine Output    Changes in the amount of fluids you drank before and after surgery may result in problems urinating.  It is important to stay well-hydrated after surgery and drink plenty of water. If it has been greater than 8 hours since you have urinated despite drinking plenty of water, call your Surgeon Team.     Activity - Exercises to prevent blood clots    Unless otherwise directed by your Surgeon team, perform the following exercises at least three times per day for the first four weeks after surgery to prevent blood clots in your legs: 1) Point and flex your feet (Ankle Pumps), 2) Move your ankle around in big circles, 3) Wiggle your toes, 4) Walk, even for short distances, several times a day, will help decrease the risk of blood clots.     Comfort and Pain Management - Pain after Surgery    Pain after surgery is normal and expected.  You will have some amount of pain for several weeks after surgery.  Your pain will improve with time.  There are several things you can do to help reduce your pain including: rest, ice, elevation, and using pain medications as needed. Contact your Surgeon Team if you have pain that persists or worsens after surgery despite rest, ice, elevation, and taking your medication(s) as prescribed. Contact your Surgeon Team if you have new numbness, tingling, or weakness in your operative extremity.     Comfort and Pain Management - Swelling after Surgery    Swelling and/or bruising of the surgical extremity is common and may persist for several months after surgery. In addition to frequent icing and elevation, gentle compressive support with an ACE wrap or tubigrip may help with swelling. Apply compression regularly, removing at least twice daily to perform skin checks. Contact your Surgeon Team if your swelling increases  and is NOT associated with an increase in your activity level, or if your swelling increases and is associated with redness and pain.     Comfort and Pain Management - Cold therapy    Ice can be used to control swelling and discomfort after surgery. Place a thin towel over your operative site and apply the ice pack overtop. Leave ice pack in place for 20 minutes, then remove for 20 minutes. Repeat this 20 minutes on/20 minutes off routine as often as tolerated.     Medication Instructions - Acetaminophen (TYLENOL) Instructions    You were discharged with acetaminophen (TYLENOL) for pain management after surgery. Acetaminophen most effectively manages pain symptoms when it is taken on a schedule without missing doses (every four, six, or eight hours). Your Provider will prescribe a safe daily dose between 3000 - 4000 mg.  Do NOT exceed this daily dose. Most patients use acetaminophen for pain control for the first four weeks after surgery.  You can wean from this medication as your pain decreases.     Medication Instructions - NSAID Instructions    You were discharged with an anti-inflammatory medication for pain management to use in combination with acetaminophen (TYLENOL) and the narcotic pain medication.  Take this medication exactly as directed.  You should only take one anti-inflammatory at a time.  Some common anti-inflammatories include: ibuprofen (ADVIL, MOTRIN), naproxen (ALEVE, NAPROSYN), celecoxib (CELEBREX), meloxicam (MOBIC), ketorolac (TORADOL).  Take this medication with food and water.     Medication Instructions - Opioids - Tapering Instructions    In the first three days following surgery, your symptoms may warrant use of the narcotic pain medication every four to six hours as prescribed. This is normal. As your pain symptoms improve, focus your efforts on decreasing (tapering) use of narcotic medications. The most successful tapering strategy is to first, decrease the number of tablets you take  "every 4-6 hours to the minimum prescribed. Then, increase the amount of time between doses.  For example:  First, taper to   or 1 tablet every 4-6 hours.  Then, taper to   or 1 tablet every 6-8 hours.  Then, taper to   or 1 tablet every 8-10 hours.  Then, taper to   or 1 tablet every 10-12 hours.  Then, taper to   or 1 tablet at bedtime.  The bedtime dose can help with comfort during sleep and is typically the last dose to be discontinued after surgery.     Follow Up Care    Please follow-up with Dr. Arredondo's team in 2 weeks at [facility]. Call our scheduling line at [phone] to make an appointment, if you do not already have one scheduled.     Shower with wound/dressing covered    You must COVER your dressing or incision with saran wrap (or any other non-permeable covering) to allow the incision to remain dry while showering.  You may shower 3 days after surgery as long as the surgical wound stays dry. Continue to cover your dressing or incision for showering until your first office visit.  You are strictly prohibited from soaking   or submerging the surgical wound underwater.     Medication instructions -  Anticoagulation - aspirin    Take the aspirin as prescribed for a total of four weeks after surgery.  This is given to help minimize your risk of blood clot.     Comfort and Pain Management - LOWER Extremity Elevation    Swelling is expected for several months after surgery. This type of swelling is usually associated with gravity and activity, and can be improved with elevation.   The best way to do this is to get your \"toes above your nose\" by laying down and placing several pillows lengthwise under your calf and heel. When elevating your leg keep your knee completely straight. Perform this elevation as often as possible especially for the first two weeks after surgery.     Medication Instructions - Opioid Instructions (Less than 65 years)    You were discharged with an opioid medication " (hydromorphone, oxycodone, hydrocodone, or tramadol). This medication should only be taken for breakthrough pain that is not controlled with acetaminophen (TYLENOL). If you rate your pain less than 3 you do not need this medication.  Pain rating 0-3:  You do not need this medication.  Pain rating 4-6:  Take 1 tablet every 4-6 hours as needed  Pain rating 7-10:  Take 2 tablets every 4-6 hours as needed.  Do not exceed 6 tablets per day     Physical Therapy Adult Consult    Evaluate and treat as clinically indicated.    Reason: Status Post Hip Surgery     Occupational Therapy Adult Consult    Evaluate and treat as clinically indicated.    Reason: Status Post Hip Surgery     Crutches DME    DME Documentation: Describe the reason for need to support medical necessity: Impaired gait status post hip surgery. I, the undersigned, certify that the above prescribed supplies are medically necessary for this patient and is both reasonable and necessary in reference to accepted standards of medical practice in the treatment of this patient's condition and is not prescribed as a convenience.     Cane DME    DME Documentation: Describe the reason for need to support medical necessity: Impaired gait status post hip surgery. I, the undersigned, certify that the above prescribed supplies are medically necessary for this patient and is both reasonable and necessary in reference to accepted standards of medical practice in the treatment of this patient's condition and is not prescribed as a convenience.     Walker DME    : DME Documentation: Describe the reason for need to support medical necessity: Impaired gait status post hip surgery. I, the undersigned, certify that the above prescribed supplies are medically necessary for this patient and is both reasonable and necessary in reference to accepted standards of medical practice in the treatment of this patient's condition and is not prescribed as a convenience.     Diet    Follow  this diet upon discharge: Orders Placed This Encounter      Advance Diet as Tolerated: Regular Diet Adult       Examination   Physical Exam   Temp:  [98.3  F (36.8  C)-98.9  F (37.2  C)] 98.9  F (37.2  C)  Pulse:  [] 94  Resp:  [18-20] 18  BP: (117-148)/(55-83) 138/69  SpO2:  [91 %-96 %] 96 %  Wt Readings from Last 1 Encounters:   04/16/23 80.8 kg (178 lb 3.2 oz)       General Appearance: Alert,   Respiratory: clear bilaterally  Cardiovascular: regular  GI: soft NDNT, +BS  Skin: no rashes; incision site on left hip is clean         Please see EMR for more detailed significant labs, imaging, consultant notes etc.    IChelsey MD, personally saw the patient today and spent less than or equal to 30 minutes discharging this patient.    Chelsey Carlson MD  [facility]    CC:Yamel Manzanares

## 2023-04-20 NOTE — SIGNIFICANT EVENT
Significant Event Note  Remote Coverage MD    Time of event: 6:56 PM April 20, 2023    Description of event:  Paged: Discharge med rec not complete. Patient already discharged and physically at TCU.     Plan:  -Discharge Meds reconciled. Rozerem prescribed (seems to not have been prescribed earlier)   -Despite reconciliation through navigator done, red bar appears as if med reconciliation not done.   -RN to fax med reconciliation as is    Discussed with:  NOEL Simon MD

## 2023-04-20 NOTE — PROGRESS NOTES
Goal outcome evaluation:    VSS and afebrile. No complaints of pain and no non-verbal signs of pain. Would not arouse for HS medications at scheduled time so they were given late when patient was more alert. Mepilex x3 to right outer thigh are in place and CDI.

## 2023-04-20 NOTE — PLAN OF CARE
Goal Outcome Evaluation:  Patient resting well.  Incontinent episode of BM this shift.   Q2h turns, vitally stable. Room air.  Theodora Harrison RN

## 2023-04-20 NOTE — PROGRESS NOTES
Orthopedic Progress Note      Assessment: 4 Day Post-Op  S/P Procedure(s):  LEFT INTERNAL FIXATION, FRACTURE, TROCHANTERIC, HIP, USING PINS OR RODS     Plan:   - Continue PT/OT  - Weightbearing status: WBAT  - Anticoagulation: ASA 81 mg BID in addition to SCDs, lora stockings and early ambulation.  -Dressing: Patient likes to peel off bandages on her incision.  Also starting to pick at staples when bandages not on incisions.  Please ensure that there are Mepilex bandages over all staples, the middle bandage is the one that she has picked at the most there are 4 pieces of tape surrounding the bandage.  Please keep intact at all times  - Discharge planning: pending medical clearance, therapy progression, TCU scheduled for tomorrow      Subjective:  Pain: mild  Chest pain, SOB: no  Nausea, Vomiting:  no  Lightheadedness, Dizziness:  no  Neuro:  Patient denies new onset numbness or paresthesias    Patient is doing well postop day 4 from hip intramedullary nailing.  No new numbness tingling down the left leg.  Ambulating, voiding, eating well.  Hemoglobin 8.6 (pre-op 12.7).    Objective:  /69 (BP Location: Left arm)   Pulse 94   Temp 98.9  F (37.2  C) (Axillary)   Resp 18   Ht 1.524 m (5')   Wt 80.8 kg (178 lb 3.2 oz)   SpO2 96%   BMI 34.80 kg/m    The patient is A&Ox3. Appears comfortable.   Sensation is intact.  Dorsiflexion and plantar flexion is intact.  Dorsalis pedis pulse intact.  Calves are soft and non-tender. Negative Saul's.  The incision is covered. Dressing C/D/I.  Of note patient did pull off the medial bandage again the incision underneath this bandages look intact, clean and dry.  2 staples are in place under this bandage.  New bandage applied tape also reinforced around bandage.    Pertinent Labs   Lab Results: personally reviewed.   Lab Results   Component Value Date    INR 0.98 04/15/2023     Lab Results   Component Value Date    WBC 8.5 04/18/2023    HGB 8.6 (L) 04/19/2023    HCT 25.0 (L)  04/18/2023    MCV 97 04/18/2023     04/18/2023     Lab Results   Component Value Date     04/18/2023    CO2 23 04/18/2023         Report completed by:  Gabrielle Arrieta PA-C/Dr. Arredondo  [facility]    04/20/23

## 2023-04-20 NOTE — PLAN OF CARE
"Goal Outcome Evaluation:    Problem: Plan of Care - These are the overarching goals to be used throughout the patient stay.    Goal: Patient-Specific Goal (Individualized)  Description: You can add care plan individualizations to a care plan. Examples of Individualization might be:  \"Parent requests to be called daily at 9am for status\", \"I have a hard time hearing out of my right ear\", or \"Do not touch me to wake me up as it startles me\".  Outcome: Adequate for Care Transition        Patient discharged to [facility] via stretcher. Daughter given AVS. Answered questions. Daughter stated understanding of discharge instructions.                  "

## 2023-04-21 NOTE — PROGRESS NOTES
[facility]    Background: Transitional Care Management program identified per system criteria and reviewed by [facility] team for possible outreach.    Assessment: Upon chart review, CCRC Team member will not proceed with patient outreach related to this episode of Transitional Care Management program due to reason below:    Non-[facility] TCU: CCRC team member noted patient discharged to TCU/ARU/LTACH. Patient is not established with a [facility] currently supported by Primary Care-Care Coordination therefore handoff to Primary Care-Care Coordination is not appropriate at this time.    Plan: Transitional Care Management episode addressed appropriately per reason noted above.      Jessa Campbell RN  [facility], [facility]    *Connected Care Resource Team does NOT follow patient ongoing. Referrals are identified based on internal discharge reports and the outreach is to ensure patient has an understanding of their discharge instructions.

## 2023-04-24 PROBLEM — F03.90 DEMENTIA (H): Chronic | Status: RESOLVED | Noted: 2023-01-01 | Resolved: 2023-01-01

## 2023-04-24 NOTE — LETTER
4/24/2023        RE: Shanta Tello  [facility]  [address]  [place] [address]        [facility] GERIATRICS    PRIMARY CARE PROVIDER AND CLINIC:  Yamel Manzanares NP, [address] [address] / [facility] [address]  Chief Complaint   Patient presents with     BHARATHIECK      [place] Medical Record Number:  [id number]  Place of Service where encounter took place:  [facility] (U/SNF) [4000]    Shanta Tello  is a 82 year old female (1940), admitted to the above facility from  [facility]. Hospital stay 4/15/23 through 4/20/23..   HPI:    Shanta has a past medical history of dementia and lives in memory care.  She also has past medical history significant for hypothyroid, anxiety/depression, LE edema..  S/he was admitted to the hospital after a mechanical fall at her [facility] care assisted living facility and found to have closed, comminuted fracture of the proximal L femur.  She underwent ORIF on 4/17/23. The hospital stay was complicated with ABL anemia 12.7 -->8.6, delirium on known history of severe dementia.     Follow up needs:   -[facility], Dr. Arredondo, in about 2 weeks   -continue aspirin 81 mg po BID x 30 days for DVT prophylaxis S/P surgery    Today's concern is:   Shanta is somnolent and unable to answer simple questions today.  She is responsive to stimuli but lying in bed with eyes closed.  Nursing and therapy team report she is combative with cares and hits out at staff.  She is observed to yell with cares.  She does sleep peacefully at night, per nursing report.    Daughter, Angela, who is involved in cares reports that Shanta was ambulatory about her assisted living facility without device prior to this, good appetite but was able to toilet independently.  She was noted to occasionally have behaviors with cares, but overall manageable.  Per Angela, she was started on quetiapine, ramelteon, tizanidine at hospital.  She is  [address] [facility] 
[phone] Patient: Asim Hernandez MRN: [id number] TSH:5/35/2782 Visit Information Date & Time Provider Department Dept. Phone Encounter #  
 2/22/2018  3:00 PM Mary Leone NP [address] [facility] [phone] [id number] Follow-up Instructions Return in about 8 weeks (around 4/19/2018). Your Appointments 4/17/2018  3:00 PM  
ESTABLISHED PATIENT with Hamzah Bailey NP  
[address] [facility]) Appt Note: 8 week f/u  
 [facility] [address] [address] [address] [address] [address] [facility] Upcoming Health Maintenance Date Due DTaP/Tdap/Td series (2 - Tdap) 4/16/2010 HPV AGE 9Y-26Y (2 of 3 - Female 3 Dose Series) 4/5/2016 Influenza Age 5 to Adult 8/1/2017 Allergies as of 2/22/2018  Review Complete On: 2/22/2018 By: Hamzah Bailey NP Severity Noted Reaction Type Reactions Peanut High 07/14/2017    Anaphylaxis Current Immunizations  Never Reviewed Name Date DTaP-Hep B-IPV 4/15/1999, 1/20/1999, 1998 HPV 2/9/2016 Hep A Vaccine 2/9/2016, 11/7/2006 Hib 9/30/1999, 4/15/1999, 1/20/1999, 1998 IPV 8/26/2003, 9/30/1999, 4/15/1999, 1/20/1999, 1998 MMR 8/26/2003, 1/21/2000 Meningococcal (MCV4) Vaccine 2/9/2016, 9/22/2010 Td 3/19/2010 Tdap 8/26/2003, 9/30/1999, 4/15/1999, 1/20/1999, 1998 Varicella Virus Vaccine 9/22/2010, 1/21/2000 Not reviewed this visit You Were Diagnosed With   
  
 Codes Comments Severe episode of recurrent major depressive disorder, without psychotic features ([facility] [address]    -  Primary ICD-10-CM: F33.2 ICD-9-CM: 296.33 with mixed features Anxiety     ICD-10-CM: F41.9 ICD-9-CM: 300.00 Vitals BP Pulse Height(growth percentile) Weight(growth percentile) BMI OB Status 104/74 (36 %/ 84 %)* 85 5' 2\" (1.575 m) (18 %, Z= -0.90) 110 lb (49.9 kg) (16 %, Z= -1.00) 20.12 kg/m2 (30 %, Z= -0.53) Having regular periods Smoking Status Never Smoker *BP percentiles are based on NHBPEP's 4th Report Growth percentiles are based on [facility] 2-20 Years data. BMI and BSA Data Body Mass Index Body Surface Area  
 20.12 kg/m 2 1.48 m 2 Preferred Pharmacy Pharmacy Name Phone [address] [place] [address] [phone] Your Updated Medication List  
  
   
This list is accurate as of 2/22/18  3:33 PM.  Always use your most recent med list.  
  
  
  
  
 ARIPiprazole 5 mg tablet Commonly known as:  ABILIFY Take 1 Tab by mouth daily. norgestimate-ethinyl estradiol 0.18/0.215/0.25 mg-35 mcg (28) Tab Commonly known as:  ORTHO TRI-CYCLEN, TRI-SPRINTEC Take 1 Tab by mouth daily. Indications: DYSMENORRHEA  
  
 simethicone 125 mg capsule Commonly known as:  GAS-X Take 125 mg by mouth four (4) times daily as needed for Flatulence. vortioxetine 10 mg tablet Commonly known as:  TRINTELLIX Take 1 Tab by mouth daily. Prescriptions Sent to Pharmacy Refills ARIPiprazole (ABILIFY) 5 mg tablet 1 Sig: Take 1 Tab by mouth daily. Class: Normal  
 Pharmacy: [place] [address] Ph #: [phone] Route: Oral  
 vortioxetine (TRINTELLIX) 10 mg tablet 1 Sig: Take 1 Tab by mouth daily. Class: Normal  
 Pharmacy: [place] [address] Ph #: [phone] Route: Oral  
  
Follow-up Instructions Return in about 8 weeks (around 4/19/2018). Introducing [facility]!    
 Bev Couch introduces wutabout patient portal. Now you can access parts worries as Shanta is quite sedated today. She also reports she has rarely used meclizine since moving to assisted living facility.     BP Readings from Last 3 Encounters:   04/24/23 114/59   04/20/23 138/69   04/15/23 (!) 154/97      Wt Readings from Last 4 Encounters:   04/24/23 75.1 kg (165 lb 9.6 oz)   04/16/23 80.8 kg (178 lb 3.2 oz)     CODE STATUS/ADVANCE DIRECTIVES DISCUSSION:  Prior  DNR / DNI  ALLERGIES:   Allergies   Allergen Reactions     Alendronic Acid      Other reaction(s): Reflux     Calcitonin      Other reaction(s): Reflux      PAST MEDICAL HISTORY:   Past Medical History:   Diagnosis Date     Bilateral lower extremity edema      Dementia (H)      Hyperlipidemia      Hypothyroidism       PAST SURGICAL HISTORY:   has a past surgical history that includes Meniscal tear repair and Open reduction internal fixation hip nailing (Left, 4/16/2023).  FAMILY HISTORY: family history includes Heart Failure in her mother.  SOCIAL HISTORY:   reports that she has never smoked. She does not have any smokeless tobacco history on file. She reports that she does not currently use alcohol.  Patient's living condition: lives in an assisted living facility, memory care unit    Post Discharge Medication Reconciliation Status:   MED REC REQUIRED  Post Medication Reconciliation Status: discharge medications reconciled and changed, per note/orders       Current Outpatient Medications   Medication Sig     acetaminophen (TYLENOL) 500 MG tablet Take 1,000 mg by mouth 3 times daily     aspirin (ASA) 81 MG EC tablet Take 1 tablet (81 mg) by mouth 2 times daily     busPIRone (BUSPAR) 7.5 MG tablet Take 7.5 mg by mouth At Bedtime     calcium carbonate-vitamin D (OSCAL) 500-5 MG-MCG tablet Take 1 tablet by mouth daily     donepezil (ARICEPT) 5 MG ODT Take 2.5 mg by mouth daily     furosemide (LASIX) 40 MG tablet Take 40 mg by mouth daily     levothyroxine (SYNTHROID/LEVOTHROID) 50 MCG tablet Take 50 mcg by mouth daily     melatonin 3  MG tablet Take 3 mg by mouth nightly as needed     multivitamin, therapeutic (THERA-VIT) TABS tablet Take 1 tablet by mouth daily     nystatin (MYCOSTATIN) 618926 UNIT/GM external powder Apply topically 2 times daily Wash and dry area under breasts before applying powder     ondansetron (ZOFRAN ODT) 4 MG disintegrating tablet [ONDANSETRON (ZOFRAN ODT) 4 MG DISINTEGRATING TABLET] Take 1 tablet (4 mg total) by mouth every 8 (eight) hours as needed for nausea.     sertraline (ZOLOFT) 50 MG tablet Take 50 mg by mouth daily     traMADol (ULTRAM) 50 MG tablet Take 1 tablet (50 mg) by mouth every 6 hours as needed for moderate pain     No current facility-administered medications for this visit.     Facility-Administered Medications Ordered in Other Visits   Medication     donepezil (ARICEPT) half-tab 2.5 mg     QUEtiapine (SEROquel) quarter-tab 6.25 mg     ramelteon (ROZEREM) tablet 8 mg       ROS:  10 point ROS of systems including Constitutional, Eyes, Respiratory, Cardiovascular, Gastroenterology, Genitourinary, Integumentary, Musculoskeletal, Psychiatric were all negative except for pertinent positives noted in my HPI.    Vitals:  /59   Pulse 89   Temp 96.8  F (36  C)   Resp 18   Wt 75.1 kg (165 lb 9.6 oz)   SpO2 95%   BMI 32.34 kg/m    Exam:  GENERAL APPEARANCE:  Asleep, in moderate distress at rest, lying asleep with furrowed brow   ENT:  Mouth and posterior oropharynx normal, moist mucous membranes  CHEST/RESP:  respiratory effort normal, no respiratory distress, lung sounds CTA    CV:  Rate and rhythm reg, no murmur, 1+ peripheral edema  M/S:   extremities abnormal, unable to ambulate-requires two to transfer  PSYCH:  Confused, somnolent     Lab/Diagnostic data:  Most Recent 3 CBC's:Recent Labs   Lab Test 04/19/23  0822 04/18/23  0222 04/17/23  1252 04/17/23  0742 04/15/23  1314   WBC  --  8.5  --  10.1 13.2*   HGB 8.6* 8.5* 9.8* 9.1* 12.7   MCV  --  97  --  97 99   PLT  --  161  --  172 218     Most  of your medical record, email your doctor's office, and request medication refills online. 1. In your internet browser, go to [email or web address] 2. Click on the First Time User? Click Here link in the Sign In box. You will see the New Member Sign Up page. 3. Enter your Sent[employer] Access Code exactly as it appears below. You will not need to use this code after youve completed the sign-up process. If you do not sign up before the expiration date, you must request a new code. · [employer] Access Code: [email or web address] Expires: 3/21/2018  9:55 AM 
 
4. Enter the last four digits of your Social Security Number (xxxx) and Date of Birth (mm/dd/yyyy) as indicated and click Submit. You will be taken to the next sign-up page. 5. Create a SentreHEART ID. This will be your [employer]HE[employer] login ID and cannot be changed, so think of one that is secure and easy to remember. 6. Create a Sent[employer]HE[employer] password. You can change your password at any time. 7. Enter your Password Reset Question and Answer. This can be used at a later time if you forget your password. 8. Enter your e-mail address. You will receive e-mail notification when new information is available in [address] 9. Click Sign Up. You can now view and download portions of your medical record. 10. Click the Download Summary menu link to download a portable copy of your medical information. If you have questions, please visit the Frequently Asked Questions section of the [employer] website. Remember, Sent[employer] is NOT to be used for urgent needs. For medical emergencies, dial 911. Now available from your iPhone and Android! Please provide this summary of care documentation to your next provider. Your primary care clinician is listed as Jay Barnett. If you have any questions after today's visit, please call [phone]. Recent 3 BMP's:Recent Labs   Lab Test 04/18/23  0222 04/17/23  0742 04/15/23  2026 04/15/23  1314    138  --  138   POTASSIUM 4.2 4.3  --  4.0   CHLORIDE 109* 107  --  102   CO2 23 23  --  25   BUN 27 22  --  26.2*   CR 0.87 0.83  --  1.12*   ANIONGAP 8 8  --  11   TIM 8.5 8.8  --  9.2   GLC 94 115 136* 108*       ASSESSMENT/PLAN:    Acute on chronic conditions, tenuous clinical status    Closed displaced subtrochanteric fracture of left femur with routine healing, subsequent encounter  S/P ORIF (open reduction internal fixation) fracture  Physical deconditioning  Generalized muscle weakness  New fracture S/P ORIF on 4/17/23.  Requires 2 to assist with transfers now, unable to verbalize pain control issues.  Has prn tramadol, prn tylenol available  -Pain control with prns inadequate, will schedule extra strength tylenol   -Bowel management per facility protocol  -DVT prophylaxis with aspirin 81 BID x 30 days  -follow up with Orthopedics in about 2 weeks  -Physical, Occupational Therapy for strengthening and improved mobility, goal to return to prior level of function   -SW to assist with discharge planning      Anemia due to blood loss, acute  Baseline hemoglobin 12-13, down to 8.6 after fall and surgery.  -recheck CBC this week.      Severe dementia with other behavioral disturbance, unspecified dementia type (H)  Insomnia, unspecified type  Delirium  Anxiety  Depression, unspecified depression type  Patient with known history of dementia, on low dose donepezil, buspirone, sertraline.  She developed encephalopathy/delirium while hospitalized which is common.  She was started on low dose scheduled quetiapine, as well as ramelteon at .  She has been quite somnolent during the day and this is affecting her ability to participate in therapy.   Discussed plan of care with daughter, Angela, including options for changes in antipsychotic medications.     -control of pain which will improve mentation   -discontinue  scheduled quetiapine  -prn quetiapine as noted below  -discontinue ramelteon  -start prn melatonin as noted below     Bilateral lower extremity edema  Minimal edema today, controlled with use of lasix 40 mg daily.   -check BMP this week     Hypothyroidism, unspecified type  Last TSH 10.02 on 4/16/23, on levothyroxine 50.  TSH slightly elevated.    -Noted goal in escobar pts 4-5 and  New studies have shown there is no  difference in hypothyroid symptoms or tiredness scores after one year of treatment if TSH is between the upper reference limit and 9.9 mU/L.  (Char DJ, Seven N, Toshia PM, et al. Thyroid Hormone Therapy for Older Adults with Subclinical Hypothyroidism. N Engl J Med 2017. Treatment with levothyroxine provides no symptomatic benefit in older adults with subclinical hypothyroidism (April 2017 Up to Date)  -The current medical regimen is effective;  continue present plan and medications.    -consider recheck of TSH after this acute condition has resolved      Orders:    Discontinue the following medications:  o Meclizine  o Tizanidine  o Prn tylenol  o ramelteon    Quetiapine 6.25 mg po TID PRN x 14 days for dementia with aggressive/injurious behaviors    Extra strength tylenol 1000 mg po TID for pain    Melatonin 3 mg po at bedtime PRN for insomnia    Labs 4/26/23 to include BMP, CBC    Follow up with in 1 week or as needed     Electronically signed by:  CONRADO Rodriguez CNP       Sincerely,        CONRADO Rodriguez CNP

## 2023-04-24 NOTE — PROGRESS NOTES
[facility] GERIATRICS    PRIMARY CARE PROVIDER AND CLINIC:  Yamel Manzanares, NP, [address] [address] / [facility] [address]  Chief Complaint   Patient presents with     GANESH      [place] Medical Record Number:  [id number]  Place of Service where encounter took place:  [facility] (TCU/SNF) [4000]    Shanta Tello  is a 82 year old female (1940), admitted to the above facility from  [facility]. Hospital stay 4/15/23 through 4/20/23..   HPI:    Shanta has a past medical history of dementia and lives in memory care.  She also has past medical history significant for hypothyroid, anxiety/depression, LE edema..  S/he was admitted to the hospital after a mechanical fall at her memory care assisted living facility and found to have closed, comminuted fracture of the proximal L femur.  She underwent ORIF on 4/17/23. The hospital stay was complicated with ABL anemia 12.7 -->8.6, delirium on known history of severe dementia.     Follow up needs:   -[facility], Dr. Arredondo, in about 2 weeks   -continue aspirin 81 mg po BID x 30 days for DVT prophylaxis S/P surgery    Today's concern is:   Shanta is somnolent and unable to answer simple questions today.  She is responsive to stimuli but lying in bed with eyes closed.  Nursing and therapy team report she is combative with cares and hits out at staff.  She is observed to yell with cares.  She does sleep peacefully at night, per nursing report.    Daughter, Angela, who is involved in cares reports that Shanta was ambulatory about her assisted living facility without device prior to this, good appetite but was able to toilet independently.  She was noted to occasionally have behaviors with cares, but overall manageable.  Per Angela, she was started on quetiapine, ramelteon, tizanidine at hospital.  She is worries as Shanta is quite sedated today. She also reports she has rarely used meclizine since moving to assisted  living facility.     BP Readings from Last 3 Encounters:   04/24/23 114/59   04/20/23 138/69   04/15/23 (!) 154/97      Wt Readings from Last 4 Encounters:   04/24/23 75.1 kg (165 lb 9.6 oz)   04/16/23 80.8 kg (178 lb 3.2 oz)     CODE STATUS/ADVANCE DIRECTIVES DISCUSSION:  Prior  DNR / DNI  ALLERGIES:   Allergies   Allergen Reactions     Alendronic Acid      Other reaction(s): Reflux     Calcitonin      Other reaction(s): Reflux      PAST MEDICAL HISTORY:   Past Medical History:   Diagnosis Date     Bilateral lower extremity edema      Dementia (H)      Hyperlipidemia      Hypothyroidism       PAST SURGICAL HISTORY:   has a past surgical history that includes Meniscal tear repair and Open reduction internal fixation hip nailing (Left, 4/16/2023).  FAMILY HISTORY: family history includes Heart Failure in her mother.  SOCIAL HISTORY:   reports that she has never smoked. She does not have any smokeless tobacco history on file. She reports that she does not currently use alcohol.  Patient's living condition: lives in an assisted living facility, memory care unit    Post Discharge Medication Reconciliation Status:   MED REC REQUIRED  Post Medication Reconciliation Status: discharge medications reconciled and changed, per note/orders       Current Outpatient Medications   Medication Sig     acetaminophen (TYLENOL) 500 MG tablet Take 1,000 mg by mouth 3 times daily     aspirin (ASA) 81 MG EC tablet Take 1 tablet (81 mg) by mouth 2 times daily     busPIRone (BUSPAR) 7.5 MG tablet Take 7.5 mg by mouth At Bedtime     calcium carbonate-vitamin D (OSCAL) 500-5 MG-MCG tablet Take 1 tablet by mouth daily     donepezil (ARICEPT) 5 MG ODT Take 2.5 mg by mouth daily     furosemide (LASIX) 40 MG tablet Take 40 mg by mouth daily     levothyroxine (SYNTHROID/LEVOTHROID) 50 MCG tablet Take 50 mcg by mouth daily     melatonin 3 MG tablet Take 3 mg by mouth nightly as needed     multivitamin, therapeutic (THERA-VIT) TABS tablet Take 1  tablet by mouth daily     nystatin (MYCOSTATIN) 635366 UNIT/GM external powder Apply topically 2 times daily Wash and dry area under breasts before applying powder     ondansetron (ZOFRAN ODT) 4 MG disintegrating tablet [ONDANSETRON (ZOFRAN ODT) 4 MG DISINTEGRATING TABLET] Take 1 tablet (4 mg total) by mouth every 8 (eight) hours as needed for nausea.     sertraline (ZOLOFT) 50 MG tablet Take 50 mg by mouth daily     traMADol (ULTRAM) 50 MG tablet Take 1 tablet (50 mg) by mouth every 6 hours as needed for moderate pain     No current facility-administered medications for this visit.     Facility-Administered Medications Ordered in Other Visits   Medication     donepezil (ARICEPT) half-tab 2.5 mg     QUEtiapine (SEROquel) quarter-tab 6.25 mg     ramelteon (ROZEREM) tablet 8 mg       ROS:  10 point ROS of systems including Constitutional, Eyes, Respiratory, Cardiovascular, Gastroenterology, Genitourinary, Integumentary, Musculoskeletal, Psychiatric were all negative except for pertinent positives noted in my HPI.    Vitals:  /59   Pulse 89   Temp 96.8  F (36  C)   Resp 18   Wt 75.1 kg (165 lb 9.6 oz)   SpO2 95%   BMI 32.34 kg/m    Exam:  GENERAL APPEARANCE:  Asleep, in moderate distress at rest, lying asleep with furrowed brow   ENT:  Mouth and posterior oropharynx normal, moist mucous membranes  CHEST/RESP:  respiratory effort normal, no respiratory distress, lung sounds CTA    CV:  Rate and rhythm reg, no murmur, 1+ peripheral edema  M/S:   extremities abnormal, unable to ambulate-requires two to transfer  PSYCH:  Confused, somnolent     Lab/Diagnostic data:  Most Recent 3 CBC's:Recent Labs   Lab Test 04/19/23  0822 04/18/23  0222 04/17/23  1252 04/17/23  0742 04/15/23  1314   WBC  --  8.5  --  10.1 13.2*   HGB 8.6* 8.5* 9.8* 9.1* 12.7   MCV  --  97  --  97 99   PLT  --  161  --  172 218     Most Recent 3 BMP's:Recent Labs   Lab Test 04/18/23 0222 04/17/23  0742 04/15/23  2026 04/15/23  1314     138  --  138   POTASSIUM 4.2 4.3  --  4.0   CHLORIDE 109* 107  --  102   CO2 23 23  --  25   BUN 27 22  --  26.2*   CR 0.87 0.83  --  1.12*   ANIONGAP 8 8  --  11   TIM 8.5 8.8  --  9.2   GLC 94 115 136* 108*       ASSESSMENT/PLAN:    Acute on chronic conditions, tenuous clinical status    Closed displaced subtrochanteric fracture of left femur with routine healing, subsequent encounter  S/P ORIF (open reduction internal fixation) fracture  Physical deconditioning  Generalized muscle weakness  New fracture S/P ORIF on 4/17/23.  Requires 2 to assist with transfers now, unable to verbalize pain control issues.  Has prn tramadol, prn tylenol available  -Pain control with prns inadequate, will schedule extra strength tylenol   -Bowel management per facility protocol  -DVT prophylaxis with aspirin 81 BID x 30 days  -follow up with Orthopedics in about 2 weeks  -Physical, Occupational Therapy for strengthening and improved mobility, goal to return to prior level of function   -SW to assist with discharge planning      Anemia due to blood loss, acute  Baseline hemoglobin 12-13, down to 8.6 after fall and surgery.  -recheck CBC this week.      Severe dementia with other behavioral disturbance, unspecified dementia type (H)  Insomnia, unspecified type  Delirium  Anxiety  Depression, unspecified depression type  Patient with known history of dementia, on low dose donepezil, buspirone, sertraline.  She developed encephalopathy/delirium while hospitalized which is common.  She was started on low dose scheduled quetiapine, as well as ramelteon at .  She has been quite somnolent during the day and this is affecting her ability to participate in therapy.   Discussed plan of care with daughter, Angela, including options for changes in antipsychotic medications.     -control of pain which will improve mentation   -discontinue scheduled quetiapine  -prn quetiapine as noted below  -discontinue ramelteon  -start prn melatonin as noted  below     Bilateral lower extremity edema  Minimal edema today, controlled with use of lasix 40 mg daily.   -check BMP this week     Hypothyroidism, unspecified type  Last TSH 10.02 on 4/16/23, on levothyroxine 50.  TSH slightly elevated.    -Noted goal in escobar pts 4-5 and  New studies have shown there is no  difference in hypothyroid symptoms or tiredness scores after one year of treatment if TSH is between the upper reference limit and 9.9 mU/L.  (Char DJ, Seven N, Toshia PM, et al. Thyroid Hormone Therapy for Older Adults with Subclinical Hypothyroidism. N Engl J Med 2017. Treatment with levothyroxine provides no symptomatic benefit in older adults with subclinical hypothyroidism (April 2017 Up to Date)  -The current medical regimen is effective;  continue present plan and medications.    -consider recheck of TSH after this acute condition has resolved      Orders:    Discontinue the following medications:  o Meclizine  o Tizanidine  o Prn tylenol  o ramelteon    Quetiapine 6.25 mg po TID PRN x 14 days for dementia with aggressive/injurious behaviors    Extra strength tylenol 1000 mg po TID for pain    Melatonin 3 mg po at bedtime PRN for insomnia    Labs 4/26/23 to include BMP, CBC    Follow up with in 1 week or as needed     Electronically signed by:  CONRADO Rodriguez CNP

## 2023-04-26 NOTE — PROGRESS NOTES
[facility] GERIATRICS    Chief Complaint   Patient presents with     RECHECK     HPI:  Shanta Tello is a 82 year old  (1940), who is being seen today for an episodic care visit at: [facility] (U/SNF) [[id number]].     HPI:    Shanta has a past medical history of dementia and lives in memory care.  She also has past medical history significant for hypothyroid, anxiety/depression, LE edema..  S/he was admitted to the hospital after a mechanical fall at her memory care assisted living facility and found to have closed, comminuted fracture of the proximal L femur.  She underwent ORIF on 4/17/23. The hospital stay was complicated with ABL anemia 12.7 -->8.6, delirium on known history of severe dementia.      Follow up needs:   -[facility], Dr. Arredondo, in about 2 weeks   -continue aspirin 81 mg po BID x 30 days for DVT prophylaxis S/P surgery    Today's concern is:   Shanta speaks in nonsensical words, unable to answer simple questions.  She is observed today to be awake and laughing at cartoons on the TV.  Nursing reports she has been awake most of the day today, is sleeping well at night.  She has not required any prn quetiapine, no prn melatonin, and does not endorse pain when questioned.  Nursing staff note she is occasionally combative with cares, but this is improving over last few days as well.      BP Readings from Last 3 Encounters:   04/26/23 114/59   04/24/23 114/59   04/20/23 138/69        Wt Readings from Last 4 Encounters:   04/26/23 71.7 kg (158 lb)   04/24/23 75.1 kg (165 lb 9.6 oz)   04/16/23 80.8 kg (178 lb 3.2 oz)         Allergies, and PMH/PSH reviewed in EPIC today.  REVIEW OF SYSTEMS:  Unobtainable secondary to cognitive impairment.     Objective:   /59   Pulse 89   Temp 96.8  F (36  C)   Resp 18   Ht 1.524 m (5')   Wt 71.7 kg (158 lb)   SpO2 95%   BMI 30.86 kg/m    GENERAL APPEARANCE:  Alert, in no  distress at rest  ENT:  Mouth and posterior oropharynx  normal, moist mucous membranes, hearing acuity - within normal limits   CHEST/RESP:  respiratory effort normal, no respiratory distress, lung sounds CTA    CV:  Rate and rhythm reg, no murmur, 2+ peripheral edema bilateral LE   NEUROLOGIC EXAM: no focal deficit,   PSYCH:  Alert and unable to determine orientation due to cognitive impairment     Most Recent 3 CBC's:Recent Labs   Lab Test 04/19/23  0822 04/18/23  0222 04/17/23  1252 04/17/23  0742 04/15/23  1314   WBC  --  8.5  --  10.1 13.2*   HGB 8.6* 8.5* 9.8* 9.1* 12.7   MCV  --  97  --  97 99   PLT  --  161  --  172 218     Most Recent 3 BMP's:Recent Labs   Lab Test 04/18/23 0222 04/17/23  0742 04/15/23  2026 04/15/23  1314    138  --  138   POTASSIUM 4.2 4.3  --  4.0   CHLORIDE 109* 107  --  102   CO2 23 23  --  25   BUN 27 22  --  26.2*   CR 0.87 0.83  --  1.12*   ANIONGAP 8 8  --  11   TIM 8.5 8.8  --  9.2   GLC 94 115 136* 108*       Assessment/Plan:  Closed displaced subtrochanteric fracture of left femur with routine healing, subsequent encounter  S/P ORIF (open reduction internal fixation) fracture  Physical deconditioning  Generalized muscle weakness  Patient showing gradual improvement after fall with L femur fracture S/P ORIF.  She has only required 1 tramadol since admission, and she does not appear to be in pain today.    -Pain control with scheduled extra strength tylenol  adequate, continue current plan   -DVT prophylaxis with aspirin bid x 30 days  -follow up with Orthopedics as scheduled   -Physical, Occupational Therapy for strengthening and improved mobility, goal to return to prior level of function   -SW to assist with discharge planning      Leukocytosis  Unclear etiology for leukocytosis, she is without respiratory symptoms, unable to state if she is having pain or other concerns. Incision reported to be CDI without symptoms of infection  -repeat CBC 5/1/23   -UA/C to rule out UTI    Severe dementia with other behavioral disturbance,  unspecified dementia type (H)  Delirium   Patient with severe dementia.  She is now awake today and speaks in nonsensical word salad.  She is pleasant and laughing and smiling.  She has not required prn quetiapine, and she is reported to be sleeping well at night, per review of records.   -continue current plan without changes, goal to discontinue quetiapine     Anemia due to blood loss, acute  Baseline hemoglobin 12-13, down to 8.6 after fall/surgery.  Today, hemoglobin improving to 10.1 from 8.6.  -recheck hemoglobin 5/1/23        MED REC REQUIRED  Post Medication Reconciliation Status: patient was not discharged from an inpatient facility or TCU        Orders:    completed FMLA paperwork for daughter      UA/conditional UC , via strait cath due to leukocytosis and increased BUM    Push fluids 250 ml TID     Repeat BMP and CBC 5/1/23     Follow up within 1 week    Electronically signed by: CONRADO Rodriguez CNP

## 2023-04-26 NOTE — LETTER
4/26/2023        RE: Shanta Tello  [facility]  [address]  [facility] [address]        [facility] GERIATRICS    Chief Complaint   Patient presents with     RECHECK     HPI:  Shanta Tello is a 82 year old  (1940), who is being seen today for an episodic care visit at: [facility] (U/SNF) [[id number]].     HPI:    Shanta has a past medical history of dementia and lives in memory care.  She also has past medical history significant for hypothyroid, anxiety/depression, LE edema..  S/he was admitted to the hospital after a mechanical fall at her [facility] care assisted living facility and found to have closed, comminuted fracture of the proximal L femur.  She underwent ORIF on 4/17/23. The hospital stay was complicated with ABL anemia 12.7 -->8.6, delirium on known history of severe dementia.      Follow up needs:   -[facility], Dr. Arredondo, in about 2 weeks   -continue aspirin 81 mg po BID x 30 days for DVT prophylaxis S/P surgery    Today's concern is:   Shanta speaks in nonsensical words, unable to answer simple questions.  She is observed today to be awake and laughing at cartoons on the TV.  Nursing reports she has been awake most of the day today, is sleeping well at night.  She has not required any prn quetiapine, no prn melatonin, and does not endorse pain when questioned.  Nursing staff note she is occasionally combative with cares, but this is improving over last few days as well.      BP Readings from Last 3 Encounters:   04/26/23 114/59   04/24/23 114/59   04/20/23 138/69        Wt Readings from Last 4 Encounters:   04/26/23 71.7 kg (158 lb)   04/24/23 75.1 kg (165 lb 9.6 oz)   04/16/23 80.8 kg (178 lb 3.2 oz)         Allergies, and PMH/PSH reviewed in EPIC today.  REVIEW OF SYSTEMS:  Unobtainable secondary to cognitive impairment.     Objective:   /59   Pulse 89   Temp 96.8  F (36  C)   Resp 18   Ht 1.524 m (5')   Wt 71.7 kg (158 lb)   SpO2 95%   BMI  30.86 kg/m    GENERAL APPEARANCE:  Alert, in no  distress at rest  ENT:  Mouth and posterior oropharynx normal, moist mucous membranes, hearing acuity - within normal limits   CHEST/RESP:  respiratory effort normal, no respiratory distress, lung sounds CTA    CV:  Rate and rhythm reg, no murmur, 2+ peripheral edema bilateral LE   NEUROLOGIC EXAM: no focal deficit,   PSYCH:  Alert and unable to determine orientation due to cognitive impairment     Most Recent 3 CBC's:Recent Labs   Lab Test 04/19/23  0822 04/18/23  0222 04/17/23  1252 04/17/23  0742 04/15/23  1314   WBC  --  8.5  --  10.1 13.2*   HGB 8.6* 8.5* 9.8* 9.1* 12.7   MCV  --  97  --  97 99   PLT  --  161  --  172 218     Most Recent 3 BMP's:Recent Labs   Lab Test 04/18/23 0222 04/17/23  0742 04/15/23  2026 04/15/23  1314    138  --  138   POTASSIUM 4.2 4.3  --  4.0   CHLORIDE 109* 107  --  102   CO2 23 23  --  25   BUN 27 22  --  26.2*   CR 0.87 0.83  --  1.12*   ANIONGAP 8 8  --  11   TIM 8.5 8.8  --  9.2   GLC 94 115 136* 108*       Assessment/Plan:  Closed displaced subtrochanteric fracture of left femur with routine healing, subsequent encounter  S/P ORIF (open reduction internal fixation) fracture  Physical deconditioning  Generalized muscle weakness  Patient showing gradual improvement after fall with L femur fracture S/P ORIF.  She has only required 1 tramadol since admission, and she does not appear to be in pain today.    -Pain control with scheduled extra strength tylenol  adequate, continue current plan   -DVT prophylaxis with aspirin bid x 30 days  -follow up with Orthopedics as scheduled   -Physical, Occupational Therapy for strengthening and improved mobility, goal to return to prior level of function   -SW to assist with discharge planning      Leukocytosis  Unclear etiology for leukocytosis, she is without respiratory symptoms, unable to state if she is having pain or other concerns. Incision reported to be CDI without symptoms of  infection  -repeat CBC 5/1/23   -UA/C to rule out UTI    Severe dementia with other behavioral disturbance, unspecified dementia type (H)  Delirium   Patient with severe dementia.  She is now awake today and speaks in nonsensical word salad.  She is pleasant and laughing and smiling.  She has not required prn quetiapine, and she is reported to be sleeping well at night, per review of records.   -continue current plan without changes, goal to discontinue quetiapine     Anemia due to blood loss, acute  Baseline hemoglobin 12-13, down to 8.6 after fall/surgery.  Today, hemoglobin improving to 10.1 from 8.6.  -recheck hemoglobin 5/1/23        MED REC REQUIRED  Post Medication Reconciliation Status: patient was not discharged from an inpatient facility or TCU        Orders:    completed FMLA paperwork for daughter      UA/conditional UC , via strait cath due to leukocytosis and increased BUM    Push fluids 250 ml TID     Repeat BMP and CBC 5/1/23     Follow up within 1 week    Electronically signed by: CONRADO Rodriguez CNP         Sincerely,        CONRADO Rodriguez CNP

## 2023-04-28 NOTE — PROGRESS NOTES
[place] GERIATRIC SERVICES  PRIMARY CARE PROVIDER AND CLINIC:  Yamel Manzanares NP, [address] [facility] [address] / [facility] [address]  Chief Complaint   Patient presents with     Hospital F/U     [place] Medical Record Number:  [id number]  Place of Service where encounter took place:  [facility] (TCU/SNF) [[id number]]    Shanta Tello  is a 82 year old  (1940), admitted to the above facility from  [facility]. Hospital stay 4/15/23 through 4/20/23..  Admitted to this facility for  rehab, medical management and nursing care.    HPI:    HPI information obtained from: facility chart records, facility staff and [facility] chart review.   Brief Summary of Hospital Course:   *Patient with PMH pertinent for dementia, residing in a memory care unit had mechanical fall that resulted in closed comminuted fracture of the proximal left femur status post ORIF [4/17].  *Hospital stay complicated with ABLA, hemoglobin reached dung 8.6, and surgical delirium  ** Evaluated by PMR and felt to benefit from TCU placement.     Updates on Status Since Skilled nursing Admission:   4/26: Leukocytosis  And thrombocytosis, afebrile, asymptomatic. UA negative for UTI.   4/?: Seroquel 6.25 mg qid was discontinued last week due to lethargy  4/30: very physically aggressive  behavior and cares could not be completed. PRN Seroquel was not given, query 2/2 behaviors.     TODAY:  -Left femur fx: denies pain  - ABLA: denies dizziness.   - Dementia: RN reports pleasantly confused.   - Rehab: gong fine as per staff      =====================================================  CODE STATUS/ADVANCE DIRECTIVES DISCUSSION:   DNR / DNI  Patient's living condition: lives in an assisted living facility  ALLERGIES: Alendronate and Calcitonin  PAST MEDICAL HISTORY:  has a past medical history of Bilateral lower extremity edema, Dementia (H), Hyperlipidemia, and Hypothyroidism.  PAST SURGICAL HISTORY:   has a  past surgical history that includes Meniscal tear repair and Open reduction internal fixation hip nailing (Left, 4/16/2023).  FAMILY HISTORY: family history includes Heart Failure in her mother.  SOCIAL HISTORY:   reports that she has never smoked. She does not have any smokeless tobacco history on file. She reports that she does not currently use alcohol.    Post Discharge Medication Reconciliation Status: medication reconcilation previously completed during another office visit  Current Outpatient Medications   Medication Sig Dispense Refill     acetaminophen (TYLENOL) 500 MG tablet Take 1,000 mg by mouth 3 times daily       aspirin (ASA) 81 MG EC tablet Take 1 tablet (81 mg) by mouth 2 times daily 60 tablet 0     busPIRone (BUSPAR) 7.5 MG tablet Take 7.5 mg by mouth At Bedtime       calcium carbonate-vitamin D (OSCAL) 500-5 MG-MCG tablet Take 1 tablet by mouth daily       donepezil (ARICEPT) 5 MG ODT Take 2.5 mg by mouth daily       furosemide (LASIX) 40 MG tablet Take 40 mg by mouth daily       levothyroxine (SYNTHROID/LEVOTHROID) 50 MCG tablet Take 50 mcg by mouth daily       melatonin 3 MG tablet Take 3 mg by mouth nightly as needed       multivitamin, therapeutic (THERA-VIT) TABS tablet Take 1 tablet by mouth daily       nystatin (MYCOSTATIN) 643408 UNIT/GM external powder Apply topically 2 times daily Wash and dry area under breasts before applying powder       ondansetron (ZOFRAN ODT) 4 MG disintegrating tablet [ONDANSETRON (ZOFRAN ODT) 4 MG DISINTEGRATING TABLET] Take 1 tablet (4 mg total) by mouth every 8 (eight) hours as needed for nausea. 30 tablet 0     QUEtiapine (SEROQUEL) 25 MG tablet Take 0.25 tablets (6.25 mg) by mouth 3 times daily as needed (aggressive behaviors/anxiety/self harm/)       sertraline (ZOLOFT) 50 MG tablet Take 50 mg by mouth daily       traMADol (ULTRAM) 50 MG tablet Take 1 tablet (50 mg) by mouth every 6 hours as needed for moderate pain 30 tablet 0     ROS:  Limited secondary to  cognitive impairment but today pt reports     Vitals:  /65   Pulse 79   Temp 98.5  F (36.9  C)   Resp 16   Ht 1.524 m (5')   Wt 71.7 kg (158 lb)   SpO2 93%   BMI 30.86 kg/m    Exam:  GENERAL APPEARANCE:  in no distress,   RESP:  Unlabored breathing. CTA b/l.   CV:  S1S2 audible, regular HR, no murmur appreciated.   ABDOMEN:  soft, NT/ND, BS audible.   M/S:   no joint deformity noted on observation.   SKIN:  No rash noted on observation  NEURO:   No NFD appreciated on observation.   PSYCH:  affect and mood normal, pleasantly disoriented.       Lab/Diagnostic data: Reviewed in the chart and EHR.      ASSESSMENT/PLAN:  (S72.22XD) Closed displaced subtrochanteric fracture of left femur with routine healing, subsequent encounter  (primary encounter diagnosis)  (Z98.890,  Z87.81) S/P ORIF (open reduction internal fixation) fracture  (R53.81) Physical deconditioning  -- Analgesia optimal with the current regimen. Continue present plan and medications.  - Followed by Orthopedic Team. Follow on the recommendations / instructions.   - DVT prophylaxis according to Orthopedic team recommendations  - Started rehab program, making a progress, continue until desired goal is achieved.       (F03.C18) Severe dementia with other behavioral disturbance, unspecified dementia type (H)  4/24: Seroquel 6.25 mg qid  Scheduled and [place] were discontinued last week due to lethargy, started on Seroquel 6.25 mg  Tid prn x 14 days.   4/30: very physically aggressive  behavior and cares could not be completed. PRN Seroquel was not given, query 2/2 behaviors.   - will scheduled risperidone 0.25 mg bid for 1-2 weeks, then slowly GDR.   -will change melatonin from prn to scheduled and increase dose to 5 mg.          (D50.0) Blood loss anemia  (D72.829) Leukocytosis, unspecified type  (D75.839) Thrombocytosis  4/26: Leukocytosis  And thrombocytosis, afebrile, asymptomatic. UA negative for UTI.   -today leukocytes 12 K, trending down,  Hb stable. And thrombocytosis resovled. Rbc continued to be low. Pt is clinically stable.     CKD3a, GFR 45-59 ml/min (H):  - renal function improving.   - - Avoid nephrotoxic  medications. Renally dose medications. Monitor electrolytes, and dehydration status        ORDER:  - schedule risperidone 0.25 mg bid for 2 weeks, then reduce to once daily at HS  -  change melatonin from prn to scheduled and increase dose to 5 mg.    - orders faxed to the facility today after hours.         Electronically signed by:  Kobe Montenegro MD

## 2023-05-01 NOTE — LETTER
5/1/2023        RE: Shanta Tello  [facility]  [address]  [place] [address]         [place] [facility]ERIATRIC SERVICES  PRIMARY CARE PROVIDER AND CLINIC:  Yamel Manzanares NP, [address] [address] / [facility] [address]  Chief Complaint   Patient presents with     Hospital F/U     [place] Medical Record Number:  [id number]  Place of Service where encounter took place:  [facility] (TCU/SNF) [[id number]]    Shanta Tello  is a 82 year old  (1940), admitted to the above facility from  [facility]. Hospital stay 4/15/23 through 4/20/23..  Admitted to this facility for  rehab, medical management and nursing care.    HPI:    HPI information obtained from: facility chart records, facility staff and [facility] chart review.   Brief Summary of Hospital Course:   *Patient with PMH pertinent for dementia, residing in a memory care unit had mechanical fall that resulted in closed comminuted fracture of the proximal left femur status post ORIF [4/17].  *Hospital stay complicated with ABLA, hemoglobin reached dung 8.6, and surgical delirium  ** Evaluated by PMR and felt to benefit from TCU placement.     Updates on Status Since Skilled nursing Admission:   4/26: Leukocytosis  And thrombocytosis, afebrile, asymptomatic. UA negative for UTI.   4/?: Seroquel 6.25 mg qid was discontinued last week due to lethargy  4/30: very physically aggressive  behavior and cares could not be completed. PRN Seroquel was not given, query 2/2 behaviors.     TODAY:  -Left femur fx: denies pain  - ABLA: denies dizziness.   - Dementia: RN reports pleasantly confused.   - Rehab: gong fine as per staff      =====================================================  CODE STATUS/ADVANCE DIRECTIVES DISCUSSION:   DNR / DNI  Patient's living condition: lives in an assisted living facility  ALLERGIES: Alendronate and Calcitonin  PAST MEDICAL HISTORY:  has a past medical history of Bilateral  lower extremity edema, Dementia (H), Hyperlipidemia, and Hypothyroidism.  PAST SURGICAL HISTORY:   has a past surgical history that includes Meniscal tear repair and Open reduction internal fixation hip nailing (Left, 4/16/2023).  FAMILY HISTORY: family history includes Heart Failure in her mother.  SOCIAL HISTORY:   reports that she has never smoked. She does not have any smokeless tobacco history on file. She reports that she does not currently use alcohol.    Post Discharge Medication Reconciliation Status: medication reconcilation previously completed during another office visit  Current Outpatient Medications   Medication Sig Dispense Refill     acetaminophen (TYLENOL) 500 MG tablet Take 1,000 mg by mouth 3 times daily       aspirin (ASA) 81 MG EC tablet Take 1 tablet (81 mg) by mouth 2 times daily 60 tablet 0     busPIRone (BUSPAR) 7.5 MG tablet Take 7.5 mg by mouth At Bedtime       calcium carbonate-vitamin D (OSCAL) 500-5 MG-MCG tablet Take 1 tablet by mouth daily       donepezil (ARICEPT) 5 MG ODT Take 2.5 mg by mouth daily       furosemide (LASIX) 40 MG tablet Take 40 mg by mouth daily       levothyroxine (SYNTHROID/LEVOTHROID) 50 MCG tablet Take 50 mcg by mouth daily       melatonin 3 MG tablet Take 3 mg by mouth nightly as needed       multivitamin, therapeutic (THERA-VIT) TABS tablet Take 1 tablet by mouth daily       nystatin (MYCOSTATIN) 934845 UNIT/GM external powder Apply topically 2 times daily Wash and dry area under breasts before applying powder       ondansetron (ZOFRAN ODT) 4 MG disintegrating tablet [ONDANSETRON (ZOFRAN ODT) 4 MG DISINTEGRATING TABLET] Take 1 tablet (4 mg total) by mouth every 8 (eight) hours as needed for nausea. 30 tablet 0     QUEtiapine (SEROQUEL) 25 MG tablet Take 0.25 tablets (6.25 mg) by mouth 3 times daily as needed (aggressive behaviors/anxiety/self harm/)       sertraline (ZOLOFT) 50 MG tablet Take 50 mg by mouth daily       traMADol (ULTRAM) 50 MG tablet Take 1  tablet (50 mg) by mouth every 6 hours as needed for moderate pain 30 tablet 0     ROS:  Limited secondary to cognitive impairment but today pt reports     Vitals:  /65   Pulse 79   Temp 98.5  F (36.9  C)   Resp 16   Ht 1.524 m (5')   Wt 71.7 kg (158 lb)   SpO2 93%   BMI 30.86 kg/m    Exam:  GENERAL APPEARANCE:  in no distress,   RESP:  Unlabored breathing. CTA b/l.   CV:  S1S2 audible, regular HR, no murmur appreciated.   ABDOMEN:  soft, NT/ND, BS audible.   M/S:   no joint deformity noted on observation.   SKIN:  No rash noted on observation  NEURO:   No NFD appreciated on observation.   PSYCH:  affect and mood normal, pleasantly disoriented.       Lab/Diagnostic data: Reviewed in the chart and EHR.      ASSESSMENT/PLAN:  (S72.22XD) Closed displaced subtrochanteric fracture of left femur with routine healing, subsequent encounter  (primary encounter diagnosis)  (Z98.890,  Z87.81) S/P ORIF (open reduction internal fixation) fracture  (R53.81) Physical deconditioning  -- Analgesia optimal with the current regimen. Continue present plan and medications.  - Followed by Orthopedic Team. Follow on the recommendations / instructions.   - DVT prophylaxis according to Orthopedic team recommendations  - Started rehab program, making a progress, continue until desired goal is achieved.       (F03.C18) Severe dementia with other behavioral disturbance, unspecified dementia type (H)  4/24: Seroquel 6.25 mg qid  Scheduled and [place] were discontinued last week due to lethargy, started on Seroquel 6.25 mg  Tid prn x 14 days.   4/30: very physically aggressive  behavior and cares could not be completed. PRN Seroquel was not given, query 2/2 behaviors.   - will scheduled risperidone 0.25 mg bid for 1-2 weeks, then slowly GDR.   -will change melatonin from prn to scheduled and increase dose to 5 mg.          (D50.0) Blood loss anemia  (D72.829) Leukocytosis, unspecified type  (D75.839) Thrombocytosis  4/26:  Leukocytosis  And thrombocytosis, afebrile, asymptomatic. UA negative for UTI.   -today leukocytes 12 K, trending down, Hb stable. And thrombocytosis resovled. Rbc continued to be low. Pt is clinically stable.     CKD3a, GFR 45-59 ml/min (H):  - renal function improving.   - - Avoid nephrotoxic  medications. Renally dose medications. Monitor electrolytes, and dehydration status        ORDER:  - schedule risperidone 0.25 mg bid for 2 weeks, then reduce to once daily at HS  -  change melatonin from prn to scheduled and increase dose to 5 mg.    - orders faxed to the facility today after hours.         Electronically signed by:  Kobe Montenegro MD       Sincerely,        Kobe Montenegro MD

## 2023-05-03 NOTE — LETTER
5/3/2023        RE: Shanta Tello  [facility]  [address]  [place] [address]        [facility] GERIATRIC SERVICES DISCHARGE SUMMARY    PATIENT'S NAME: Shanta Tello  YOB: 1940  MEDICAL RECORD NUMBER:  [id number]  Place of Service where encounter took place:  [facility] (TCU/SNF) [4000]    PRIMARY CARE PROVIDER AND CLINIC RESPONSIBLE AFTER TRANSFER: Yamel Manzanares [address] [facility] [address] / [facility] [address]    assisted living     Transferring providers: Lisa Lu NP / Kobe Montenegro MD  Recent Hospitalization/ED:  [facility]. Hospital stay 4/15/23 through 4/20/23.  Date of SNF Admission: 4/20  Date of SNF (anticipated) Discharge: 5/5  Discharged to: new assisted living for patient   Cognitive Scores: not available  Physical Function: ambulates with 2 wheeled walker. Requires wheelchair  DME: wheelchair.     CODE STATUS/ADVANCE DIRECTIVES DISCUSSION:  Prior   ALLERGIES: Alendronate and Calcitonin    DISCHARGE DIAGNOSIS/NURSING FACILITY COURSE:   4/24 - discontinue meclizine, tizanidine, prn tylenol, ramelteon  - quetiapine 5.25 mg po TID PRN x 14 days  acet 1,000 mg TID dx pain  - melatonin 3 mg po at HS PRN   - labs bmp, cbc on 4/26 4/26 UA/UC push fluids 250 ml TID, repeat BMP and CBC 5/1 5/1 - schedule risperidone 0.25 mg bid for 2 weeks, then reduce to once daily at HS  -  change melatonin from prn to scheduled and increase dose to 5 mg.      Closed displaced subtrochanteric fracture of left femur with routine healing, subsequent encounter  S/P ORIF (open reduction internal fixation) fracture  Physical deconditioning  Generalized muscle weakness  New fracture S/P ORIF on 4/17/23.  Requires 2 to assist with transfers now, unable to verbalize pain control issues.    - continue scheduled tylenol.   - continue DVT prophylaxis with aspirin 81 BID x 30 days per ortho  -follow up with Orthopedics as directed.  -Home care  Physical Therapy / Ocupational Therapy    -SW to assist with discharge planning      Anemia due to blood loss, acute  Baseline hemoglobin 12-13, down to 8.6 after fall and surgery. Now improving up to hgb 9.7.   - monitor PRN with follow up visit    Severe dementia with other behavioral disturbance, unspecified dementia type (H)  Insomnia, unspecified type  Delirium  Anxiety  Depression, unspecified depression type  Patient with known history of dementia, on low dose donepezil, buspirone, sertraline. She was started on low dose scheduled quetiapine, as well as ramelteon at hs.    - quetiapine was changed to be given as needed due to somnulence and ramelteon was discontinued.  -she continued to have anxious behaviors and risperdal nad melatonin was added on 5/1.   - she will need to follow up with her PCP for ongoing spych medication adjustments.   -continue to meet patients needs and continue supportive care.     Bilateral lower extremity edema  Minimal edema today, controlled with use of lasix 40 mg daily.      Hypothyroidism, unspecified type  Last TSH 10.02 on 4/16/23, on levothyroxine 50.   - no changes. Recheck PRN.     PAST MEDICAL HISTORY:  has a past medical history of Bilateral lower extremity edema, Dementia (H), Hyperlipidemia, and Hypothyroidism.    DISCHARGE MEDICATIONS:  Current Outpatient Medications   Medication Sig Dispense Refill     acetaminophen (TYLENOL) 500 MG tablet Take 1,000 mg by mouth 3 times daily       aspirin (ASA) 81 MG EC tablet Take 1 tablet (81 mg) by mouth 2 times daily 60 tablet 0     busPIRone (BUSPAR) 7.5 MG tablet Take 7.5 mg by mouth At Bedtime       calcium carbonate-vitamin D (OSCAL) 500-5 MG-MCG tablet Take 1 tablet by mouth daily       donepezil (ARICEPT) 5 MG ODT Take 2.5 mg by mouth daily       furosemide (LASIX) 40 MG tablet Take 40 mg by mouth daily       levothyroxine (SYNTHROID/LEVOTHROID) 50 MCG tablet Take 50 mcg by mouth daily       multivitamin, therapeutic  (THERA-VIT) TABS tablet Take 1 tablet by mouth daily       nystatin (MYCOSTATIN) 261037 UNIT/GM external powder Apply topically 2 times daily Wash and dry area under breasts before applying powder       ondansetron (ZOFRAN ODT) 4 MG disintegrating tablet [ONDANSETRON (ZOFRAN ODT) 4 MG DISINTEGRATING TABLET] Take 1 tablet (4 mg total) by mouth every 8 (eight) hours as needed for nausea. 30 tablet 0     QUEtiapine (SEROQUEL) 25 MG tablet Take 0.25 tablets (6.25 mg) by mouth 3 times daily as needed (aggressive behaviors/anxiety/self harm/)       risperiDONE (RISPERDAL) 0.25 MG tablet 0.25 mg bid x 2 weeks, then once daily at HS. PCP to follow and further GDR.       sertraline (ZOLOFT) 50 MG tablet Take 50 mg by mouth daily       traMADol (ULTRAM) 50 MG tablet Take 1 tablet (50 mg) by mouth every 6 hours as needed for moderate pain 30 tablet 0       MEDICATION CHANGES/RATIONALE:   Added prn serouel, added scheduled risperdal, added scheduled melatonin.      ROS:    4 point ROS including Respiratory, CV, GI and , other than that noted in the HPI,  is negative    Physical Exam:   Vitals: /65   Pulse 79   Temp 98.5  F (36.9  C)   Resp 16   Ht 1.524 m (5')   Wt 72.3 kg (159 lb 6.4 oz)   SpO2 93%   BMI 31.13 kg/m    BMI= Body mass index is 31.13 kg/m .  Alert. In no distress, sitting in wheelchair, does seem slightly anxious and stating that she wants to go home on the bus sitting outside.     DISCHARGE PLAN:  Occupational Therapy and Physical Therapy  Patient instructed to follow-up with:  PCP in 7 days      Current [place] scheduled appointments:  No future appointments.    Pending labs: none  SNF labs: resulted in [facility] epic.   Discharge Instructions:  - Ok to discharge to home with current medications and treatments  - Home Physical Therapy / Ocupational Therapy / RN / HHA  - Follow up with Primary care provider in 1 week    TOTAL DISCHARGE TIME:   Greater than 30 minutes  Electronically signed by: Lisa  CHRIS Lu    Documentation of Face to Face and Certification for Home Health Services  I certify that services are/were furnished while this patient was under the care of a physician and that a physician or an allowed non-physician practitioner (NPP), had a face-to-face encounter that meets the physician face-to-face encounter requirements. The encounter was in whole, or in part, related to the primary reason for home health. The patient is confined to his/her home and needs intermittent skilled nursing, physical therapy, speech-language pathology, or the continued need for occupational therapy. A plan of care has been established by a physician and is periodically reviewed by a physician.  Date of Face-to-Face Encounter:  5/3/2023.    I certify that, based on my findings, the following services are medically necessary home health services: Occupational Therapy and Physical Therapy.    My clinical findings support the need for the above skilled services because: Requires assistance of another person or specialized equipment to access medical services because patient: Is prone to wander/get lost without assistance...    Patient to re-establish plan of care with their PCP within 7-10 days after leaving the facility to reestablish care.  Medicare certified PECOS provider: Lisa Lu NP   Date: May 3, 2023      [facility] GERIATRICS  Face to Face and Medical Necessity Statement for DME Provider visit    Patient: Shanta Tello  Gender: female  YOB: 1940  [place] Medical Record Number: [id number]  Demographics:  [facility]  [address]  [facility] [address]  [phone] (home)   Social Security Number: [id number]  Primary Care Provider: Yamel Manzanarse  Insurance: Payor: [facility] / Plan: [facility] MEDICARE / Product Type: HMO /     HPI: Shanta Tello is a 82 year old (1940), who is being seen today for a face to face provider visit at [facility] (U/SNF) [4000].  Medical necessity statement for DME included.     This patient requires the following: DME Ordered and Medical Necessity Statement     Wheelchair Documentation  Size: 18 x18  Corresponding cushion: Yes: standard  Standard foot rests: Yes  Elevating leg rests: No  Arm rests: Yes: standard  Lap tray: No  Dose the patient use oxygen? No   Is the patient able to propel wheelchair? Yes If no why not?  And is there someone who can?  1. The patient has mobility limitations that impairs their ability to participate in one or more mobility related activities: Toileting, Feeding, Grooming and Bathing.  The wheelchair is suitable and necessary for use in the patient's home.  2. The patient's mobility limitations cannot be safely resolved by using a cane/walker:Yes    Reason why a cane or walker will not meet the patient's needs. (ie: balance, tolerance, level of assistance) balance, needs assistance with ambulation.   3. The patients home has adequate access to use a manual wheelchair:Yes  4. The use of a manual wheelchair on a regular basis will improve the patients ability to participate in mobility related ADL's at home:Yes  5. The patient is willing to use a manual wheelchair at home:Yes  6. The patient has adequate upper body strength and the mental capability to safely use a manual wheelchair and/or has a caregiver that is able to assist: Yes  7. Does the patient have a lower extremity injury or edema?No  Reason for Type of Wheelchair      Pt needing above DME with expected length of need of 99  months due to medical necessity associated with following diagnosis:     Closed fracture of neck of left femur with routine healing  Severe dementia with other behavioral disturbance, unspecified dementia type (H)      Past Medical History:   has a past medical history of Bilateral lower extremity edema, Dementia (H), Hyperlipidemia, and Hypothyroidism.    Review of Systems:  Unobtainable secondary to cognitive impairment.      Exam:  Vitals: /65   Pulse 79   Temp 98.5  F (36.9  C)   Resp 16   Ht 1.524 m (5')   Wt 72.3 kg (159 lb 6.4 oz)   SpO2 93%   BMI 31.13 kg/m    BMI: Body mass index is 31.13 kg/m .  Alert. In no distress. Appears anxious.     Assessment/Plan:  1. Closed fracture of neck of left femur with routine healing    2. Severe dementia with other behavioral disturbance, unspecified dementia type (H)        Orders:  1. Facility staff/TC to contact DME company to get their order form for provider to fill out    ELECTRONICALLY SIGNED BY [employer] CERTIFIED PROVIDER: Lisa Lu NP   NPI: [id number]  [facility]  [address] [place] [address]          Sincerely,        Lisa Lu NP

## 2023-05-03 NOTE — PROGRESS NOTES
[facility] GERIATRICS  Face to Face and Medical Necessity Statement for DME Provider visit    Patient: Shanta Tello  Gender: female  YOB: 1940  [place] Medical Record Number: [id number]  Demographics:  [facility]  [address] [place] [address]  [phone] (home)   Social Security Number: [id number]  Primary Care Provider: Yamel Manzanares  Insurance: Payor: [facility] / Plan: [facility] MEDICARE / Product Type: HMO /     HPI: Shanta Tello is a 82 year old (1940), who is being seen today for a face to face provider visit at [facility] (TCU/SNF) [4000]. Medical necessity statement for DME included.     This patient requires the following: DME Ordered and Medical Necessity Statement     Wheelchair Documentation  Size: 18 x18  Corresponding cushion: Yes: standard  Standard foot rests: Yes  Elevating leg rests: No  Arm rests: Yes: standard  Lap tray: No  Dose the patient use oxygen? No   Is the patient able to propel wheelchair? Yes If no why not?  And is there someone who can?  1. The patient has mobility limitations that impairs their ability to participate in one or more mobility related activities: Toileting, Feeding, Grooming and Bathing.  The wheelchair is suitable and necessary for use in the patient's home.  2. The patient's mobility limitations cannot be safely resolved by using a cane/walker:Yes    Reason why a cane or walker will not meet the patient's needs. (ie: balance, tolerance, level of assistance) balance, needs assistance with ambulation.   3. The patients home has adequate access to use a manual wheelchair:Yes  4. The use of a manual wheelchair on a regular basis will improve the patients ability to participate in mobility related ADL's at home:Yes  5. The patient is willing to use a manual wheelchair at home:Yes  6. The patient has adequate upper body strength and the mental capability to safely use a manual wheelchair and/or has a caregiver  that is able to assist: Yes  7. Does the patient have a lower extremity injury or edema?No  Reason for Type of Wheelchair      Pt needing above DME with expected length of need of 99  months due to medical necessity associated with following diagnosis:     Closed fracture of neck of left femur with routine healing  Severe dementia with other behavioral disturbance, unspecified dementia type (H)      Past Medical History:   has a past medical history of Bilateral lower extremity edema, Dementia (H), Hyperlipidemia, and Hypothyroidism.    Review of Systems:  Unobtainable secondary to cognitive impairment.     Exam:  Vitals: /65   Pulse 79   Temp 98.5  F (36.9  C)   Resp 16   Ht 1.524 m (5')   Wt 72.3 kg (159 lb 6.4 oz)   SpO2 93%   BMI 31.13 kg/m    BMI: Body mass index is 31.13 kg/m .  Alert. In no distress. Appears anxious.     Assessment/Plan:  1. Closed fracture of neck of left femur with routine healing    2. Severe dementia with other behavioral disturbance, unspecified dementia type (H)        Orders:  1. Facility staff/TC to contact DME company to get their order form for provider to fill out    ELECTRONICALLY SIGNED BY [place] CERTIFIED PROVIDER: Lisa Lu NP   NPI: [id number]  [facility] GERIATRICS  [address] [place] [address]

## 2023-05-03 NOTE — PROGRESS NOTES
[facility] GERIATRIC SERVICES DISCHARGE SUMMARY    PATIENT'S NAME: Shanta Tello  YOB: 1940  MEDICAL RECORD NUMBER:  [id number]  Place of Service where encounter took place:  [facility] (U/SNF) [4000]    PRIMARY CARE PROVIDER AND CLINIC RESPONSIBLE AFTER TRANSFER: Yamel Manzanares [address] [facility] [address] / [facility] [address]    assisted living     Transferring providers: Lisa Lu NP / Kobe Montenegro MD  Recent Hospitalization/ED:  [facility]. Hospital stay 4/15/23 through 4/20/23.  Date of SNF Admission: 4/20  Date of SNF (anticipated) Discharge: 5/5  Discharged to: new assisted living for patient   Cognitive Scores: not available  Physical Function: ambulates with 2 wheeled walker. Requires wheelchair  DME: wheelchair.     CODE STATUS/ADVANCE DIRECTIVES DISCUSSION:  Prior   ALLERGIES: Alendronate and Calcitonin    DISCHARGE DIAGNOSIS/NURSING FACILITY COURSE:   4/24 - discontinue meclizine, tizanidine, prn tylenol, ramelteon  - quetiapine 5.25 mg po TID PRN x 14 days  acet 1,000 mg TID dx pain  - melatonin 3 mg po at HS PRN   - labs bmp, cbc on 4/26 4/26 UA/UC push fluids 250 ml TID, repeat BMP and CBC 5/1 5/1 - schedule risperidone 0.25 mg bid for 2 weeks, then reduce to once daily at HS  -  change melatonin from prn to scheduled and increase dose to 5 mg.      Closed displaced subtrochanteric fracture of left femur with routine healing, subsequent encounter  S/P ORIF (open reduction internal fixation) fracture  Physical deconditioning  Generalized muscle weakness  New fracture S/P ORIF on 4/17/23.  Requires 2 to assist with transfers now, unable to verbalize pain control issues.    - continue scheduled tylenol.   - continue DVT prophylaxis with aspirin 81 BID x 30 days per ortho  -follow up with Orthopedics as directed.  -Home care Physical Therapy / Ocupational Therapy    -SW to assist with discharge planning      Anemia due to blood loss,  acute  Baseline hemoglobin 12-13, down to 8.6 after fall and surgery. Now improving up to hgb 9.7.   - monitor PRN with follow up visit    Severe dementia with other behavioral disturbance, unspecified dementia type (H)  Insomnia, unspecified type  Delirium  Anxiety  Depression, unspecified depression type  Patient with known history of dementia, on low dose donepezil, buspirone, sertraline. She was started on low dose scheduled quetiapine, as well as ramelteon at hs.    - quetiapine was changed to be given as needed due to somnulence and ramelteon was discontinued.  -she continued to have anxious behaviors and risperdal nad melatonin was added on 5/1.   - she will need to follow up with her PCP for ongoing spych medication adjustments.   -continue to meet patients needs and continue supportive care.     Bilateral lower extremity edema  Minimal edema today, controlled with use of lasix 40 mg daily.      Hypothyroidism, unspecified type  Last TSH 10.02 on 4/16/23, on levothyroxine 50.   - no changes. Recheck PRN.     PAST MEDICAL HISTORY:  has a past medical history of Bilateral lower extremity edema, Dementia (H), Hyperlipidemia, and Hypothyroidism.    DISCHARGE MEDICATIONS:  Current Outpatient Medications   Medication Sig Dispense Refill     acetaminophen (TYLENOL) 500 MG tablet Take 1,000 mg by mouth 3 times daily       aspirin (ASA) 81 MG EC tablet Take 1 tablet (81 mg) by mouth 2 times daily 60 tablet 0     busPIRone (BUSPAR) 7.5 MG tablet Take 7.5 mg by mouth At Bedtime       calcium carbonate-vitamin D (OSCAL) 500-5 MG-MCG tablet Take 1 tablet by mouth daily       donepezil (ARICEPT) 5 MG ODT Take 2.5 mg by mouth daily       furosemide (LASIX) 40 MG tablet Take 40 mg by mouth daily       levothyroxine (SYNTHROID/LEVOTHROID) 50 MCG tablet Take 50 mcg by mouth daily       multivitamin, therapeutic (THERA-VIT) TABS tablet Take 1 tablet by mouth daily       nystatin (MYCOSTATIN) 109507 UNIT/GM external powder  Apply topically 2 times daily Wash and dry area under breasts before applying powder       ondansetron (ZOFRAN ODT) 4 MG disintegrating tablet [ONDANSETRON (ZOFRAN ODT) 4 MG DISINTEGRATING TABLET] Take 1 tablet (4 mg total) by mouth every 8 (eight) hours as needed for nausea. 30 tablet 0     QUEtiapine (SEROQUEL) 25 MG tablet Take 0.25 tablets (6.25 mg) by mouth 3 times daily as needed (aggressive behaviors/anxiety/self harm/)       risperiDONE (RISPERDAL) 0.25 MG tablet 0.25 mg bid x 2 weeks, then once daily at HS. PCP to follow and further GDR.       sertraline (ZOLOFT) 50 MG tablet Take 50 mg by mouth daily       traMADol (ULTRAM) 50 MG tablet Take 1 tablet (50 mg) by mouth every 6 hours as needed for moderate pain 30 tablet 0       MEDICATION CHANGES/RATIONALE:   Added prn serouel, added scheduled risperdal, added scheduled melatonin.      ROS:    4 point ROS including Respiratory, CV, GI and , other than that noted in the HPI,  is negative    Physical Exam:   Vitals: /65   Pulse 79   Temp 98.5  F (36.9  C)   Resp 16   Ht 1.524 m (5')   Wt 72.3 kg (159 lb 6.4 oz)   SpO2 93%   BMI 31.13 kg/m    BMI= Body mass index is 31.13 kg/m .  Alert. In no distress, sitting in wheelchair, does seem slightly anxious and stating that she wants to go home on the bus sitting outside.     DISCHARGE PLAN:  Occupational Therapy and Physical Therapy  Patient instructed to follow-up with:  PCP in 7 days      Current [place] scheduled appointments:  No future appointments.    Pending labs: none  SNF labs: resulted in [facility] epic.   Discharge Instructions:  - Ok to discharge to home with current medications and treatments  - Home Physical Therapy / Ocupational Therapy / RN / HHA  - Follow up with Primary care provider in 1 week    TOTAL DISCHARGE TIME:   Greater than 30 minutes  Electronically signed by: Lisa Lu NP    Documentation of Face to Face and Certification for Home Health Services  I certify that services  are/were furnished while this patient was under the care of a physician and that a physician or an allowed non-physician practitioner (NPP), had a face-to-face encounter that meets the physician face-to-face encounter requirements. The encounter was in whole, or in part, related to the primary reason for home health. The patient is confined to his/her home and needs intermittent skilled nursing, physical therapy, speech-language pathology, or the continued need for occupational therapy. A plan of care has been established by a physician and is periodically reviewed by a physician.  Date of Face-to-Face Encounter:  5/3/2023.    I certify that, based on my findings, the following services are medically necessary home health services: Occupational Therapy and Physical Therapy.    My clinical findings support the need for the above skilled services because: Requires assistance of another person or specialized equipment to access medical services because patient: Is prone to wander/get lost without assistance...    Patient to re-establish plan of care with their PCP within 7-10 days after leaving the facility to reestablish care.  Medicare certified PECOS provider: Lisa Lu NP   Date: May 3, 2023

## 2023-10-03 PROBLEM — S72.102S: Status: ACTIVE | Noted: 2023-01-01

## 2023-10-03 NOTE — ED NOTES
Bed: JNED-33  Expected date:   Expected time:   Means of arrival:   Comments:  Boardshaista MCDONALD R.D.

## 2023-10-03 NOTE — ED NOTES
Bed: JNJEVONJUAN MIGUEL  Expected date:   Expected time:   Means of arrival:   Comments:  [facility]- 84yo Leg swelling\pain

## 2023-10-03 NOTE — ED TRIAGE NOTES
"Patient to triage via [facility]. Per medic report,  patient from [facility]. Here for pain in left leg. Did have a fracture of  left femur in April,  repaired and has been able to ambulate with leg. Two weeks ago she complained of pain at left leg, not bearing weight. Xray was done one week ago, negative for fracture. Patient to ER for continual pain at left leg.    Patient confused, hitting out with any stimuli, \"I want  to go home\" daughter  came shortly after she arrived, patient calmer with daughter at bedside.  No know trauma         "

## 2023-10-03 NOTE — ED PROVIDER NOTES
EMERGENCY DEPARTMENT ENCOUNTER   NAME: Shanta Tello ; AGE: 83 year old female ; YOB: 1940 ; MRN: [id number] ; PCP: Yamel Manzanares   ED PROVIDER: Shirley Olsen PA-C    Evaluation Date & Time:   10/3/2023 11:01 AM    CHIEF COMPLAINT:  Leg Pain      ED COURSE, MEDICAL DECISION MAKING, PLAN   Pertinent Labs & Imaging studies reviewed. (See chart for details)     ED course:   11:28 AM: seen and evaluated patient independently. Placed orders. Discussed case with Dr. Randhawa.   11:56:   1:06 PM: Family updated on imaging results. Awaiting call back from [facility] to discuss plan.   1:19 PM: [facility] recommending admission for surgical revision.   1:42 PM: Spoke with hospitalist regarding admission plan.     MDM:   Patient is an 83-year-old female presenting to the ED by EMS for left leg pain over the last 2 weeks.  Daughter reports patient has been unwilling/unable to ambulate because of it and has been completely wheelchair-bound.  Transfers at the care home have been very difficult per patient's daughter.    Patient had a closed fracture of her left hip back on April 15, 2023.  This emergency medicine note was reviewed in epic.  Patient's daughter notes that she healed well after this and was back to being independently ambulatory.    Daughter notes that patient was seen by orthopedics 1 week ago and had x-rays of her left hip and knee completed that were reportedly unremarkable.  They did a corticosteroid injection into the left knee, but she continues to worsen.    Here on exam patient is agitated.  Baseline dementia.  She does yell out in pain trying back me away while palpating the left upper leg and knee.  No appreciable swelling or bruising.  Rest of the exam is unremarkable.    Vitals reviewed and are within normal limits.     Differential diagnoses include, but not limited to, occult left lower extremity fracture including knee, femur, hip/pelvis.  Malalignment/complication of  "surgical hardware.  Acute DVT.  Compartment syndrome.  Septic joint.  Sciatica.    Work up included xrays of the left knee, femur, hip/pelvis.   A CRP and ESR were added on at the request of orthopedics.  EKG, CBC, and BMP collected at the request of the hospitalist.     Work up revealed a medullary morena fracture as well as a screw fracture in the distal femoral metaphysis.  The proximal femur fracture is incompletely healed with the lateral fracture apex angulated and a displaced lesser trochanteric fragment.    Patient was given 50mg of oral Tramadol for symptoms.  Daughter reports that it has made her a bit more \"loopy,\" but has seemed to lessen her agitation.     Symptoms and work up most consistent with complication of surgical hardware of the left distal femur.     Plan is to admit patient for surgical revision based on orthopedic recommendation.      History:  Supplemental history from: Family Member/Significant Other and EMS  External Record(s) reviewed: Documented in chart, if applicable.    Work Up:  Chart documentation includes differential considered and any EKGs or imaging independently interpreted by provider, where specified.  In additional to work up documented, I considered the following work up: Documented in chart, if applicable.    External consultation:  Discussion of management with another provider: Documented in chart, if applicable and Orthopedics    Complicating factors:  Care impacted by chronic illness: Dementia  Care affected by social determinants of health: N/A    Disposition considerations: Admit.      FINAL IMPRESSION:    ICD-10-CM    1. Closed fracture of trochanter of left femur, sequela  S72.102S             MEDICATIONS GIVEN IN THE EMERGENCY DEPARTMENT:  Medications   traMADol (ULTRAM) tablet 50 mg (50 mg Oral $Given 10/3/23 9256)         NEW PRESCRIPTIONS STARTED AT TODAY'S ED VISIT:  New Prescriptions    No medications on file       Discussed the results of all the tests and the " disposition with patient and family/guardians.   All questions were answered.   The patient or family/guardians acknowledged understanding and was agreeable with the care plan.    HISTORY OF PRESENT ILLNESS   Patient information was obtained from: Patient's daughter, patient has advanced dementia and is a poor historian    Shanta Tello is a 83 year old female with a pertinent history of left hip fracture in April 2023, advanced dementia, hypothyroidism, hyperlipidemia, DNR status who presents to the ED by EMS for evaluation of left leg pain x2 weeks.     Daughter reports that patient has been unwilling/unable to ambulate.  She is normally ambulatory independently, but has been lately wheelchair-bound over the last 2 weeks.    Daughter states that the care facility is having a really hard time with transfers.    Daughter states that patient used to get up and go to the bathroom on her own, but now has been wearing depends and has been incontinent.    No noted constitutional signs or symptoms including fevers.    No other concerns.      MEDICAL HISTORY     Past Medical History:   Diagnosis Date    Bilateral lower extremity edema     Dementia (H)     Hyperlipidemia     Hypothyroidism        Past Surgical History:   Procedure Laterality Date    Meniscal tear repair      OPEN REDUCTION INTERNAL FIXATION HIP NAILING Left 4/16/2023    Procedure: LEFT INTERNAL FIXATION, FRACTURE, TROCHANTERIC, HIP, USING PINS OR RODS;  Surgeon: Brian Arredondo MD;  Location: [facility] Main OR       Family History   Problem Relation Age of Onset    Heart Failure Mother        Social History     Tobacco Use    Smoking status: Never   Substance Use Topics    Alcohol use: Not Currently       acetaminophen (TYLENOL) 500 MG tablet  busPIRone (BUSPAR) 7.5 MG tablet  calcium carbonate-vitamin D (OSCAL) 500-5 MG-MCG tablet  donepezil (ARICEPT) 10 MG ODT  donepezil (ARICEPT) 5 MG ODT  furosemide (LASIX) 40 MG tablet  levothyroxine  (SYNTHROID/LEVOTHROID) 75 MCG tablet  melatonin 5 MG tablet  multivitamin, therapeutic (THERA-VIT) TABS tablet  nystatin (MYCOSTATIN) 843619 UNIT/GM external powder  ondansetron (ZOFRAN ODT) 4 MG disintegrating tablet  risperiDONE (RISPERDAL) 0.25 MG tablet  sertraline (ZOLOFT) 50 MG tablet  traMADol (ULTRAM) 50 MG tablet  trolamine salicylate (ASPERCREME) 10 % external cream        PHYSICAL EXAM     First Vitals:  Patient Vitals for the past 24 hrs:   BP Temp Temp src Pulse SpO2 Height Weight   10/03/23 1102 120/58 97  F (36.1  C) Axillary 88 97 % 1.524 m (5') 72.6 kg (160 lb)       PHYSICAL EXAM:   Constitutional: Agitated.  Eyes: PERRL   Neck: FROM  Cardio: RRR. HR 88  Pulmonary: Lungs CTA b/l. Oxygenating well. No labored breathing.   Gastrointestinal: No apparent abdominal pain with palpation.   Upper extremities: FROM  Lower extremities: Pain with palpating in the left upper leg and around the knee. No appreciable swelling. No bruising. No redness. Distal pulses strong and equal b/l.   Skin: Natural color. Warm.   Neuro: Confused.   Psych: Agitated.       RESULTS     LAB:  All pertinent labs reviewed and interpreted  Labs Ordered and Resulted from Time of ED Arrival to Time of ED Departure - No data to display    RADIOLOGY:  US Lower Extremity Venous Duplex Left   Final Result   IMPRESSION:   1.  No deep venous thrombosis in the visualized left lower extremity.      XR Knee Left 1/2 Views   Final Result   IMPRESSION: Status post ORIF of the proximal left femur fracture. The intramedullary morena is fractured proximally and one of the screws in the distal femoral metaphysis is fractured. The proximal femur fracture is chronic but incompletely healed with    lateral fracture apex angulation and a displaced lesser trochanteric fragment. Normal joint alignment. Degenerative changes throughout the pelvis and lower lumbar spine. Osteopenia.      XR Pelvis 1/2 Views   Final Result   IMPRESSION: Status post ORIF of the  proximal left femur fracture. The intramedullary morena is fractured proximally and one of the screws in the distal femoral metaphysis is fractured. The proximal femur fracture is chronic but incompletely healed with    lateral fracture apex angulation and a displaced lesser trochanteric fragment. Normal joint alignment. Degenerative changes throughout the pelvis and lower lumbar spine. Osteopenia.      XR Femur Left 2 Views   Final Result   IMPRESSION: Status post ORIF of the proximal left femur fracture. The intramedullary morena is fractured proximally and one of the screws in the distal femoral metaphysis is fractured. The proximal femur fracture is chronic but incompletely healed with    lateral fracture apex angulation and a displaced lesser trochanteric fragment. Normal joint alignment. Degenerative changes throughout the pelvis and lower lumbar spine. Osteopenia.          ECG:  NA      PROCEDURES   RALF Olsen PA-C  Emergency Medicine   [facility] EMERGENCY DEPARTMENT

## 2023-10-03 NOTE — H&P
[facility]    History and Physical - Hospitalist Service       Date of Admission:  10/3/2023    Assessment & Plan      Shanta Tello is a 83 year old female with past medical history of advanced dementia, hypothyroidism, hyperlipidemia, chronic lower extremity edema, left femur fracture s/p cephalomedullary nailing on 4/16/2023 was brought to ED for evaluation of worsening left leg pain for 2 weeks, imaging reported intramedullary morena is fractured proximally and one of the screws in the distal femur metaphysis is fractured.  ED provider discussed with orthopedic team and they recommended admission for surgical revision.    History of left femur # s/p cephalomedullary nailing on 4/16/2023;  Acute LLE pain, x-ray reported intramedullary morena is fractured;  -- Per patient's daughter, patient was recovering well after surgery, walking independently without using a cane until 2 weeks ago when started complaining of left leg pain, no reported trauma/fall  -- In ED, x-ray reported intramedullary morena is fractured proximally and one of the screw in the distal femoral metaphysis is fractured  -- ED provider discussed with orthopedic team and they recommended admission for surgical revision  -- Continue scheduled Tylenol, as needed PTA tramadol, dose decreased from 50 to 25 mg every 6 hours due to concern for sedation    Preop evaluation;  -- Per patient daughter, patient was walking independently until 2 weeks ago when she started having left leg pain.  Per patient's daughter, no reported recent chest pain, shortness of breath, no history of CAD, heart failure, DM, CKD.  EKG seems sinus rhythm. Patient's would be moderate surgical risk.    History of dementia with behavioral issues;  -- Per patient daughter patient does have some sundowning  -- Continue PTA BuSpar, Aricept, Risperdal, Zoloft  -- Delirium protocol.  Per daughter, Seroquel had sedative effect in the past and would like to  avoid    History of hypothyroidism;  -- On 8/24, TSH 9.82 (before that around 15), pharmacy reported levothyroxine dose increased.  Continue recently increased dose.  Recheck TSH in couple of weeks    Chronic lower extremity edema;  -- Patient's daughter at bedside, reported a stable to improving.  Also reported no history of heart failure  -- Continue PTA Lasix.    Mild leukocytosis; likely due to stress  -- No reported recent febrile illness, no recent complaint of respiratory/GI/ symptoms per patient's daughter  -- Trend       Diet: Combination Diet Regular Diet Adult    DVT Prophylaxis: No pharmacological agent as anticipating surgery and post surgery defer to orthopedic team  Dotson Catheter: Not present  Lines: None     Cardiac Monitoring: None  Code Status: No CPR- Do NOT Intubate, confirmed with patient's daughter    Clinically Significant Risk Factors Present on Admission                       # Obesity: Estimated body mass index is 31.25 kg/m  as calculated from the following:    Height as of this encounter: 1.524 m (5').    Weight as of this encounter: 72.6 kg (160 lb).              Disposition Plan      Expected Discharge Date: 10/05/2023                  Viral AMOS MD  Hospitalist Service  [facility]  Securely message with [employer] (air [employer]) (more info)  Text page via [facility] Paging/Directory     ______________________________________________________________________    Chief Complaint   Left leg pain    History is obtained from patient's daughter as patient has advanced dementia and cannot provide reliable history.    History of Present Illness   Shanta Tello is a 83 year old female with past medical history of advanced dementia, hypothyroidism, hyperlipidemia, chronic lower extremity edema, left femur fracture s/p cephalomedullary nailing on 4/16/2023 was brought to ED for evaluation of worsening left leg pain for 2 weeks, imaging reported intramedullary morena is fractured proximally and one  of the screws in the distal femur metaphysis is fractured.  ED provider discussed with orthopedic team and they recommended admission for surgical revision.    Per patient's daughter, patient was recovering well after surgery, walking independently without using a cane until 2 weeks ago when started complaining of left leg pain, no reported trauma/fall.  Patient had a follow-up with orthopedics about a week prior to this ED visit, received injection without sufficient relief.  Patient continued to have ongoing pain and refuses to put any weight and walk and now wheelchair-bound since last 2 weeks.    Per patient's daughter, patient can express her symptoms.  Reported no recent complaint of shortness of breath, chest pain, dizziness.  No recent febrile illness, GI/ symptoms.  No history of coronary artery disease, heart failure.     Past Medical History    Past Medical History:   Diagnosis Date    Bilateral lower extremity edema     Dementia (H)     Hyperlipidemia     Hypothyroidism        Past Surgical History   Past Surgical History:   Procedure Laterality Date    Meniscal tear repair      OPEN REDUCTION INTERNAL FIXATION HIP NAILING Left 4/16/2023    Procedure: LEFT INTERNAL FIXATION, FRACTURE, TROCHANTERIC, HIP, USING PINS OR RODS;  Surgeon: Brian Arredondo MD;  Location: [facility] Main OR       Prior to Admission Medications   Prior to Admission Medications   Prescriptions Last Dose Informant Patient Reported? Taking?   acetaminophen (TYLENOL) 500 MG tablet 10/3/2023 at 0900  Yes Yes   Sig: Take 1,000 mg by mouth 3 times daily   busPIRone (BUSPAR) 7.5 MG tablet 10/2/2023 at 2030  Yes Yes   Sig: Take 7.5 mg by mouth At Bedtime   calcium carbonate-vitamin D (OSCAL) 500-5 MG-MCG tablet 10/3/2023 at 0900  Yes Yes   Sig: Take 1 tablet by mouth daily   donepezil (ARICEPT) 10 MG ODT 10/2/2023 at 2100  Yes Yes   Sig: Take 10 mg by mouth At Bedtime   donepezil (ARICEPT) 5 MG ODT 10/3/2023 at 0900  Yes Yes   Sig:  Take 2.5 mg by mouth every morning   furosemide (LASIX) 40 MG tablet 10/3/2023 at 0900  Yes Yes   Sig: Take 40 mg by mouth daily   levothyroxine (SYNTHROID/LEVOTHROID) 75 MCG tablet 10/3/2023 at 0800  Yes Yes   Sig: Take 75 mcg by mouth daily   melatonin 5 MG tablet 10/2/2023 at 2030  Yes Yes   Sig: Take 5 mg by mouth At Bedtime   multivitamin, therapeutic (THERA-VIT) TABS tablet 10/3/2023 at 0900  Yes Yes   Sig: Take 1 tablet by mouth daily   nystatin (MYCOSTATIN) 526467 UNIT/GM external powder 10/3/2023 at 0900  Yes Yes   Sig: Apply topically 2 times daily Wash and dry area under breasts before applying powder   ondansetron (ZOFRAN ODT) 4 MG disintegrating tablet Past Week at PRN  No Yes   Sig: [ONDANSETRON (ZOFRAN ODT) 4 MG DISINTEGRATING TABLET] Take 1 tablet (4 mg total) by mouth every 8 (eight) hours as needed for nausea.   risperiDONE (RISPERDAL) 0.25 MG tablet 10/2/2023 at 2100  Yes Yes   Sig: Take 0.25 mg by mouth At Bedtime PCP to follow and further GDR.   sertraline (ZOLOFT) 50 MG tablet 10/3/2023 at 0900  Yes Yes   Sig: Take 50 mg by mouth daily   traMADol (ULTRAM) 50 MG tablet 9/28/2023 at PRN  No Yes   Sig: Take 1 tablet (50 mg) by mouth every 6 hours as needed for moderate pain   trolamine salicylate (ASPERCREME) 10 % external cream 10/3/2023 at 0900  Yes Yes   Sig: Apply topically 2 times daily      Facility-Administered Medications: None           Physical Exam   Vital Signs: Temp: 97  F (36.1  C) Temp src: Axillary BP: 128/69 Pulse: 76     SpO2: 96 %      Weight: 160 lbs 0 oz        Medical Decision Making             Data

## 2023-10-03 NOTE — ED PROVIDER NOTES
Emergency Department Midlevel Supervisory Note     I personally saw the patient and performed a substantive portion of the visit including all aspects of the medical decision making.    ED Course:  11:25 AM Shirley Olsen PA-C staffed patient with me. I agree with their assessment and plan of management, and I will see the patient.  11:30 AM I met with the patient to introduce myself, gather additional history, perform my initial exam, and discuss the plan.   11:49 AM Spoke with [facility] Care Manager      Brief HPI:     Shanta Tello is a 83 year old female who presents for evaluation of left upper leg and knee pain persisting for 2 weeks. Daughter denies any injury, falls, or trauma. Patient was seen with [facility] 1 week ago, was given an injectioin without sufficient relief. XR of hip knee was reassuring, it was also noted that her previous left hip fracture from April was healing well. Daughter notes patient refuses to put any weight on leg. Normally able to walk, but now wheel chair bound. No urinary issues.     Patient comes from memory care facility for advanced dementia        I, Niki Jorge, am serving as a scribe to document services personally performed by Xavier Randhawa MD, based on my observations and the provider's statements to me.   I, Xavier Randhawa MD attest that Niki Jorge was acting in a scribe capacity, has observed my performance of the services and has documented them in accordance with my direction.    Brief Physical Exam: /69   Pulse 76   Temp 97  F (36.1  C) (Axillary)   Ht 1.524 m (5')   Wt 72.6 kg (160 lb)   SpO2 96%   BMI 31.25 kg/m    Constitutional:  Alert, in no acute distress  EYES: Conjunctivae clear  HENT:  Atraumatic, normocephalic  Respiratory:  Respirations even, unlabored, in no acute respiratory distress  Cardiovascular:  Regular rate and rhythm, good peripheral perfusion  GI: Soft, nondistended, nontender, no palpable masses, no rebound, no guarding    Musculoskeletal:  No edema. No cyanosis. Range of motion major extremities intact.    Integument: Warm, Dry, No erythema, No rash.   Neurologic:  Alert & oriented, no focal deficits noted  Psych: Normal mood and affect     MDM:  Again, patient is a 83-year-old female who presents with left leg pain.  Patient appears nontoxic with stable vital signs.  Patient has no hypoxia, fever or tachycardia.  Review the medical record showed patient had orthopedic surgery on 4/16/2023 through [facility] for left subtrochanteric hip fracture.  Since the repair patient has been doing well but 2 weeks ago noted more leg pain, no reports of any other falls or trauma.  She is afebrile with good distal sensation and pulses.  No joint swelling, erythema or warmth.  Considered occult fracture, DVT.  Nothing to suggest septic joint, cellulitis, necrotizing fasciitis, compartment syndrome, or other more malicious etiology of symptoms.  Patient initially had tramadol right after her surgery which she tolerated well but has been off of this medication due to the prescription running out.  We will obtain repeat plain films of the left hip, femur and knee as well as ultrasound imaging.  Patient was given a dose of tramadol here.      Medical Decision Making    History:  Supplemental history from: Documented in chart, if applicable and Family Member/Significant Other  External Record(s) reviewed: Documented in chart, if applicable.    Work Up:  Chart documentation includes differential considered and any EKGs or imaging independently interpreted by provider, where specified.  In additional to work up documented, I considered the following work up: Documented in chart, if applicable.    External consultation:  Discussion of management with another provider: Documented in chart, if applicable    Complicating factors:  Care impacted by chronic illness: Dementia  Care affected by social determinants of health: N/A    Disposition  considerations: Admit.             1. Closed fracture of trochanter of left femur, sequela        Labs and Imaging:  Results for orders placed or performed during the hospital encounter of 10/03/23   US Lower Extremity Venous Duplex Left    Impression    IMPRESSION:  1.  No deep venous thrombosis in the visualized left lower extremity.   XR Femur Left 2 Views    Impression    IMPRESSION: Status post ORIF of the proximal left femur fracture. The intramedullary morean is fractured proximally and one of the screws in the distal femoral metaphysis is fractured. The proximal femur fracture is chronic but incompletely healed with   lateral fracture apex angulation and a displaced lesser trochanteric fragment. Normal joint alignment. Degenerative changes throughout the pelvis and lower lumbar spine. Osteopenia.   XR Pelvis 1/2 Views    Impression    IMPRESSION: Status post ORIF of the proximal left femur fracture. The intramedullary morena is fractured proximally and one of the screws in the distal femoral metaphysis is fractured. The proximal femur fracture is chronic but incompletely healed with   lateral fracture apex angulation and a displaced lesser trochanteric fragment. Normal joint alignment. Degenerative changes throughout the pelvis and lower lumbar spine. Osteopenia.   XR Knee Left 1/2 Views    Impression    IMPRESSION: Status post ORIF of the proximal left femur fracture. The intramedullary morena is fractured proximally and one of the screws in the distal femoral metaphysis is fractured. The proximal femur fracture is chronic but incompletely healed with   lateral fracture apex angulation and a displaced lesser trochanteric fragment. Normal joint alignment. Degenerative changes throughout the pelvis and lower lumbar spine. Osteopenia.     I have reviewed the relevant laboratory and radiology studies    Procedures:  I was present for the key portions of this procedure: none    Xavier Randhawa MD  [facility]  [facility] EMERGENCY DEPARTMENT  [address] [place] [id number]  [phone]       Xavier Randhawa MD  10/03/23 3725

## 2023-10-03 NOTE — PHARMACY-ADMISSION MEDICATION HISTORY
Pharmacist Admission Medication History    Admission medication history is complete. The information provided in this note is only as accurate as the sources available at the time of the update.    Information Source(s): Facility ([facility]/) medication list/MAR via N/A    Pertinent Information: Donepezil 10 at bedtime as well as 2.5mg in the morning    Changes made to PTA medication list:  Added: Aspecreame  Deleted: Aspirin, seroquel  Changed: Levothyroxine dose increased from 50mcg.     Medication Affordability:unable to assess       Allergies reviewed with patient and updates made in EHR: unable to assess    Medication History Completed By: Yunior Stout RPH 10/3/2023 2:01 PM  Prior to Admission medications    Medication Sig Last Dose Taking? Auth Provider Long Term End Date   acetaminophen (TYLENOL) 500 MG tablet Take 1,000 mg by mouth 3 times daily 10/3/2023 at 0900 Yes Reported, Patient No    busPIRone (BUSPAR) 7.5 MG tablet Take 7.5 mg by mouth At Bedtime 10/2/2023 at 2030 Yes Unknown, Entered By History Yes    calcium carbonate-vitamin D (OSCAL) 500-5 MG-MCG tablet Take 1 tablet by mouth daily 10/3/2023 at 0900 Yes Unknown, Entered By History     donepezil (ARICEPT) 10 MG ODT Take 10 mg by mouth At Bedtime 10/2/2023 at 2100 Yes Unknown, Entered By History     donepezil (ARICEPT) 5 MG ODT Take 2.5 mg by mouth every morning 10/3/2023 at 0900 Yes Reported, Patient     furosemide (LASIX) 40 MG tablet Take 40 mg by mouth daily 10/3/2023 at 0900 Yes Unknown, Entered By History Yes    levothyroxine (SYNTHROID/LEVOTHROID) 75 MCG tablet Take 75 mcg by mouth daily 10/3/2023 at 0800 Yes Unknown, Entered By History Yes    melatonin 5 MG tablet Take 5 mg by mouth At Bedtime 10/2/2023 at 2030 Yes Reported, Patient     multivitamin, therapeutic (THERA-VIT) TABS tablet Take 1 tablet by mouth daily 10/3/2023 at 0900 Yes Unknown, Entered By History     nystatin (MYCOSTATIN) 228313 UNIT/GM external powder Apply topically 2  times daily Wash and dry area under breasts before applying powder 10/3/2023 at 0900 Yes Unknown, Entered By History     ondansetron (ZOFRAN ODT) 4 MG disintegrating tablet [ONDANSETRON (ZOFRAN ODT) 4 MG DISINTEGRATING TABLET] Take 1 tablet (4 mg total) by mouth every 8 (eight) hours as needed for nausea. Past Week at PRN Yes Roge Ball MD     risperiDONE (RISPERDAL) 0.25 MG tablet Take 0.25 mg by mouth At Bedtime PCP to follow and further GDR. 10/2/2023 at 2100 Yes Kobe Montenegro MD Yes    sertraline (ZOLOFT) 50 MG tablet Take 50 mg by mouth daily 10/3/2023 at 0900 Yes Unknown, Entered By History Yes    traMADol (ULTRAM) 50 MG tablet Take 1 tablet (50 mg) by mouth every 6 hours as needed for moderate pain 9/28/2023 at PRN Yes Gabrielle Arrieta PA-C     trolamine salicylate (ASPERCREME) 10 % external cream Apply topically 2 times daily 10/3/2023 at 0900 Yes Unknown, Entered By History

## 2023-10-03 NOTE — ED NOTES
Pt becomes frustrated and combative with ongoing blood pressure checks and pulse ox. Confirmed with DANETTE Watson that it is okay if we do spot checks instead of continuous monitoring.

## 2023-10-03 NOTE — PLAN OF CARE
Patient seen and evaluated in ED on 10/3/2023.   Worsening leg pain x 2 weeks s/p cephalomedullary nailing on 4/16/2023 with Dr. Arredondo. Imaging demonstrates fractured morena and fractured distal screw.     Plan for hardware removal and left hip hemiarthroplasty on Thursday 10/5/2023 at 12pm with Dr. Arredondo.     Admission for medical optimization and clearance for surgery.    [facility] will follow, please page with questions. Full consult note pending.     Liana Enrique PA-C  10/03/23  5:22 PM

## 2023-10-03 NOTE — CONSULTS
ORTHOPEDIC CONSULTATION    CHIEF COMPLAINT: Left femur and knee pain      HISTORY OF PRESENT ILLNESS:  The patient is seen in orthopedic consultation at the request of Xavier Vides MD.  The patient is a 83 year old female with history of advanced dementia, hypothyroidism, hyperlipidemia, DNR status who c/o left femur and knee pain.  She is accompanied by her daughter.  Shanta is a poor historian and her daughter provides the history for her.  She reports that for the past 2 weeks she has been complaining of left leg pain.  She is approximately 5 months s/p left hip cephalomedullary nailing by Dr. Arredondo of [facility].  They report there is no specific inciting event that caused her pain to begin.  They were seen in clinic last week where she received a cortisone injection in the left knee that did not provide any relief.  She has continued to have pain and difficulty ambulating and transferring. She was brought by EMS to the emergency department due to her change in activity and for further evaluation.       PAST MEDICAL HISTORY:     Past Medical History:   Diagnosis Date     Bilateral lower extremity edema      Dementia (H)      Hyperlipidemia      Hypothyroidism        ALLERGIES:      Allergies   Allergen Reactions     Alendronate GI Disturbance     Gastrointestinal reflux     Calcitonin GI Disturbance     Gastrointestinal reflux        MEDICATIONS ON ADMISSION:  Medications were reviewed.  They do include:   (Not in a hospital admission)      SOCIAL HISTORY:   Social History     Tobacco Use     Smoking status: Never   Substance Use Topics     Alcohol use: Not Currently        FAMILY HISTORY:  Family History   Problem Relation Age of Onset     Heart Failure Mother        REVIEW OF SYSTEMS:   See Admission History and Physical     PHYSICAL EXAMINATION:    Temp:  [97  F (36.1  C)] 97  F (36.1  C)  Pulse:  [76-88] 76  BP: (116-128)/(58-69) 128/69  SpO2:  [96 %-97 %] 96 %    General: On examination,  the patient is alert but confused.  SKIN/HAIR/NAILS: normal   Pulses:  Dorsalis pedis pulses intact  Sensation: intact bilateral lower extremities  Tenderness: To palpation noted through the femur and lateral knee.  At this point further examination was declined by the patient due to pain.  ROM: Plantarflexion dorsiflexion of the left ankle are intact.  Range of motion of the left knee was deferred due to pain on exam.  Crepitus: none  Motor: Decreased due to pain and refusal to perform motor functions.  Contralateral side= Full range of motion, Negative joint instability findings, 5/5 motor groups about the joint, Non-tender.     RADIOGRAPHIC EVALUATION:  EXAM: XR PELVIS 1/2 VIEWS, XR FEMUR LEFT 2 VIEWS, XR KNEE LEFT 1/2 VIEWS  LOCATION: [facility]  DATE: 10/3/2023     INDICATION: Left upper leg pain x2 weeks  COMPARISON: 04/15/2023                                                                      IMPRESSION: Status post ORIF of the proximal left femur fracture. The intramedullary morena is fractured proximally and one of the screws in the distal femoral metaphysis is fractured. The proximal femur fracture is chronic but incompletely healed with   lateral fracture apex angulation and a displaced lesser trochanteric fragment. Normal joint alignment. Degenerative changes throughout the pelvis and lower lumbar spine. Osteopenia.      LABORATORY DATA:     Lab Results   Component Value Date    INR 0.98 04/15/2023       IMPRESSION:  83 year old female 5 months s/p      PLAN:  The patient was discussed with [facility] on-call physician, Dr. Luis Betancourt. The recommendation is for surgical revision of the left femur. The patient will be admitted for revision of the left hip involving hardware removal and left hip hemiarthroplasty on Thursday 10/5/2023 with Dr. Arredondo. She will require Medical clearance prior to surgery. Pain control as indicated.     Thank you for including [facility] in the care of Shanta Tello, it has been a pleasure participating in their care.     Liana Enrique PA-C

## 2023-10-04 NOTE — PROGRESS NOTES
Care Management Follow Up    Length of Stay (days): 1    Expected Discharge Date: 10/08/2023     Concerns to be Addressed: discharge planning, care progression      Patient plan of care discussed at interdisciplinary rounds: No    Anticipated Discharge Disposition:  return to [facility], pending clinical progression post-operatively     Anticipated Discharge Services:  Pending clinical progression  Anticipated Discharge DME:  Per Therapy and Treatment Team    Patient/family educated on Medicare website which has current facility and service quality ratings:    Education Provided on the Discharge Plan:    Patient/Family in Agreement with the Plan:      Referrals Placed by CM/SW:  None at this time  Private pay costs discussed:  not at this time    Additional Information:  Chart reviewed.  [facility].  Plan for surgery on 10/5/2023 for left hip surgical revision.  Anticipate pt will need PT and OT evaluations post-operatively.    Care Management will continue to follow for care progression and discharge planning.    Perla Tom RN

## 2023-10-04 NOTE — PROGRESS NOTES
Orthopedic Progress Note      Assessment:    S/p left hip ORIF with broken distal femur screw, proximal aspect of intramedullary morena needing revision    Plan:   - Hip will require surgical revision which will be done tomorrow with Dr. Arredondo.  - NPO at midnight tonight  - Continue current pain regimen  - Medical clearance per hospitalist  - MELISA SOLORZANO      Subjective:    Patient reports feeling well today. Does not note any pain in hip.  She does seem confused to person and place at this time.  All questions/concerns answered.      Objective:  /79 (BP Location: Right arm)   Pulse 67   Temp 97.9  F (36.6  C) (Oral)   Resp 18   Ht 1.524 m (5')   Wt 72.6 kg (160 lb)   SpO2 97%   BMI 31.25 kg/m      Patient is A&Ox3, sitting up in bed  Calves without tenderness, neg Saul's  Brisk capillary refill in the toes.   Palpable Left dorsalis pedis pulse. Left foot warm & well-perfused.  Sensation is intact to light touch & equal bilaterally in the femoral, DP, SP & tibial nerve distributions.  TTP of lateral hip  DF/PF intact, wiggles toes    Contralateral side= Full range of motion, Negative joint instability findings, 5/5 motor groups about the joint, Non-tender.       Pertinent Labs   Lab Results: personally reviewed.   Lab Results   Component Value Date    INR 0.98 04/15/2023     Lab Results   Component Value Date    WBC 11.3 (H) 10/04/2023    HGB 12.3 10/04/2023    HCT 38.3 10/04/2023     (H) 10/04/2023     10/04/2023     Lab Results   Component Value Date     10/03/2023    CO2 26 10/03/2023         Report completed by:  JOSEPH RANDHAWA PA-C  Date: 10/04/2023  [facility]

## 2023-10-04 NOTE — PROGRESS NOTES
[facility]    Medicine Progress Note - Hospitalist Service    Date of Admission:  10/3/2023    Assessment & Plan      Shanta Tello is a 83 year old female with past medical history of advanced dementia, hypothyroidism, hyperlipidemia, chronic lower extremity edema, left femur fracture s/p cephalomedullary nailing on 4/16/2023 was brought to ED for evaluation of worsening left leg pain for 2 weeks, imaging reported intramedullary morena is fractured proximally and one of the screws in the distal femur metaphysis is fractured.  ED provider discussed with orthopedic team and they recommended admission for surgical revision.    History of left femur # s/p cephalomedullary nailing on 4/16/2023;  Acute LLE pain, x-ray reported intramedullary morena is fractured;  -- Per patient's daughter, patient was recovering well after surgery, walking independently without using a cane until 2 weeks ago when started complaining of left leg pain, no reported trauma/fall  -- In ED, x-ray reported intramedullary morena is fractured proximally and one of the screw in the distal femoral metaphysis is fractured  -- ED provider discussed with orthopedic team and they recommended admission for surgical revision  -- Continue scheduled Tylenol, as needed PTA tramadol, dose decreased from 50 to 25 mg every 6 hours as needed due to concern for sedation    Preop evaluation;  -- Per patient daughter, patient recovering well after surgery in 4/2023, walking independently with no complaint of chest pain, shortness of breath, dyspnea on exertion until 2 weeks ago when she could not walk due to increased left leg pain from fracture.  Per daughter, no history of CAD, heart failure, DM, CKD.  EKG seems sinus rhythm. Patient would be moderate surgical risk.    History of dementia with behavioral issues;  -- Per patient daughter patient does have some sundowning  -- Continue PTA BuSpar, Aricept, Risperdal, Zoloft  -- Delirium protocol.   Per daughter, Seroquel had sedative effect in the past and would like to avoid    History of hypothyroidism;  -- On 8/24, TSH 9.82 (before that around 15), pharmacy reported levothyroxine dose increased.  Continue recently increased dose.  Recheck TSH in couple of weeks    Chronic lower extremity edema;  -- Patient's daughter at bedside, reported a stable to improving.  Also reported no history of heart failure  -- Continue PTA Lasix.    Mild leukocytosis; likely due to stress  -- No reported recent febrile illness, no recent complaint of respiratory/GI/ symptoms per patient's daughter.  However, patient seems more confused which may be due to different surroundings.  We will get a UA/UC  -- WBC trending down       Diet: Combination Diet Low Saturated Fat Na <2400mg Diet    DVT Prophylaxis: Enoxaparin (Lovenox) SQ  Dotson Catheter: Not present  Lines: None     Cardiac Monitoring: None  Code Status: No CPR- Do NOT Intubate      Clinically Significant Risk Factors Present on Admission                       # Obesity: Estimated body mass index is 31.25 kg/m  as calculated from the following:    Height as of this encounter: 1.524 m (5').    Weight as of this encounter: 72.6 kg (160 lb).              Disposition Plan      Expected Discharge Date: 10/08/2023    Discharge Delays: Procedure Pending (enter procedure & time in comments)  PT Disposition recs needed              Viral AMOS MD  Hospitalist Service  [facility]  Securely message with [employer] (more info)  Text page via [employer] Paging/Directory   ______________________________________________________________________    Interval History   Patient seen and examined.  Notes, labs, imaging report personally reviewed.  No acute issues reported overnight.  Patient oriented to self, cannot provide reliable ROS, intermittent restlessness.  Discussed with nursing staff at bedside.    Physical Exam   Vital Signs: Temp: 97.9  F (36.6  C) Temp src: Oral  BP: 131/79 Pulse: 67   Resp: 18 SpO2: 97 % O2 Device: None (Room air)    Weight: 160 lbs 0 oz      General: Not in obvious distress.  HEENT: Normocephalic, supple neck  Chest: Clear to auscultation bilateral anteriorly, no wheezing  Heart: S1S2 normal, regular  Abdomen: Soft.  No grimace on abdominal palpation. Bowel sounds- active.  Extremities: Trace legs swelling  Neuro: Oriented to self, moves all extremities        Medical Decision Making             Data     I have personally reviewed the following data over the past 24 hrs:    11.3 (H)  \   12.3   / 251     139 104 33.3 (H) /  104 (H)   4.9 26 0.94 \     Procal: N/A CRP: 109.90 (H) Lactic Acid: N/A         Imaging results reviewed over the past 24 hrs:   Recent Results (from the past 24 hour(s))   XR Femur Left 2 Views    Narrative    EXAM: XR PELVIS 1/2 VIEWS, XR FEMUR LEFT 2 VIEWS, XR KNEE LEFT 1/2 VIEWS  LOCATION: [facility]  DATE: 10/3/2023    INDICATION: Left upper leg pain x2 weeks  COMPARISON: 04/15/2023      Impression    IMPRESSION: Status post ORIF of the proximal left femur fracture. The intramedullary morena is fractured proximally and one of the screws in the distal femoral metaphysis is fractured. The proximal femur fracture is chronic but incompletely healed with   lateral fracture apex angulation and a displaced lesser trochanteric fragment. Normal joint alignment. Degenerative changes throughout the pelvis and lower lumbar spine. Osteopenia.   XR Pelvis 1/2 Views    Narrative    EXAM: XR PELVIS 1/2 VIEWS, XR FEMUR LEFT 2 VIEWS, XR KNEE LEFT 1/2 VIEWS  LOCATION: [facility]  DATE: 10/3/2023    INDICATION: Left upper leg pain x2 weeks  COMPARISON: 04/15/2023      Impression    IMPRESSION: Status post ORIF of the proximal left femur fracture. The intramedullary morena is fractured proximally and one of the screws in the distal femoral metaphysis is fractured. The proximal femur fracture is chronic but  incompletely healed with   lateral fracture apex angulation and a displaced lesser trochanteric fragment. Normal joint alignment. Degenerative changes throughout the pelvis and lower lumbar spine. Osteopenia.   XR Knee Left 1/2 Views    Narrative    EXAM: XR PELVIS 1/2 VIEWS, XR FEMUR LEFT 2 VIEWS, XR KNEE LEFT 1/2 VIEWS  LOCATION: [facility]  DATE: 10/3/2023    INDICATION: Left upper leg pain x2 weeks  COMPARISON: 04/15/2023      Impression    IMPRESSION: Status post ORIF of the proximal left femur fracture. The intramedullary morena is fractured proximally and one of the screws in the distal femoral metaphysis is fractured. The proximal femur fracture is chronic but incompletely healed with   lateral fracture apex angulation and a displaced lesser trochanteric fragment. Normal joint alignment. Degenerative changes throughout the pelvis and lower lumbar spine. Osteopenia.   US Lower Extremity Venous Duplex Left    Narrative    EXAM: US LOWER EXTREMITY VENOUS DUPLEX LEFT  LOCATION: [facility]  DATE: 10/3/2023    INDICATION: Left upper leg pain x2 weeks. No longer willing to ambulate.  COMPARISON: None.  TECHNIQUE: Venous Duplex ultrasound of the left lower extremity with and without compression, augmentation and duplex. Color flow and spectral Doppler with waveform analysis performed.    FINDINGS: Exam includes the common femoral, femoral, popliteal, and contralateral common femoral veins as well as segmentally visualized deep calf veins and greater saphenous vein.     LEFT LOWER EXTREMITY:  No deep vein thrombosis. No superficial thrombophlebitis. No popliteal cyst.      Impression    IMPRESSION:  1.  No deep venous thrombosis in the visualized left lower extremity.

## 2023-10-04 NOTE — PROGRESS NOTES
Patient alert to self, pain in left hip, especially with movement. No IV access. Daughter at bedside until 10pm, appears asleep.     Redness in creases of thighs, powder applied.     Marichuy Hackett RN

## 2023-10-04 NOTE — PLAN OF CARE
"Goal Outcome Evaluation:      Problem: Plan of Care - These are the overarching goals to be used throughout the patient stay.    Goal: Patient-Specific Goal (Individualized)  Description: You can add care plan individualizations to a care plan. Examples of Individualization might be:  \"Parent requests to be called daily at 9am for status\", \"I have a hard time hearing out of my right ear\", or \"Do not touch me to wake me up as it startles me\".  Outcome: Progressing     Problem: Plan of Care - These are the overarching goals to be used throughout the patient stay.    Goal: Optimal Comfort and Wellbeing  Outcome: Progressing     Problem: Risk for Delirium  Goal: Improved Attention and Thought Clarity  Outcome: Progressing    Pt alert with confusion. Keeps removing covers and reaching out to PIV. Staff keeps redirecting the pt and reorienting. Daughter at bedside at this time. Will continue monitoring.                            "

## 2023-10-04 NOTE — PLAN OF CARE
Problem: Plan of Care - These are the overarching goals to be used throughout the patient stay.    Goal: Absence of Hospital-Acquired Illness or Injury  Intervention: Identify and Manage Fall Risk  Recent Flowsheet Documentation  Taken 10/4/2023 0035 by Teresa Pemberton, RN  Safety Promotion/Fall Prevention:   activity supervised   increase visualization of patient  Intervention: Prevent Skin Injury  Recent Flowsheet Documentation  Taken 10/4/2023 0324 by Teresa Pemberton, RN  Body Position:   turned   left  Taken 10/4/2023 0035 by Teresa Pemberton, RN  Body Position:   tilted   right   heels elevated  Goal: Optimal Comfort and Wellbeing  Intervention: Monitor Pain and Promote Comfort  Recent Flowsheet Documentation  Taken 10/4/2023 0324 by Teresa Pemberton, RN  Pain Management Interventions:   medication (see MAR)   emotional support   repositioned   Goal Outcome Evaluation:             Pt alert / disoriented x4 with minimal verbal. Pt intermittently restless. Tramadol given x 1 for pain. Will monitor.

## 2023-10-04 NOTE — PLAN OF CARE
Problem: Hip Fracture Surgical Repair  Goal: Optimal Pain Control and Function  Intervention: Prevent or Manage Pain  Recent Flowsheet Documentation  Taken 10/4/2023 0945 by Neeta Rodriguez RN  Pain Management Interventions: medication (see MAR)   Goal Outcome Evaluation:       Patient disoriented X4 does indicate pain with cares, by hitting, scratching and kicking. Does also vocalize pain with movement. PRN tramadol administered with some relief.       Problem: Hip Fracture Surgical Repair  Goal: Effective Urinary Elimination  Outcome: Progressing      Patient incontinent of urine, was straight cathed and urine sample collected and sent for analysis.

## 2023-10-05 NOTE — PLAN OF CARE
"  Problem: Plan of Care - These are the overarching goals to be used throughout the patient stay.    Goal: Plan of Care Review  Description: The Plan of Care Review/Shift note should be completed every shift.  The Outcome Evaluation is a brief statement about your assessment that the patient is improving, declining, or no change.  This information will be displayed automatically on your shift note.  Outcome: Progressing  Goal: Patient-Specific Goal (Individualized)  Description: You can add care plan individualizations to a care plan. Examples of Individualization might be:  \"Parent requests to be called daily at 9am for status\", \"I have a hard time hearing out of my right ear\", or \"Do not touch me to wake me up as it startles me\".  Outcome: Progressing  Goal: Absence of Hospital-Acquired Illness or Injury  Outcome: Progressing  Intervention: Identify and Manage Fall Risk  Recent Flowsheet Documentation  Taken 10/5/2023 0020 by Teresa Pemberton RN  Safety Promotion/Fall Prevention: activity supervised  Intervention: Prevent Skin Injury  Recent Flowsheet Documentation  Taken 10/5/2023 0453 by Teresa Pemberton RN  Body Position:   turned   left   heels elevated  Taken 10/5/2023 0020 by Teersa Pemberton, RN  Body Position: supine, legs elevated  Goal: Optimal Comfort and Wellbeing  Outcome: Progressing  Intervention: Monitor Pain and Promote Comfort  Recent Flowsheet Documentation  Taken 10/5/2023 0020 by Teresa Pemberton, RN  Pain Management Interventions:   medication (see MAR)   emotional support  Goal: Readiness for Transition of Care  Outcome: Progressing   Goal Outcome Evaluation:       Pt alert / disoriented x4. Pt resistive to cares. Prn tramadol given x1 for pain. Pt incontinent x2 with PVR of 24. Pt NPO since midnight for surgery today. Will monitor.                  "

## 2023-10-05 NOTE — ANESTHESIA PREPROCEDURE EVALUATION
Anesthesia Pre-Procedure Evaluation    Patient: Shanta Tello   MRN: [id number] : 1940        Procedure : Procedure(s):  REMOVAL, INTRAMEDULLARY HERIBERTO, FEMUR  HEMIARTHROPLASTY, HIP, BIPOLAR          Past Medical History:   Diagnosis Date     Bilateral lower extremity edema      Dementia (H)      Hyperlipidemia      Hypothyroidism       Past Surgical History:   Procedure Laterality Date     Meniscal tear repair       OPEN REDUCTION INTERNAL FIXATION HIP NAILING Left 2023    Procedure: LEFT INTERNAL FIXATION, FRACTURE, TROCHANTERIC, HIP, USING PINS OR RODS;  Surgeon: Brian Arredondo MD;  Location: [facility] Main OR      Allergies   Allergen Reactions     Alendronate GI Disturbance     Gastrointestinal reflux     Calcitonin GI Disturbance     Gastrointestinal reflux      Social History     Tobacco Use     Smoking status: Never     Smokeless tobacco: Not on file   Substance Use Topics     Alcohol use: Not Currently      Wt Readings from Last 1 Encounters:   10/03/23 72.6 kg (160 lb)        Anesthesia Evaluation            ROS/MED HX  ENT/Pulmonary:    (-) tobacco use, asthma and COPD   Neurologic:     (+)   dementia,                             Cardiovascular:    (-) CAD and stent   METS/Exercise Tolerance:     Hematologic:    (-) history of blood clots   Musculoskeletal:       GI/Hepatic:       Renal/Genitourinary:       Endo:     (+)          thyroid problem, hypothyroidism,        (-) Type II DM   Psychiatric/Substance Use:       Infectious Disease:       Malignancy:       Other:            Physical Exam    Airway        Mallampati: II   TM distance: > 3 FB   Neck ROM: full     Respiratory Devices and Support         Dental           Cardiovascular   cardiovascular exam normal          Pulmonary   pulmonary exam normal            Other findings: Alert to self. Unable to name family in room.   OUTSIDE LABS:  CBC:   Lab Results   Component Value Date    WBC 11.3 (H) 10/04/2023    WBC 12.0 (H) 10/03/2023     HGB 12.3 10/04/2023    HGB 12.1 10/03/2023    HCT 38.3 10/04/2023    HCT 37.3 10/03/2023     10/04/2023     10/03/2023     BMP:   Lab Results   Component Value Date     10/03/2023     06/22/2023    POTASSIUM 4.9 10/03/2023    POTASSIUM 4.0 06/22/2023    CHLORIDE 104 10/03/2023    CHLORIDE 107 06/22/2023    CO2 26 10/03/2023    CO2 24 06/22/2023    BUN 33.3 (H) 10/03/2023    BUN 19.9 06/22/2023    CR 0.94 10/03/2023    CR 0.87 06/22/2023     (H) 10/05/2023    GLC 93 10/05/2023     COAGS:   Lab Results   Component Value Date    INR 0.98 04/15/2023     POC: No results found for: BGM, HCG, HCGS  HEPATIC:   Lab Results   Component Value Date    ALBUMIN 3.0 (L) 04/18/2023    PROTTOTAL 5.5 (L) 04/18/2023    ALT 14 04/18/2023    AST 28 04/18/2023    ALKPHOS 58 04/18/2023    BILITOTAL 0.6 04/18/2023     OTHER:   Lab Results   Component Value Date    TIM 9.4 10/03/2023    MAG 1.8 04/18/2023    TSH 9.82 (H) 08/24/2023    T4 1.05 04/06/2022    SED 7 10/03/2023       Anesthesia Plan    ASA Status:  3       Anesthesia Type: General.     - Airway: ETT              Consents            Postoperative Care    Pain management: IV analgesics, Oral pain medications.   PONV prophylaxis: Ondansetron (or other 5HT-3), Dexamethasone or Solumedrol     Comments:              Benjamin Olivarez DO

## 2023-10-05 NOTE — PLAN OF CARE
/79 (BP Location: Right arm)   Pulse 76   Temp 97.4  F (36.3  C) (Oral)   Resp 18   Ht 1.524 m (5')   Wt 72.6 kg (160 lb)   SpO2 93%   BMI 31.25 kg/m      Patient VSS throughout shift. Patient is not oriented to anything. Patient not reporting pain. Incontinent of bowel and bladder. Surgery scheduled for tomorrow at 1200, NPO tonight at 0000.     Patient does not do well with incontinent cares, resists staff and can become very agitated. Takes medications okay crushed with little bites of applesauce. Daughter very involved in care, will be here for surgery tomorrow.

## 2023-10-05 NOTE — PLAN OF CARE
Goal Outcome Evaluation:      Plan of Care Reviewed With: child    Overall Patient Progress: no change    Patient taken to surgery at 1130 this am for left removal intramedullary morena-femur hemiplasty hip bipolar.

## 2023-10-05 NOTE — OP NOTE
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 Item Serial No.  Lot No. LRB No. Used Action   IMP SCR STRK LOCKING T2 F/T 5X47.5MM 1896-5047S - [id number] Metallic Hardware/[employer] [place] IMP SCR STRK LOCKING T2 F/T 5X47.5MM 1896-5047S  [employer] [id number] Left 1 Explanted   IMP SCR STRK LOCK 5.0X42.5MM FT 1896-5042S - [id number] Metallic Hardware/[employer] [place] IMP SCR STRK LOCK 5.0X42.5MM FT 1896-5042S  [employer] [id number] Left 1 Explanted   NAIL GAMMA3 S L [id number] X 130DEG 3530-0360S - [id number] Metallic Hardware/[employer] [place] NAIL GAMMA3 S L [id number] X 130DEG 3530-0360S  [employer] [id number] Left 1 Explanted   IMP SCR BONE STRK G3 LAG 10.5X90MM TI 3060-0090S - [id number] Metallic Hardware/[employer] [place] IMP SCR BONE STRK G3 LAG 10.5X90MM TI 3060-0090S  [employer] [id number] Left 1 Explanted   IMP WIRE GILDA 3.0Z996EX 1210-6450S - [id number] Wire IMP WIRE GILDA 3.2Q299KO 1210-6450S  [employer] [id number] Left 1 Explanted   IMP WIRE GILDA STRK 3.0I816MI 1806-0050S - [id number] Wire IMP WIRE GILDA STRK 3.1C708FV 1806-0050S  [employer] [id number] Left 1 Explanted   BONE CEMENT SIMPLEX FULL DOSE 6191-1-001 - [id number] Cement, Bone BONE CEMENT SIMPLEX FULL DOSE 6191-1-001  [employer] [id number] Left 2 Implanted   IMP STEM FEM STRK OMNIFIT RYAN LONG SZ 7 83B208MC LT - [id number] Total Joint Component/Insert IMP STEM FEM STRK OMNIFIT RYAN LONG SZ 7 [id number] LT   [employer] [place] [id number] Left 1 Implanted   IMP HEAD STRK FEMORAL UHR BIPOLAR 52M12SL UH1-45-28 - [id number] Total Joint Component/Insert IMP HEAD STRK FEMORAL UHR BIPOLAR [id number] UH1-45-28  [employer] [id number] Left 1 Implanted   IMP HEAD STRK FEMORAL C-TAPER COCR LFIT 28MM +5MM  - [id number] Total Joint Component/Insert IMP HEAD STRK FEMORAL C-TAPER COCR LFIT 28MM +5MM   [employer] [id number] Left 1 Implanted     History of present illness:    Shanta is an 83-year-old female who sustained an intertrochanteric femur fracture approximately 6 months prior.  She initially did well but over the last several weeks so she developed increasing pain in her hip and knee.  She was initially seen in clinic with a broken distal screw but no evidence of nonunion or hardware failure proximally.  Ultimately she had an acute increase in her pain and presented to the emergency room where it was found that she had a broken proximal nail and displacement of her previous intertrochanteric femur fracture.  At that time I talked with her and her daughter about fixation options and recommended conversion to a hemiarthroplasty.  We talked about risks and benefits and they gave their written consent to proceed.    Surgical detail:    Patient was seen and evaluated in the preoperative area where her daughter gave written consent to proceed.  Risks benefits and alternatives were discussed and the consent was signed.  Proper site was marked.  She was taken back to the operating suite where a general anesthesia was induced.  She was then placed in the right lateral decubitus position.  All pressure points were padded.  Appropriate timeout was taken to confirm the correct patient procedure and operative site.  The operation was begun initially by marking out her previous incisions and starting distally.  We made a incision over her lateral knee and dissected down to her IT band.  This was split with electrocautery.  We  then identified the 2 screw heads and removed the most distal screw in its entirety followed by the more proximal screw which was broken.  We removed this half and then localized this remainder of the screw on the medial side.  A small incision was made over the medial distal thigh and bluntly dissected through the vastus medialis onto the medial femur.  We placed some retractors and visualized the tip of the screw coming through the medial side of the femur.  I was able to get a rongeur around the screw and advance it and remove it through the femur and out of the bone entirely.  We then irrigated these incisions and the lateral IT band was closed with 0 Vicryl in figure-of-eight fashion the subcutaneous tissues on both sides were closed with buried 2-0 Monocryl followed by staples.  We then turned our attention proximally where the patient's previous incision from her nail was extended proximally and distally over her greater trochanter.  This was taken down to the tensor fascia and gluteal fascia which was split with electrocautery.  We placed our Charnley retractor and then began dissection over the posterior lateral portion of the greater trochanter.  We were able to dissect down to the femoral neck and head and then remove her scarred posterior capsule off of her neck.  We then decided to perform an osteotomy of the greater trochanter nonunited fragment to facilitate removal of her head and neck and proximal femur.  Using osteotomes we made a digastric fragment of bone including her vastus lateralis and gluteus medius attachments.  We  this from the lateral trochanter allowing us to expose the remainder of the proximal portion.  We then freed up soft tissue attachments and removed this broken portion of the nail along with the femoral head neck.  We therefore were exposing the top end of the remainder of the nail which was grasped with a rongeur and removed easily.  Remaining fracture fragments were  removed and debrided from the surgical site.  We then sized our femoral head and checked our bipolar head to a 45 mm head.  We then began preparation of the femoral stem.  We reamed and then broached to a size 7 Omnifit stem.  We left the broach in place and began trialing.  With the high offset and +5 head we were able to restore leg length offset and stability.  We then removed our trial components and placed a cement restrictor distally within the canal.  We irrigated thoroughly and then dried the canal followed by pressurizing our cement distal to proximal.  We then cemented our final stem in place holding it in proper version.  We retrialed until we selected the +5 head again to restore offset leg length and stability.  We cleaned and dried the taper and impacted our bipolar head onto the trunnion.  We then reduced the hip to its final position.  We placed a #5 Ethibond through our digastric trochanter fragment and tied this around the femoral neck to maintain position.  We then performed a dilute Betadine soak followed by thorough irrigation with sterile saline.  We turned attention towards closure then by closing the tensor fascia and gluteal fascia with a running #2 strata fix.  Subcutaneous tissues were closed with buried 2-0 Monocryl skin closure was with staples.  We placed Aquacel dressings on the wounds.  All sponge needle instrument ports were correct x2.  She was transferred the PACU hemodynamically stable.      DRAINS:  none    Weight bearing status:  WBAT  Activity: up as tolerated  Anticoagulation protocol: ASA 81mg BID   x 28  day  Recommended follow up:  10-14  days

## 2023-10-05 NOTE — ANESTHESIA PROCEDURE NOTES
Airway       Patient location during procedure: OR       Procedure Start/Stop Times: 10/5/2023 12:43 PM  Staff -        Anesthesiologist:  Benjamin Olivarez DO       Performed By: SRNA  Consent for Airway        Urgency: elective  Indications and Patient Condition       Indications for airway management: pardeep-procedural       Induction type:intravenous       Mask difficulty assessment: 1 - vent by mask    Final Airway Details       Final airway type: endotracheal airway       Successful airway: ETT - single and Oral  Endotracheal Airway Details        ETT size (mm): 7.0       Cuffed: yes       Successful intubation technique: direct laryngoscopy       DL Blade Type: Holland 2       Grade View of Cords: 1       Adjucts: stylet       Position: Right       Measured from: gums/teeth       Secured at (cm): 21       Bite block used: None    Post intubation assessment        Placement verified by: capnometry, equal breath sounds and chest rise        Number of attempts at approach: 1       Number of other approaches attempted: 0       Secured with: silk tape       Ease of procedure: easy       Dentition: Intact and Unchanged    Medication(s) Administered   Medication Administration Time: 10/5/2023 12:43 PM

## 2023-10-05 NOTE — PROGRESS NOTES
[facility] Progress Note - Hospitalist Service    Date of Admission:  10/3/2023    Assessment & Plan   Shanta Tello is a 83 year old female with past medical history of advanced dementia, hypothyroidism, hyperlipidemia, chronic lower extremity edema, left femur fracture s/p cephalomedullary nailing on 4/16/2023 was brought to ED for evaluation of worsening left leg pain for 2 weeks, imaging reported intramedullary morena is fractured proximally and one of the screws in the distal femur metaphysis is fractured.  ED provider discussed with orthopedic team and they recommended admission for surgical revision.    History of left femur # s/p cephalomedullary nailing on 4/16/2023;  Acute LLE pain, x-ray reported intramedullary morena is fractured;  -- Per patient's daughter, patient was recovering well after surgery, walking independently without using a cane until 2 weeks ago when started complaining of left leg pain, no reported trauma/fall  -- In ED, x-ray reported intramedullary morena is fractured proximally and one of the screw in the distal femoral metaphysis is fractured  -- ED provider discussed with orthopedic team and they recommended admission for surgical revision  -- Continue scheduled Tylenol, as needed PTA tramadol, dose decreased from 50 to 25 mg every 6 hours as needed due to concern for sedation    History of dementia with behavioral issues;  -- Per patient daughter patient does have some sundowning  -- Continue PTA BuSpar, Aricept, Risperdal, Zoloft  -- Delirium protocol.  Per daughter, Seroquel had sedative effect in the past and would like to avoid    History of hypothyroidism;  -- On 8/24, TSH 9.82 (before that around 15), pharmacy reported levothyroxine dose increased.  Continue recently increased dose.  Recheck TSH in couple of weeks    History of chronic lower extremity edema;  -- Patient's daughter reported no history of heart failure.  Did not appreciate much  swelling on lower extremity  -- Continue PTA Lasix.    Mild leukocytosis; likely due to stress  -- No reported recent febrile illness, no recent complaint of respiratory/GI/ symptoms per patient's daughter.  UA unremarkable.  WBC count trending down.       Diet: NPO per Anesthesia Guidelines for Procedure/Surgery Except for: Meds    DVT Prophylaxis: Defer to orthopedic team  Dotson Catheter: Not present  Lines: None     Cardiac Monitoring: None  Code Status: No CPR- Do NOT Intubate      Clinically Significant Risk Factors                         # Obesity: Estimated body mass index is 31.25 kg/m  as calculated from the following:    Height as of this encounter: 1.524 m (5').    Weight as of this encounter: 72.6 kg (160 lb)., PRESENT ON ADMISSION            Disposition Plan      Expected Discharge Date: 10/08/2023    Discharge Delays: Procedure Pending (enter procedure & time in comments)  PT Disposition recs needed    Discharge Comments: Left hip OR 10/5.  Will likely need PT & OT post-op          Viral AMOS MD  Hospitalist Service  [facility]  Securely message with [email or web address] (more info)  Text page via [employer] Paging/Directory   ______________________________________________________________________    Interval History   Patient seen and examined.  Notes, labs, imaging report personally reviewed.  Patient follows simple commands, pleasantly confused, cannot provide detailed reliable ROS due to underlying dementia.  Discussed with nursing staff at bedside.    Physical Exam   Vital Signs: Temp: 98  F (36.7  C) Temp src: Oral BP: 127/72 Pulse: 88   Resp: 18 SpO2: 94 % O2 Device: None (Room air)    Weight: 160 lbs 0 oz      General: Not in obvious distress.  HEENT: Normocephalic, supple neck  Chest: Clear to auscultation bilateral anteriorly, no wheezing  Heart: S1S2 normal, regular  Abdomen: Soft. NT, ND. Bowel sounds- active.  Extremities: No legs swelling  Neuro: alert and awake, pleasantly  confused, follows simple commands intermittently      Medical Decision Making             Data         Imaging results reviewed over the past 24 hrs:   No results found for this or any previous visit (from the past 24 hour(s)).

## 2023-10-05 NOTE — CONSULTS
Care Management Initial Consult    General Information  Assessment completed with: Patient, RN at facility       Primary Care Provider verified and updated as needed:     Readmission within the last 30 days:           Advance Care Planning:            Communication Assessment  Patient's communication style: spoken language (English or Bilingual)    Hearing Difficulty or Deaf: no   Wear Glasses or Blind: no    Cognitive  Cognitive/Neuro/Behavioral: .WDL except  Level of Consciousness: confused     Orientation: disoriented to, disoriented x 4  Mood/Behavior: restless, uncooperative     Speech: clear    Living Environment:   People in home: alone     Current living Arrangements: assisted living  [facility] of Facility: [facility]   Able to return to prior arrangements: yes  Living Arrangement Comments: Assisted living    Family/Social Support:  Care provided by: other (see comments)  Provides care for: no one, unable/limited ability to care for self  Marital Status:              Description of Support System: Supportive    Support Assessment: Adequate family and caregiver support    Current Resources:   Patient receiving home care services: No     Community Resources:    Equipment currently used at home: walker, standard, wheelchair, manual  Supplies currently used at home: None    Employment/Financial:  Employment Status: retired        Financial Concerns:   none          Does the patient's insurance plan have a 3 day qualifying hospital stay waiver?  No    Lifestyle & Psychosocial Needs:  Social Determinants of Health     Food Insecurity: Not on file   Depression: Not on file   Housing Stability: Not on file   Tobacco Use: Unknown (4/26/2023)    Patient History     Smoking Tobacco Use: Never     Smokeless Tobacco Use: Unknown     Passive Exposure: Not on file   Financial Resource Strain: Not on file   Alcohol Use: Not on file   Transportation Needs: Not on file   Physical Activity: Not on file    Interpersonal Safety: Not on file   Stress: Not on file   Social Connections: Not on file       Functional Status:  Prior to admission patient needed assistance:   Dependent ADLs:: Ambulation-walker, Dressing, Transfers, Toileting  Dependent IADLs:: Cleaning, Cooking, Laundry, Shopping, Meal Preparation, Medication Management, Money Management, Transportation, Incontinence       Mental Health Status:  Mental Health Status: No Current Concerns       Chemical Dependency Status:  Chemical Dependency Status: No Current Concerns             Values/Beliefs:  Spiritual, Cultural Beliefs, Yazdanism Practices, Values that affect care:                 Additional Information:  Assessed with patient's daughter Angela and NOEL Hughes at [facility] where patient resides ([phone]).  Patient is assist of 1 with ADLs,  she was walking well without an assistive device prior to increased pain 2 weeks ago.   D[facility]   Care management will follow for post op recommendations and update daughter Angela.  Amanda Escamilla LGSW

## 2023-10-05 NOTE — ANESTHESIA PROCEDURE NOTES
"Fascia iliaca Procedure Note    Pre-Procedure   Staff -        Anesthesiologist:  Benjamin Olivarez DO       Performed By: anesthesiologist       Procedure Start/Stop Times: 10/5/2023 12:10 PM and 10/5/2023 12:15 PM       Pre-Anesthestic Checklist: patient identified, IV checked, site marked, risks and benefits discussed, informed consent, monitors and equipment checked, pre-op evaluation, at physician/surgeon's request and post-op pain management  Timeout:       Correct Patient: Yes        Correct Procedure: Yes        Correct Site: Yes        Correct Position: Yes        Correct Laterality: Yes        Site Marked: Yes  Procedure Documentation  Procedure: Fascia iliaca       Laterality: left       Patient Position: supine       Needle Type: insulated       Needle Gauge: 21.        Needle Length (millimeters): 100        Ultrasound guided       1. Ultrasound was used to identify targeted nerve, plexus, vascular marker, or fascial plane and place a needle adjacent to it in real-time.       2. Ultrasound was used to visualize the spread of anesthetic in close proximity to the above referenced structure.       3. A permanent image is entered into the patient's record.    Assessment/Narrative         The placement was negative for: blood aspirated, painful injection and site bleeding       Paresthesias: No.       Bolus given via needle..        Secured via.        Insertion/Infusion Method: Single Shot       Complications: none    Medication(s) Administered   Bupivacaine 0.5% PF (Infiltration) - Infiltration   15 mL - 10/5/2023 12:10:00 PM  Medication Administration Time: 10/5/2023 12:10 PM      FOR [facility] ([facility]) ONLY:   Pain Team Contact information: please page the Pain Team Via [employer]. Search \"Pain\". During daytime hours, please page the attending first. At night please page the resident first.      "

## 2023-10-05 NOTE — ANESTHESIA CARE TRANSFER NOTE
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    Anesthesia Type:   General     Note:    Oropharynx: oropharynx clear of all foreign objects and spontaneously breathing  Level of Consciousness: drowsy  Oxygen Supplementation: face mask  Level of Supplemental Oxygen (L/min / FiO2): 8  Independent Airway: airway patency satisfactory and stable  Dentition: dentition unchanged  Vital Signs Stable: post-procedure vital signs reviewed and stable  Report to RN Given: handoff report given  Patient transferred to: PACU    Handoff Report: Identifed the Patient, Identified the Reponsible Provider, Reviewed the pertinent medical history, Discussed the surgical course, Reviewed Intra-OP anesthesia mangement and issues during anesthesia, Set expectations for post-procedure period and Allowed opportunity for questions and acknowledgement of understanding      Vitals:  Vitals Value Taken Time   /83 10/05/23 1548   Temp     Pulse 87 10/05/23 1548   Resp 18 10/05/23 1548   SpO2 100 % 10/05/23 1548   Vitals shown include unvalidated device data.    Electronically Signed By: CONRADO Borrego CRNA  October 5, 2023  3:50 PM

## 2023-10-06 NOTE — PROGRESS NOTES
[facility] Progress Note - Hospitalist Service    Date of Admission:  10/3/2023    Assessment & Plan   Shanta Tello is a 83 year old female with past medical history of advanced dementia, hypothyroidism, hyperlipidemia, chronic lower extremity edema, left femur fracture s/p cephalomedullary nailing on 4/16/2023 was brought to ED for evaluation of worsening left leg pain for 2 weeks, imaging reported intramedullary morena is fractured proximally and one of the screws in the distal femur metaphysis is fractured.  ED provider discussed with orthopedic team and they recommended admission for surgical revision.    History of left femur # s/p cephalomedullary nailing on 4/16/2023;  Acute LLE pain, x-ray reported intramedullary morena is fractured;  Acute postoperative pain;  -- Per patient's daughter, patient was recovering well after surgery, walking independently without using a cane until 2 weeks ago when started complaining of left leg pain, no reported trauma/fall  -- In ED, x-ray reported intramedullary morena is fractured proximally and one of the screw in the distal femoral metaphysis is fractured  -- On 10/5, status post removal of intramedullary femur morena and left hip bipolar hemiarthroplasty  ED provider discussed with orthopedic team and they recommended admission for surgical revision  -- Continue scheduled Tylenol 1 g 3 times daily.  Increased as needed tramadol back to 50 mg every 6 hours.    Acute blood loss anemia, postoperative and hemodilution;  -- Operative note reported blood loss 500 mL  -- Hemoglobin 12 range on admission.  Today 9.6.  Hemodynamically stable.  Recheck hemoglobin in a.m. and transfusion per surgery protocol    Acute urinary retention;  -- Currently has a Dotson catheter, plan to remove once she is more mobile    History of dementia with behavioral issues;  -- Per patient daughter patient does have some sundowning  -- Continue PTA BuSpar, Aricept,  Risperdal, Zoloft  --Per daughter, Seroquel had sedative effect in the past and would like to avoid.  -- Delirium protocol, overnight cross cover provider added as needed Haldol, dose decreased significantly    History of hypothyroidism;  -- On 8/24, TSH 9.82 (before that around 15), pharmacy reported levothyroxine dose increased.  Continue recently increased dose.  Recheck TSH in couple of weeks    History of chronic lower extremity edema;  -- Patient's daughter reported no history of heart failure.  Did not appreciate much swelling on lower extremity  -- Continue PTA Lasix.    Mild leukocytosis; likely due to stress  -- No reported recent febrile illness, no recent complaint of respiratory/GI/ symptoms per patient's daughter.  UA unremarkable.  WBC count trending down.    Mild hyperkalemia, creatinine normal, recheck in a.m. low potassium diet       Diet: Combination Diet Regular Diet; 3 gm K Diet (low potassium)  Discharge Instruction - Regular Diet Adult    DVT Prophylaxis: On twice daily aspirin per orthopedic team.  Dotson Catheter: PRESENT, indication: Other (Comment) (Post Op, unable to ambulate)  Lines: None     Cardiac Monitoring: None  Code Status: No CPR- Do NOT Intubate      Clinically Significant Risk Factors                         # Obesity: Estimated body mass index is 31.25 kg/m  as calculated from the following:    Height as of this encounter: 1.524 m (5').    Weight as of this encounter: 72.6 kg (160 lb).  , PRESENT ON ADMISSION            Disposition Plan      Expected Discharge Date: 10/08/2023    Discharge Delays: Procedure Pending (enter procedure & time in comments)  PT Disposition recs needed    Discharge Comments: Left hip OR 10/5.  Will likely need PT & OT post-op          Viral AMOS MD  Hospitalist Service  [facility]  Securely message with [employer] (more info)  Text page via [employer] Paging/Directory  "  ______________________________________________________________________    Interval History   Patient seen and examined.  Notes, labs, imaging report personally reviewed.  Overnight ongoing confusion and intermittent restlessness.  This morning patient sitting in a chair, looks uncomfortable likely due to fracture site pain.  Cannot provide reliable ROS.  Discussed with patient's daughter in detail at bedside.  Discussed with nursing staffs.    Physical Exam   Vital Signs: Temp: 98.4  F (36.9  C) Temp src: Oral BP: 112/55 Pulse: 95   Resp: 18 SpO2: 94 % O2 Device: None (Room air) Oxygen Delivery: 5 LPM  Weight: 160 lbs 0 oz      General: Not in obvious distress.  HEENT: Normocephalic, supple neck  Chest: Clear to auscultation bilateral anteriorly, no wheezing  Heart: S1S2 normal, regular  Abdomen: Soft. NT, ND. Bowel sounds- active.  Extremities: No legs swelling  Neuro: Oriented to self, intermittently follows simple commands, moves all extremities        Medical Decision Making             Data     I have personally reviewed the following data over the past 24 hrs:    N/A  \   9.6 (L)   / N/A     138 106 22.0 /  129 (H)   5.2 26 0.87 \       Imaging results reviewed over the past 24 hrs:   Recent Results (from the past 24 hour(s))   POC US Guidance Needle Placement    Narrative    Ultrasound was performed as guidance to an anesthesia procedure.  Click   \"PACS images\" hyperlink below to view any stored images.  For specific   procedure details, view procedure note authored by anesthesia.   XR Pelvis w Hip Port Left  1 View    Narrative    EXAM: XR PELVIS AND HIP PORTABLE LEFT 1 VIEW  LOCATION: [facility]  DATE: 10/5/2023    INDICATION: Status post hip surgery.    COMPARISON: None.      Impression    IMPRESSION: Postoperative changes of a recent left femoral hemiarthroplasty. No acute fracture. 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 Osteopenia.   "

## 2023-10-06 NOTE — PLAN OF CARE
Problem: Risk for Delirium  Goal: Improved Behavioral Control  Intervention: Minimize Safety Risk  Recent Flowsheet Documentation  Taken 10/6/2023 0848 by Seble Ramirez RN  Enhanced Safety Measures: room near unit station     Problem: Hip Fracture Surgical Repair  Goal: Anesthesia/Sedation Recovery  Intervention: Optimize Anesthesia Recovery  Recent Flowsheet Documentation  Taken 10/6/2023 0848 by Seble Ramirez RN  Safety Promotion/Fall Prevention:   activity supervised   room organization consistent   safety round/check completed   supervised activity   clutter free environment maintained   lighting adjusted   Goal Outcome Evaluation:       Patient alert, confused, perhaps only oriented to self at this time. Patient becomes intermittently restless and audibly moans with any manipulation of LLE. Patient can be slightly combative with nursing cares. Writer redirects patient, which sometimes includes re approaching after a few minutes. no prn's have been needed thus far. Writer administered scheduled and prn pain medication crushed in applesauce, patient tolerating. Transfers with chloe, up in chair for meals. Remains on IV fluid at this time d/t poor oral intake. Patient daughter at bedside supporting patient and is a calming presence.

## 2023-10-06 NOTE — PROGRESS NOTES
10/06/23 0920   Appointment Info   Signing Clinician's Name / Credentials (PT) Nicolette Stokes PT   Living Environment   People in Home facility resident   Current Living Arrangements assisted living;apartment  (Memory Care)   Home Accessibility no concerns   Transportation Anticipated agency   Self-Care   Current Activity Tolerance poor   Equipment Currently Used at Home walker, rolling;wheelchair, manual   Fall history within last six months yes   General Information   Onset of Illness/Injury or Date of Surgery 10/03/23   Referring Physician MARY Arredondo   Patient/Family Therapy Goals Statement (PT) none stated   Pertinent History of Current Problem (include personal factors and/or comorbidities that impact the POC) year old female with past medical history of advanced dementia, hypothyroidism, hyperlipidemia, chronic lower extremity edema, left femur fracture s/p cephalomedullary nailing on 4/16/2023 was brought to ED for evaluation of worsening left leg pain for 2 weeks, imaging reported intramedullary morena is fractured proximally and one of the screws in the distal femur metaphysis is fractured. ED provider discussed with orthopedic team and they recommended admission for surgical revision.   Existing Precautions/Restrictions no hip IR;90 degree hip flexion;fall   Weight-Bearing Status - LLE weight-bearing as tolerated   Cognition   Affect/Mental Status (Cognition) confused;agitated   Follows Commands (Cognition) does not follow one-step commands;physical/tactile prompts required;repetition of directions required   Behavioral Issues combative/physical outbursts  (with movement and increased pain)   Cognitive Status Comments very resistive to movement 2/2 pain, hits and pinches   Pain Assessment   Patient Currently in Pain Yes, see Vital Sign flowsheet   Range of Motion (ROM)   ROM Comment L limited 2/2 surgery/pain, R WFL with mobility   Bed Mobility   Bed Mobility supine-sit   Supine-Sit [place] (Bed  Mobility) maximum assist (25% patient effort);2 person assist   Bed Mobility Limitations cognitive deficits;decreased ability to use legs for bridging/pushing   Impairments Contributing to Impaired Bed Mobility pain;decreased strength   Assistive Device (Bed Mobility) draw sheet   Comment, (Bed Mobility) pt resistive, but once sitting calmed down   Transfers   Transfers sit-stand transfer   Sit-Stand Transfer   Sit-Stand [place] (Transfers) maximum assist (25% patient effort);verbal cues;nonverbal cues (demo/gesture);2 person assist   Assistive Device (Sit-Stand Transfers) other (see comments)  (trialed both with and without FWW)   Comment, (Sit-Stand Transfer) unable to get to a full stand with mult attempts, pt very resistive, hitting and pinching   Clinical Impression   Criteria for Skilled Therapeutic Intervention Yes, treatment indicated   PT Diagnosis (PT) impaired functional mobility   Influenced by the following impairments surgery, pain, cognition   Functional limitations due to impairments bed mobility, transfers, gait   Clinical Presentation (PT Evaluation Complexity) Evolving/Changing   Clinical Presentation Rationale presents as diagnosed   Clinical Decision Making (Complexity) moderate complexity   Planned Therapy Interventions (PT) bed mobility training;transfer training;gait training;strengthening;ROM (range of motion)   Anticipated Equipment Needs at Discharge (PT) walker, rolling;wheelchair   Risk & Benefits of therapy have been explained care plan/treatment goals reviewed;daughter   PT Total Evaluation Time   PT Gary, Moderate Complexity Minutes (46561) 10   Physical Therapy Goals   PT Frequency Daily   PT Predicted Duration/Target Date for Goal Attainment 10/13/23   PT Goals Bed Mobility;Transfers;Gait   PT: Bed Mobility Minimal assist;Rolling;Supine to/from sit   PT: Transfers Minimal assist;Sit to/from stand;Bed to/from chair;Assistive device  (assist of 2)   PT: Gait Moderate  assist;Rolling walker;10 feet  (assist of 2)   Interventions   Interventions Quick Adds Therapeutic Activity   Therapeutic Activity   Therapeutic Activities: dynamic activities to improve functional performance Minutes (58754) 15   Symptoms Noted During/After Treatment Increased pain;Fatigue   Treatment Detail/Skilled Intervention static sitting EOB with CGA.  Additional sit/standsx3 with mod/max assist of 2, pt very resistive so placed chloe sling in sitting and lifted pt to bedside chair.  Attempted LE gentle ROM, pt very resistive and hitting out, will try again tomorros   PT Discharge Planning   PT Plan bed mobility, transfers, gt with FWW as able, LE ex/post op hip surgery L   PT Discharge Recommendation (DC Rec) Transitional Care Facility   PT Rationale for DC Rec currently assist of 2 for all mobility   PT Brief overview of current status bed mob with mod/max asist of 2, attempted standing but pt very resistive, max assist of 2   Total Session Time   Timed Code Treatment Minutes 15   Total Session Time (sum of timed and untimed services) 25

## 2023-10-06 NOTE — ANESTHESIA POSTPROCEDURE EVALUATION
Patient: Shanta Tello    Procedure: Procedure(s):  REMOVAL, INTRAMEDULLARY HERIBERTO, LEFT FEMUR  HEMIARTHROPLASTY, LEFT HIP, BIPOLAR       Anesthesia Type:  General    Note:  Disposition: Admission   Postop Pain Control: Uneventful            Sign Out: Well controlled pain   PONV: No   Neuro/Psych: Uneventful            Sign Out: Acceptable/Baseline neuro status   Airway/Respiratory: Uneventful            Sign Out: Acceptable/Baseline resp. status   CV/Hemodynamics: Uneventful            Sign Out: Acceptable CV status; No obvious hypovolemia; No obvious fluid overload   Other NRE: NONE   DID A NON-ROUTINE EVENT OCCUR? No           Last vitals:  Vitals Value Taken Time   /99 10/05/23 1645   Temp 37  C (98.6  F) 10/05/23 1552   Pulse 99 10/05/23 1651   Resp 42 10/05/23 1651   SpO2 94 % 10/05/23 1702   Vitals shown include unvalidated device data.    Electronically Signed By: Arabella Multani MD  October 5, 2023  9:38 PM

## 2023-10-06 NOTE — PLAN OF CARE
"  Problem: Plan of Care - These are the overarching goals to be used throughout the patient stay.    Goal: Plan of Care Review  Description: The Plan of Care Review/Shift note should be completed every shift.  The Outcome Evaluation is a brief statement about your assessment that the patient is improving, declining, or no change.  This information will be displayed automatically on your shift note.  Outcome: Progressing  Goal: Patient-Specific Goal (Individualized)  Description: You can add care plan individualizations to a care plan. Examples of Individualization might be:  \"Parent requests to be called daily at 9am for status\", \"I have a hard time hearing out of my right ear\", or \"Do not touch me to wake me up as it startles me\".  Outcome: Progressing  Goal: Absence of Hospital-Acquired Illness or Injury  Outcome: Progressing  Intervention: Identify and Manage Fall Risk  Recent Flowsheet Documentation  Taken 10/6/2023 0300 by Peggy Pemberton, RN  Safety Promotion/Fall Prevention: activity supervised  Intervention: Prevent Skin Injury  Recent Flowsheet Documentation  Taken 10/6/2023 0300 by Peggy Pemberton, RN  Body Position:   turned   right  Goal: Optimal Comfort and Wellbeing  Outcome: Progressing  Goal: Readiness for Transition of Care  Outcome: Progressing     Problem: Hip Fracture Surgical Repair  Goal: Optimal Coping with Change in Health Status  Outcome: Progressing  Goal: Absence of Bleeding  Outcome: Progressing  Goal: Effective Bowel Elimination  Outcome: Progressing  Goal: Cognitive Function Maintained  Outcome: Progressing  Goal: Fluid and Electrolyte Balance  Outcome: Progressing  Goal: Absence of Infection Signs and Symptoms  Outcome: Progressing  Goal: Optimal Functional Ability  Outcome: Progressing  Intervention: Promote Optimal Functional Status  Recent Flowsheet Documentation  Taken 10/6/2023 0300 by Peggy Pemberton, RN  Activity Management: bedrest  Taken 10/6/2023 0043 by Nilay" Peggy BLOOM RN  Activity Management: bedrest  Goal: Anesthesia/Sedation Recovery  Outcome: Progressing  Intervention: Optimize Anesthesia Recovery  Recent Flowsheet Documentation  Taken 10/6/2023 0300 by Peggy Pemberton RN  Safety Promotion/Fall Prevention: activity supervised  Goal: Optimal Pain Control and Function  Outcome: Progressing  Goal: Nausea and Vomiting Relief  Outcome: Progressing  Goal: Effective Urinary Elimination  Outcome: Progressing  Goal: Effective Oxygenation and Ventilation  Outcome: Progressing  Intervention: Optimize Oxygenation and Ventilation  Recent Flowsheet Documentation  Taken 10/6/2023 0300 by Peggy Pemberton, RN  Head of Bed (HOB) Positioning: HOB lowered   Goal Outcome Evaluation:    Pt vaguely oriented to self only. Unable to communicate needs. Very restless and grimacing with any movement. Tramadol and melatonin given. Pt agitated and combative with repositioning. Refusing pain meds after that. Reapproached and did take tylenol crushed in pudding. Occasionally follows commands.

## 2023-10-06 NOTE — PROGRESS NOTES
Orthopedic Progress Note      Assessment: 1 Day Post-Op  S/P Procedure(s):  REMOVAL, INTRAMEDULLARY HERIBERTO, LEFT FEMUR  HEMIARTHROPLASTY, LEFT HIP, BIPOLAR     Plan:   - Continue PT/OT  - Weightbearing status: WBAT LLE  - Activity: Up with assist and assistive device until independent.  - Anticoagulation: ASA 81 PO BID in addition to SCDs, lora stockings and early ambulation.  - Pain Management; continue current regimen  - Diet: progress diet as tolerated  - Labs: hgb 10.0, transfuse if <7.0. No indication today  - Dressing: Keep dry and intact  - Elevation: Elevate LLE on pillow to keep above the level of the heart as much as possible  - Follow-up: Outpatient follow up in 2 weeks  - Disposition: Anticipate discharge likely TCU pending therapy recommendations, medical stability      Subjective:  Pain: moderate  Nausea, Vomiting:  No  Lightheadedness, Dizziness:  No  Neuro:  Patient denies new onset numbness or paresthesias  Fever, chills: No  Chest pain: No  SOB: No    Patient reports feeling well today. Patient is confused at today's visit.  Unable to answer questions or follow commands.       Objective:  /63 (BP Location: Right arm)   Pulse 94   Temp 98.4  F (36.9  C) (Oral)   Resp 18   Ht 1.524 m (5')   Wt 72.6 kg (160 lb)   SpO2 94%   BMI 31.25 kg/m      The patient is A&Ox3. Appears comfortable, sitting up in bed  Calves without tenderness, neg Saul's  Brisk capillary refill in the toes.   Palpable Left dorsalis pedis pulse. Left foot warm & well-perfused.  Sensation is intact to light touch & equal bilaterally in the femoral, DP, SP & tibial nerve distributions.  ROM: Appropriately flexes & extends all toes bilaterally.   Motor: +5/5 dorsiflexion, plantar flexion & EHL bilaterally.   Leg lengths equal.  Proximal and distal lateral hip dressings C/D/I.  There is an anterior thigh dressing with minimal dry blood.      No drain     Pertinent Labs   Lab Results: personally reviewed.   Lab Results    Component Value Date    INR 0.98 04/15/2023     Lab Results   Component Value Date    WBC 11.3 (H) 10/04/2023    HGB 9.6 (L) 10/06/2023    HCT 38.3 10/04/2023     (H) 10/04/2023     10/04/2023     Lab Results   Component Value Date     10/06/2023    CO2 26 10/06/2023         Report completed by:  JOSEPH RANDHAWA PA-C  Date: 10/06/2023  [facility]

## 2023-10-06 NOTE — PROGRESS NOTES
Care Management Follow Up    Length of Stay (days): 3    Expected Discharge Date: 10/08/2023     Concerns to be Addressed:     discharge planning  Patient plan of care discussed at interdisciplinary rounds: Yes    Anticipated Discharge Disposition:  TCU     Anticipated Discharge Services:  TCU  Anticipated Discharge DME:  to be determined by treatment team    Patient/family educated on Medicare website which has current facility and service quality ratings:  yes  Education Provided on the Discharge Plan:  yes  Patient/Family in Agreement with the Plan:  yes    Referrals Placed by CM/SW:  [facility] & [facility]  Private pay costs discussed:  will discuss if private pay is a factor    Additional Information:  Previously assessed. Pt was sleeping and no family in room. Called daughter, Angela who reported that they would support TCU placement. Pt had been at [facility] previously and was open to pt returning. [facility] would also be a placement option they would consider.    PC to [facility]= they potentially would have bed availability on Monday. Admissions will call after review.    Patience Chavis, HAROLDO  3:31 PM   10/06/23

## 2023-10-06 NOTE — PLAN OF CARE
Goal Outcome Evaluation:    Problem: Plan of Care - These are the overarching goals to be used throughout the patient stay.    Goal: Optimal Comfort and Wellbeing  Outcome: Progressing     Problem: Risk for Delirium  Goal: Improved Behavioral Control  Outcome: Progressing     Problem: Risk for Delirium  Goal: Improved Sleep  Outcome: Progressing     Problem: Hip Fracture Surgical Repair  Goal: Absence of Bleeding  Outcome: Progressing        Pt alert and confused. Pt wants to pull at lines and pulled Rpiv start of shift, posey sleeve over left wrist iv. Pt sleeping between cares. Dotson kept in place due to difficulty ambulating and straight cathing. Daughter spent most of shift at bedside. Pt denied pain and seemed comfortable during med pass and assessments. Meds crushed in applesauce. LR running 100ml/h. 4 dressings, L thigh and hip, clean dry intact. VSS on RA.

## 2023-10-06 NOTE — PLAN OF CARE
Problem: Risk for Delirium  Goal: Improved Behavioral Control  Intervention: Minimize Safety Risk  Recent Flowsheet Documentation  Taken 10/6/2023 0848 by Seble Ramirez RN  Enhanced Safety Measures: room near unit station     Problem: Hip Fracture Surgical Repair  Goal: Anesthesia/Sedation Recovery  Intervention: Optimize Anesthesia Recovery  Recent Flowsheet Documentation  Taken 10/6/2023 0848 by Seble Ramirez RN  Safety Promotion/Fall Prevention:   activity supervised   room organization consistent   safety round/check completed   supervised activity   clutter free environment maintained   lighting adjusted   Goal Outcome Evaluation:       Patient alert, confused, perhaps only oriented to self at this time. Patient becomes intermittently restless and audibly moans with any manipulation of LLE. Patient can be slightly combative with nursing cares. Writer redirects patient, which sometimes includes re approaching after a few minutes. no prn's have been needed thus far. Writer administered scheduled and prn pain medication crushed in applesauce, patient tolerating. Will monitor alternative indicators of pain using PAINAD scale d/t inability to make needs known. Transfers with chloe, up in chair for meals. Remains on IV fluid at this time d/t poor oral intake. Patient daughter at bedside supporting patient and is a calming presence.

## 2023-10-06 NOTE — PROGRESS NOTES
Occupational Therapy      10/06/23 0915   Appointment Info   Signing Clinician's Name / Credentials (OT) Hillary Liao OTR/L   Living Environment   People in Home alone   Current Living Arrangements assisted living  ([facility]/Memory care)   Home Accessibility no concerns   Living Environment Comments Patient was ambulating independent prior (no device), patient did have hip fracture in April (same hip) and went to TCU for ~1 week and since has been ambulating independently. Patient has assistance with meds/cleaning/cooking/bathing but otherwise independent in her apartment.   Self-Care   Equipment Currently Used at Home walker, standard;wheelchair, manual   Activity/Exercise/Self-Care Comment Per family, patient has not had any falls since April, when she had the hip fracture.   General Information   Onset of Illness/Injury or Date of Surgery 10/03/23   Referring Physician Brian Arredondo MD   Patient/Family Therapy Goal Statement (OT) None stated   Additional Occupational Profile Info/Pertinent History of Current Problem 83 year old female with left femure fracture   Existing Precautions/Restrictions no hip IR;90 degree hip flexion   Left Lower Extremity (Weight-bearing Status) weight-bearing as tolerated (WBAT)   Cognitive Status Examination   Orientation Status not oriented to person, place or time   Follows Commands does not follow one-step commands  (resistive at times)   Safety Deficit impulsivity;insight into deficits/self-awareness   Memory Deficit severe deficit   Cognitive Status Comments Patient has dementia at baseline. Patient was able to rehab successfully after initial fracture in April. Today, patient presents with increased pain causing agitation at times.   Range of Motion Comprehensive   General Range of Motion no range of motion deficits identified   Strength Comprehensive (MMT)   General Manual Muscle Testing (MMT) Assessment no strength deficits identified   Bed Mobility   Bed Mobility  supine-sit   Supine-Sit Labette (Bed Mobility) maximum assist (25% patient effort);2 person assist   Transfers   Transfers sit-stand transfer   Sit-Stand Transfer   Sit-Stand Labette (Transfers) dependent (less than 25% patient effort)   Sit/Stand Transfer Comments Patient will have significant difficultly with all trsfs including toilet, chair and bed   Balance   Balance Comments Unable to get to full stand due to pain and patient resisting   Activities of Daily Living   BADL Assessment/Intervention lower body dressing   Lower Body Dressing Assessment/Training   Labette Level (Lower Body Dressing) dependent (less than 25% patient effort)   Clinical Impression   Criteria for Skilled Therapeutic Interventions Met (OT) Yes, treatment indicated   OT Diagnosis Decreased ADL independence   OT Problem List-Impairments impacting ADL problems related to;activity tolerance impaired;balance;cognition;strength;pain   Assessment of Occupational Performance 5 or more Performance Deficits   Identified Performance Deficits dressing, bed mobility, toileting, trsfs, standing   Planned Therapy Interventions (OT) ADL retraining   Clinical Decision Making Complexity (OT) high complexity   Risk & Benefits of therapy have been explained evaluation/treatment results reviewed;care plan/treatment goals reviewed   OT Total Evaluation Time   OT Eval, High Complexity Minutes (04097) 25   OT Goals   Therapy Frequency (OT) 5 times/wk   OT Predicted Duration/Target Date for Goal Attainment 10/13/23   OT Goals Toilet Transfer/Toileting;Transfers   OT: Transfer Minimal assist   OT: Toilet Transfer/Toileting Minimal assist;toilet transfer;cleaning and garment management   Self-Care/Home Management   Self-Care/Home Mgmt/ADL, Compensatory, Meal Prep Minutes (53904) 10   Treatment Detail/Skilled Intervention Eval completed, treatment initiated. Repeated STS from bed (3 additional times) with increased height of bed. First attempt, used  walker, patient pushing walker, but able to stand with Max A x2 (1 therapist supporting walker). Returned to seated position and stood 2x more times with Max A x2 patient unable to get to full stand. Patient pushing, pinching, increasing getting agitated. Repeated STS while seatd @ EOB and placed sling under patient. Utilized sling to trsf to chair. Patient was left in chair with call light, chair alarm on and daughter in room.   OT Discharge Planning   OT Plan Trsfs, toileting   OT Discharge Recommendation (DC Rec) Transitional Care Facility   OT Rationale for DC Rec Patient currently requiring use of lift for transfers and was previously ambulating without a device.   OT Brief overview of current status Recommend chloe for trsfs. Ax2 for bed mobility, confused, slightly agitated   Total Session Time   Timed Code Treatment Minutes 10   Total Session Time (sum of timed and untimed services) 35

## 2023-10-07 NOTE — PLAN OF CARE
Problem: Hip Fracture Surgical Repair  Goal: Absence of Infection Signs and Symptoms  Outcome: Progressing  Goal: Optimal Functional Ability  Intervention: Promote Optimal Functional Status  Recent Flowsheet Documentation  Taken 10/7/2023 1300 by Nicolette Amanda, RN  Activity Management: up in chair  Goal: Anesthesia/Sedation Recovery  Intervention: Optimize Anesthesia Recovery  Recent Flowsheet Documentation  Taken 10/7/2023 1010 by Nicolette Amanda, RN  Safety Promotion/Fall Prevention:   activity supervised   assistive device/personal items within reach   safety round/check completed  Administration (IS): unable to perform  Goal: Optimal Pain Control and Function  Outcome: Progressing   Goal Outcome Evaluation:         Patient disoriented x4. Daughter at bedside. Pain noted only with movement, comfortable at rest. Up in chair x1 today with therapy. Order to remove soria catheter per Hospitalist. Assist with feeding. Dressings to LLE CDI. CMS intact. VSS.

## 2023-10-07 NOTE — PROGRESS NOTES
Ortho Progress Note    Subjective:  Confused but pleasant this AM. Reports pain is controlled at rest. Denies numbness or tingling, chest pain or shortness of breath.     Objective:  Vital signs in last 24 hours  Temp:  [98.5  F (36.9  C)-99.5  F (37.5  C)] 98.5  F (36.9  C)  Pulse:  [62-95] 91  Resp:  [18] 18  BP: (103-133)/(58-82) 114/59  SpO2:  [94 %] 94 %  General: resting in bed, alert and oriented, NAD  Resp: breathing nonlabored  LLE: Dressing is c/d/I without strikethrough, with the exception of small amount of bloody strikethrough over the distal medial bandage. Fires quad, SILT femoral nerve. 5/5 TA/GSC/EHL. SILT DP/SP/tib. Palpable DP pulse, foot wwp.       Pertinent Labs   Lab Results: personally reviewed.   No results for input(s): INR in the last 168 hours.  Recent Labs   Lab 10/07/23  0648   HGB 8.3*     No results for input(s): CREATININE in the last 168 hours.    Assessment: 2 Days Post-Op   Procedure(s):  REMOVAL, INTRAMEDULLARY HERIBERTO, LEFT FEMUR  HEMIARTHROPLASTY, LEFT HIP, BIPOLAR     Plan:   - Continue PT/OT  - Weightbearing status: WBAT LLE  - Activity: Up with assist and assistive device until independent.  - Anticoagulation: ASA 81 PO BID in addition to SCDs, lora stockings and early ambulation.  - Pain Management; continue current regimen  - Diet: progress diet as tolerated  - Labs: hgb 8.3, transfuse if <7.0. No indication today  - Dressing: Keep dry and intact  - Follow-up: Outpatient follow up in 2 weeks  - Disposition: Anticipate discharge likely TCU pending therapy recommendations, medical stability    Luis Betancourt MD  [facility]

## 2023-10-07 NOTE — PLAN OF CARE
Problem: Plan of Care - These are the overarching goals to be used throughout the patient stay.    Goal: Absence of Hospital-Acquired Illness or Injury  Intervention: Identify and Manage Fall Risk  Recent Flowsheet Documentation  Taken 10/6/2023 1614 by Dottie Oh RN  Safety Promotion/Fall Prevention:   activity supervised   room organization consistent   safety round/check completed   supervised activity   clutter free environment maintained   lighting adjusted  Intervention: Prevent Skin Injury  Recent Flowsheet Documentation  Taken 10/6/2023 1614 by Dottie Oh RN  Body Position:   supine, legs elevated   supine, head elevated   Goal Outcome Evaluation: Patient alert, confused and restless. Patient daughter at bedside. LR infusion discontinued per order. Peripheral IV saline locked. On room air.  1 soft medium bm this shift. Tramadol PRN given 1813. Medication crushed in pudding.  Dotson in place. Surgical wound dressing dry, clean and intact. Maintain safety. Turns and preposition as indicated.

## 2023-10-07 NOTE — PLAN OF CARE
"  Problem: Plan of Care - These are the overarching goals to be used throughout the patient stay.    Goal: Plan of Care Review  Description: The Plan of Care Review/Shift note should be completed every shift.  The Outcome Evaluation is a brief statement about your assessment that the patient is improving, declining, or no change.  This information will be displayed automatically on your shift note.  Outcome: Progressing  Goal: Patient-Specific Goal (Individualized)  Description: You can add care plan individualizations to a care plan. Examples of Individualization might be:  \"Parent requests to be called daily at 9am for status\", \"I have a hard time hearing out of my right ear\", or \"Do not touch me to wake me up as it startles me\".  Outcome: Progressing  Goal: Absence of Hospital-Acquired Illness or Injury  Outcome: Progressing  Intervention: Identify and Manage Fall Risk  Recent Flowsheet Documentation  Taken 10/7/2023 0430 by Peggy Pemberton, RN  Safety Promotion/Fall Prevention: activity supervised  Goal: Optimal Comfort and Wellbeing  Outcome: Progressing  Goal: Readiness for Transition of Care  Outcome: Progressing   Goal Outcome Evaluation:      Pt unable to communicate needs. Oriented to self only. Rarely follows commands. Restless and grimacing. Becomes agitated and combative with repositioning. Tramadol and scheduled tylenol given crushed in pudding.                  "

## 2023-10-07 NOTE — PROGRESS NOTES
[facility]    Medicine Progress Note - Hospitalist Service    Date of Admission:  10/3/2023    Assessment & Plan   Shanta Tello is a 83 year old female with past medical history of advanced dementia, hypothyroidism, hyperlipidemia, chronic lower extremity edema, left femur fracture s/p cephalomedullary nailing on 4/16/2023 was brought to ED for evaluation of worsening left leg pain for 2 weeks, imaging reported intramedullary morena is fractured proximally and one of the screws in the distal femur metaphysis is fractured.  ED provider discussed with orthopedic team and they recommended admission for surgical revision.    History of left femur # s/p cephalomedullary nailing on 4/16/2023;  Acute LLE pain, x-ray reported intramedullary morena is fractured;  Acute postoperative pain;  -- Per patient's daughter, patient was recovering well after surgery, walking independently without using a cane until 2 weeks ago when started complaining of left leg pain, no reported trauma/fall  -- In ED, x-ray reported intramedullary morena is fractured proximally and one of the screw in the distal femoral metaphysis is fractured  -- On 10/5, S/p removal of intramedullary femur morena and left hip bipolar hemiarthroplasty  -- Continue scheduled Tylenol 1 g 3 times daily.  Increased PTA tramadol back to 50 mg every 6 hours as needed.    Acute blood loss anemia, postoperative and hemodilution;  -- Operative note reported blood loss 500 mL  -- Hemoglobin 12 range on admission, hemoglobin trending down, 8.3 today.  Hemodynamically stable.  Recheck hemoglobin in a.m. and transfusion per surgery protocol    Acute urinary retention, postoperative;  -- Currently has a Dotson catheter, plan to remove once she is more mobile, likely in couple of days    History of dementia with behavioral issues;  -- Per patient daughter patient does have some sundowning  -- Continue PTA BuSpar, Aricept, Risperdal, Zoloft  --Per daughter, Seroquel  had sedative effect in the past and would like to avoid.  -- Delirium protocol.    History of hypothyroidism;  -- On 8/24, TSH 9.82 (before that around 15), pharmacy reported levothyroxine dose increased.  Continue recently increased dose.  Recheck TSH in couple of weeks    History of chronic lower extremity edema;  -- Patient's daughter reported no history of heart failure.  Did not appreciate much swelling on lower extremity  -- Continue PTA Lasix.    Mild leukocytosis; likely due to stress  -- No reported recent febrile illness, no recent complaint of respiratory/GI/ symptoms per patient's daughter.  UA unremarkable.  WBC count trending down.    Mild hyperkalemia, creatinine normal, recheck in a.m. low potassium diet       Diet: Combination Diet Regular Diet; 3 gm K Diet (low potassium)  Discharge Instruction - Regular Diet Adult    DVT Prophylaxis: On twice daily aspirin per orthopedic team  Dotson Catheter: PRESENT, indication:  (pt needs to be more mobile before removal)  Lines: None     Cardiac Monitoring: None  Code Status: No CPR- Do NOT Intubate      Clinically Significant Risk Factors                         # Obesity: Estimated body mass index is 31.25 kg/m  as calculated from the following:    Height as of this encounter: 1.524 m (5').    Weight as of this encounter: 72.6 kg (160 lb)., PRESENT ON ADMISSION            Disposition Plan     Expected Discharge Date: 10/08/2023    Discharge Delays: Procedure Pending (enter procedure & time in comments)  PT Disposition recs needed  Destination: nursing home  Discharge Comments: Left hip OR 10/5.  Will likely need PT & OT post-op          Viral AMOS MD  Hospitalist Service  [facility]  Securely message with [employer] (more info)  Text page via [employer] Paging/Directory   ______________________________________________________________________    Interval History   Patient seen and examined.  Notes, labs, imaging report personally reviewed.   Ongoing intermittent restlessness and agitation mostly when in pain.  Patient pleasantly confused and cannot provide reliable ROS.  Discussed with nursing staffs.  Discussed with care manager/  I did not call family member today as no new information to update    Physical Exam   Vital Signs: Temp: 98.5  F (36.9  C) Temp src: Axillary BP: 114/59 Pulse: 91   Resp: 18 SpO2: 94 % O2 Device: None (Room air)    Weight: 160 lbs 0 oz      General: Not in obvious distress.  HEENT: Normocephalic, supple neck  Chest: Clear to auscultation bilateral anteriorly, no wheezing  Heart: S1S2 normal, regular  Abdomen: Soft.  No grimace. Bowel sounds- active.  Extremities: Trace legs swelling  Neuro: Pleasantly confused      Medical Decision Making             Data     I have personally reviewed the following data over the past 24 hrs:    N/A  \   8.3 (L)   / N/A     N/A N/A N/A /  110 (H)   4.8 N/A N/A \       Imaging results reviewed over the past 24 hrs:   No results found for this or any previous visit (from the past 24 hour(s)).

## 2023-10-08 NOTE — PLAN OF CARE
Problem: Plan of Care - These are the overarching goals to be used throughout the patient stay.    Goal: Optimal Comfort and Wellbeing  Outcome: Progressing     Problem: Risk for Delirium  Goal: Improved Behavioral Control  Outcome: Progressing  Intervention: Minimize Safety Risk  Recent Flowsheet Documentation  Taken 10/7/2023 1600 by Diann Pierson RN  Enhanced Safety Measures: room near unit station     Problem: Hip Fracture Surgical Repair  Goal: Optimal Coping with Change in Health Status  Outcome: Progressing     Problem: Hip Fracture Surgical Repair  Goal: Cognitive Function Maintained  Intervention: Maintain Cognitive Function  Recent Flowsheet Documentation  Taken 10/7/2023 1600 by Diann Pierson RN  Reorientation Measures: clock in view   Goal Outcome Evaluation:      Removed pt soria at 1800. Tried purewick on patient and she pulled it out  Pt may incont very small amount of urine. Bladder scan of 89 ml at 2200. Pt very resistant to cares when turning patient or doing incontinence care- Pt will grab or pinch caregiver

## 2023-10-08 NOTE — PLAN OF CARE
Problem: Plan of Care - These are the overarching goals to be used throughout the patient stay.    Goal: Plan of Care Review  Description: The Plan of Care Review/Shift note should be completed every shift.  The Outcome Evaluation is a brief statement about your assessment that the patient is improving, declining, or no change.  This information will be displayed automatically on your shift note.  Outcome: Progressing  Flowsheets (Taken 10/8/2023 0905)  Plan of Care Reviewed With: patient   Goal Outcome Evaluation:patient confused - is a feeder - will be repositioned  Problem: Hip Fracture Surgical Repair  Goal: Optimal Functional Ability  Intervention: Promote Optimal Functional Status  Recent Flowsheet Documentation  Taken 10/8/2023 0810 by Bita Zuniga, RN  Activity Management: bedrest-repositioned every 4 hours      Plan of Care Reviewed With: patient  Plan is to go to transtional care for further therapy

## 2023-10-08 NOTE — PROGRESS NOTES
[facility]    PROGRESS NOTE - Hospitalist Service    Assessment and Plan  Patient is new to me, Shanta Tello is a 83 year old female with past medical history of advanced dementia, hypothyroidism, hyperlipidemia, chronic lower extremity edema, left femur fracture s/p cephalomedullary nailing on 4/16/2023 was brought to ED for evaluation of worsening left leg pain for 2 weeks, imaging reported intramedullary morena is fractured proximally and one of the screws in the distal femur metaphysis is fractured.  ED provider discussed with orthopedic team and they recommended admission for surgical revision.     Acute left femur fracture  - History of left femur # s/p cephalomedullary nailing on 4/16/2023;  - Acute LLE pain, x-ray reported intramedullary morena is fractured;  Acute postoperative pain;  -- Per patient's daughter, patient was recovering well after surgery, walking independently without using a cane until 2 weeks ago when started complaining of left leg pain, no reported trauma/fall  -- In ED, x-ray reported intramedullary morena is fractured proximally and one of the screw in the distal femoral metaphysis is fractured  -- On 10/5, S/p removal of intramedullary femur morena and left hip bipolar hemiarthroplasty  -- Continue scheduled Tylenol 1 g 3 times daily.  Increased PTA tramadol back to 50 mg every 6 hours as needed.     Acute blood loss anemia,   - postoperative and hemodilution;  -- Operative note reported blood loss 500 mL  -- Hemoglobin 12 range on admission, hemoglobin trending down, 8.3 today.  Hemodynamically stable.  Recheck hemoglobin in a.m. and transfusion per surgery protocol     Acute urinary retention,   - postoperative;  -- Currently has a Dotson catheter, plan to remove once she is more mobile, likely in couple of days     History of dementia with behavioral issues;  -- Per patient daughter patient does have some sundowning  -- Continue PTA BuSpar, Aricept, Risperdal,  Zoloft  --Per daughter, Seroquel had sedative effect in the past and would like to avoid.  -- Delirium protocol.     History of hypothyroidism;  -- On 8/24, TSH 9.82 (before that around 15), pharmacy reported levothyroxine dose increased.  Continue recently increased dose.  Recheck TSH in couple of weeks     History of chronic lower extremity edema;  -- Patient's daughter reported no history of heart failure.  Did not appreciate much swelling on lower extremity  -- Continue PTA Lasix.     Mild leukocytosis;   - likely due to stress  -- No reported recent febrile illness, no recent complaint of respiratory/GI/ symptoms per patient's daughter.   -  UA unremarkable.  - WBC count trending down.     Mild hyperkalemia,   - creatinine normal,   -Resolved.       40 MINUTES SPENT BY ME on the date of service doing chart review, history, exam, documentation & further activities per the note    Principal Problem:    Closed fracture of trochanter of left femur, sequela      VTE prophylaxis: Aspirin twice daily as per Ortho  DIET: Orders Placed This Encounter      Combination Diet Regular Diet; 3 gm K Diet (low potassium)      Discharge Instruction - Regular Diet Adult      Disposition/Barriers to discharge: TCU placement  Code Status: No CPR- Do NOT Intubate    Subjective:  Shanta is pleasant confused, no acute significant events overnight.    PHYSICAL EXAM  Vitals:    10/03/23 1102   Weight: 72.6 kg (160 lb)     B/P:126/61 T:98.1 P:83 R:16     Intake/Output Summary (Last 24 hours) at 10/8/2023 1453  Last data filed at 10/8/2023 0945  Gross per 24 hour   Intake 310 ml   Output 750 ml   Net -440 ml      Body mass index is 31.25 kg/m .    Constitutional: awake, alert, cooperative, no apparent distress, and appears stated age  Respiratory: No increased work of breathing, good air exchange, clear to auscultation bilaterally, no crackles or wheezing  Cardiovascular: Normal apical impulse, regular rate and rhythm, normal S1 and S2,  no S3 or S4, and no murmur noted  GI: No scars, normal bowel sounds, soft, non-distended, non-tender, no masses palpated, no hepatosplenomegally  Skin: no bruising or bleeding and normal skin color, texture, turgor  Musculoskeletal: There is no redness, warmth, or swelling of the joints.  Full range of motion noted.  no lower extremity pitting edema present  Neurologic: Awake, alert, oriented to self only.  Neuropsychiatric: Appropriate with examiner      PERTINENT LABS/IMAGING:    I have personally reviewed the following data over the past 24 hrs:    N/A  \   8.0 (L)   / N/A     N/A N/A N/A /  N/A   N/A N/A N/A \       Imaging results reviewed over the past 24 hrs:   No results found for this or any previous visit (from the past 24 hour(s)).    Discussed with patient, family, orthopedics, nursing staff and discharge planner    Rubin Sharp MD  [facility] Medicine Service  [phone]

## 2023-10-08 NOTE — PLAN OF CARE
"  Problem: Plan of Care - These are the overarching goals to be used throughout the patient stay.    Goal: Plan of Care Review  Description: The Plan of Care Review/Shift note should be completed every shift.  The Outcome Evaluation is a brief statement about your assessment that the patient is improving, declining, or no change.  This information will be displayed automatically on your shift note.  Outcome: Progressing  Goal: Patient-Specific Goal (Individualized)  Description: You can add care plan individualizations to a care plan. Examples of Individualization might be:  \"Parent requests to be called daily at 9am for status\", \"I have a hard time hearing out of my right ear\", or \"Do not touch me to wake me up as it startles me\".  Outcome: Progressing  Goal: Absence of Hospital-Acquired Illness or Injury  Outcome: Progressing  Intervention: Identify and Manage Fall Risk  Recent Flowsheet Documentation  Taken 10/8/2023 0530 by Peggy Pemberton RN  Safety Promotion/Fall Prevention: activity supervised  Intervention: Prevent Skin Injury  Recent Flowsheet Documentation  Taken 10/8/2023 0530 by Peggy Pemberton, RN  Body Position:   turned   left  Taken 10/8/2023 0515 by Peggy Pemberton RN  Body Position:   turned   right  Goal: Optimal Comfort and Wellbeing  Outcome: Progressing  Intervention: Monitor Pain and Promote Comfort  Recent Flowsheet Documentation  Taken 10/8/2023 0515 by Peggy Pemberton, RN  Pain Management Interventions: medication (see MAR)  Goal: Readiness for Transition of Care  Outcome: Progressing   Goal Outcome Evaluation:     Pt slept well. Tramadol and tylenol x 1 crushed in pudding. Incontinent x 1. Pvr 111. Pt agitated and resistive with cares.  Refused water this shift.                 " Mohs Histo Method Verbiage: Each section was then chromacoded and processed in the Mohs lab using the Mohs protocol and submitted for frozen section.

## 2023-10-08 NOTE — PROGRESS NOTES
Orthopedic Progress Note    Subjective:  No acute events.     Objective:  /61 (BP Location: Right arm)   Pulse 83   Temp 98.1  F (36.7  C) (Axillary)   Resp 16   Ht 1.524 m (5')   Wt 72.6 kg (160 lb)   SpO2 96%   BMI 31.25 kg/m    Arousable but confused  LLE dressing CDI  Foot WWP  Wiggles toes    Hgb 8.0    Assessment: 3 Days Post-Op  S/P Procedure(s):  REMOVAL, INTRAMEDULLARY HERIBERTO, LEFT FEMUR  HEMIARTHROPLASTY, LEFT HIP, BIPOLAR    Plan:   - Continue PT/OT  - Weightbearing status: WBAT LLE  - Anticoagulation: ASA 81mg BID in addition to SCDs, lora stockings and early ambulation.  - Discharge planning: Likely need TCU, follow up with Dr. Arredondo in 2 weeks    Report completed by:  Sesar Morgan MD  Date: 10/8/2023  Time: 7:38 AM

## 2023-10-08 NOTE — PROGRESS NOTES
Patient figgety busy - pulled bandages off even after tucking blanket in.  Bandages replaced and will continue to keep covered.

## 2023-10-09 NOTE — PROGRESS NOTES
Orthopedic Progress Note    Subjective:  No acute events. Pleasantly confused today but answers questions and responds to commands. No new questions/concerns    Objective:  /60 (BP Location: Right arm, Patient Position: Supine, Cuff Size: Adult Regular)   Pulse 90   Temp 99.8  F (37.7  C) (Axillary)   Resp 18   Ht 1.524 m (5')   Wt 72.6 kg (160 lb)   SpO2 95%   BMI 31.25 kg/m    Arousable but confused  LLE dressing CDI  Foot WWP  Wiggles toes    Hgb 8.0    Assessment: 3 Days Post-Op  S/P Procedure(s):  REMOVAL, INTRAMEDULLARY HERIBERTO, LEFT FEMUR  HEMIARTHROPLASTY, LEFT HIP, BIPOLAR    Plan:   - Continue PT/OT  - Weightbearing status: WBAT LLE  - Anticoagulation: ASA 81mg BID in addition to SCDs, lora stockings and early ambulation.  - Discharge planning: Likely need TCU, follow up with Dr. Arredondo in 2 weeks.  Okay to discharge from an orthopedic standpoint.      JOSEPH RANDHAWA PA-C

## 2023-10-09 NOTE — PLAN OF CARE
"  Problem: Plan of Care - These are the overarching goals to be used throughout the patient stay.    Goal: Plan of Care Review  Description: The Plan of Care Review/Shift note should be completed every shift.  The Outcome Evaluation is a brief statement about your assessment that the patient is improving, declining, or no change.  This information will be displayed automatically on your shift note.  Outcome: Progressing  Goal: Patient-Specific Goal (Individualized)  Description: You can add care plan individualizations to a care plan. Examples of Individualization might be:  \"Parent requests to be called daily at 9am for status\", \"I have a hard time hearing out of my right ear\", or \"Do not touch me to wake me up as it startles me\".  Outcome: Progressing  Goal: Absence of Hospital-Acquired Illness or Injury  Outcome: Progressing  Intervention: Identify and Manage Fall Risk  Recent Flowsheet Documentation  Taken 10/9/2023 0230 by Peggy Pemberton, RN  Safety Promotion/Fall Prevention: activity supervised  Intervention: Prevent Skin Injury  Recent Flowsheet Documentation  Taken 10/9/2023 0230 by Peggy Pemberton, RN  Body Position:   turned   right  Goal: Optimal Comfort and Wellbeing  Outcome: Progressing  Goal: Readiness for Transition of Care  Outcome: Progressing     Problem: Plan of Care - These are the overarching goals to be used throughout the patient stay.    Goal: Plan of Care Review  Description: The Plan of Care Review/Shift note should be completed every shift.  The Outcome Evaluation is a brief statement about your assessment that the patient is improving, declining, or no change.  This information will be displayed automatically on your shift note.  Outcome: Progressing  Goal: Patient-Specific Goal (Individualized)  Description: You can add care plan individualizations to a care plan. Examples of Individualization might be:  \"Parent requests to be called daily at 9am for status\", \"I have a hard time " "hearing out of my right ear\", or \"Do not touch me to wake me up as it startles me\".  Outcome: Progressing  Goal: Absence of Hospital-Acquired Illness or Injury  Outcome: Progressing  Intervention: Identify and Manage Fall Risk  Recent Flowsheet Documentation  Taken 10/9/2023 0230 by Peggy Pemberton RN  Safety Promotion/Fall Prevention: activity supervised  Intervention: Prevent Skin Injury  Recent Flowsheet Documentation  Taken 10/9/2023 0230 by Peggy Pemberton RN  Body Position:   turned   right  Goal: Optimal Comfort and Wellbeing  Outcome: Progressing  Goal: Readiness for Transition of Care  Outcome: Progressing     Problem: Hip Fracture Surgical Repair  Goal: Optimal Coping with Change in Health Status  Outcome: Progressing  Goal: Absence of Bleeding  Outcome: Progressing  Goal: Effective Bowel Elimination  Outcome: Progressing  Goal: Cognitive Function Maintained  Outcome: Progressing  Goal: Fluid and Electrolyte Balance  Outcome: Progressing  Goal: Absence of Infection Signs and Symptoms  Outcome: Progressing  Goal: Optimal Functional Ability  Outcome: Progressing  Intervention: Promote Optimal Functional Status  Recent Flowsheet Documentation  Taken 10/9/2023 0230 by Peggy Pemberton RN  Activity Management: bedrest  Goal: Anesthesia/Sedation Recovery  Outcome: Progressing  Intervention: Optimize Anesthesia Recovery  Recent Flowsheet Documentation  Taken 10/9/2023 0230 by Peggy Pemberton RN  Safety Promotion/Fall Prevention: activity supervised  Goal: Optimal Pain Control and Function  Outcome: Progressing  Goal: Nausea and Vomiting Relief  Outcome: Progressing  Goal: Effective Urinary Elimination  Outcome: Progressing  Goal: Effective Oxygenation and Ventilation  Outcome: Progressing  Intervention: Optimize Oxygenation and Ventilation  Recent Flowsheet Documentation  Taken 10/9/2023 0230 by Peggy Pemberton RN  Head of Bed (HOB) Positioning: HOB at 15 degrees   Goal Outcome Evaluation:     " Pt vaguely oriented to self. Incontinent of urine. Combative with incontinence cares and repositioning. Sleeps between cares.

## 2023-10-09 NOTE — PROGRESS NOTES
Care Management Follow Up    Length of Stay (days): 6    Expected Discharge Date: 10/10/2023     Concerns to be Addressed:    discharge planning   Patient plan of care discussed at interdisciplinary rounds: Yes    Anticipated Discharge Disposition: Transitional Care     Anticipated Discharge Services:  TCU   Anticipated Discharge DME:  n/a    Patient/family educated on Medicare website which has current facility and service quality ratings:  yes  Education Provided on the Discharge Plan:  yes  Patient/Family in Agreement with the Plan: yes    Referrals Placed by CM/SW:  yes  Private pay costs discussed: Not applicable    Additional Information:  Referral resent to [facility] and [facility]; [facility] following for placement.  1:13 PM    Pt accepted to [facility] for tomorrow, 10/10. SW placed call to daughter, Angela, to inform of acceptance and anticipate transfer for tomorrow. Daughter requesting transport due to pt needing more assistance than daughter can offer. [employer] [facility] ride set for 1100 with a  window between [phone] (agreebale to private pay). All parties aware and agreeable to discharge plan.  NEEDS PAS.   2:12 PM    RICHA Fry  10/9/2023

## 2023-10-09 NOTE — PLAN OF CARE
Problem: Plan of Care - These are the overarching goals to be used throughout the patient stay.    Goal: Plan of Care Review  Description: The Plan of Care Review/Shift note should be completed every shift.  The Outcome Evaluation is a brief statement about your assessment that the patient is improving, declining, or no change.  This information will be displayed automatically on your shift note.  Outcome: Progressing     Problem: Plan of Care - These are the overarching goals to be used throughout the patient stay.    Goal: Optimal Comfort and Wellbeing  Outcome: Progressing     Problem: Risk for Delirium  Goal: Improved Behavioral Control  Outcome: Progressing  Intervention: Minimize Safety Risk  Recent Flowsheet Documentation  Taken 10/8/2023 1600 by Diann Pierson RN  Enhanced Safety Measures: room near unit station     Problem: Hip Fracture Surgical Repair  Goal: Absence of Infection Signs and Symptoms  Outcome: Progressing   Goal Outcome Evaluation:  Pt up in chair-transferred back to bed with assist 2 with walker and gait belt. Daughter fed patient-ate much better tonight. Pt gets combative when turning or washing.

## 2023-10-09 NOTE — PLAN OF CARE
Problem: Plan of Care - These are the overarching goals to be used throughout the patient stay.    Goal: Readiness for Transition of Care  Outcome: Progressing   Goal Outcome Evaluation:    Up to chair for breakfast. However loose stools prevented her from staying in the chair. She had two very large loose stools that required full bed and gown changes. Turning in bed is very painful for her. Hip dressing changed twice due to becoming soiled by stool. Plan to discharge tomorrow at 1100.

## 2023-10-09 NOTE — PROGRESS NOTES
[facility]    PROGRESS NOTE - Hospitalist Service    Assessment and Plan  83 year old female with past medical history of advanced dementia, hypothyroidism, hyperlipidemia, chronic lower extremity edema, left femur fracture s/p cephalomedullary nailing on 4/16/2023 was brought to ED for evaluation of worsening left leg pain for 2 weeks, imaging reported intramedullary morena is fractured proximally and one of the screws in the distal femur metaphysis is fractured.  ED provider discussed with orthopedic team and they recommended admission for surgical revision.  Currently stable pending TCU discharge     Acute left femur fracture  - History of left femur # s/p cephalomedullary nailing on 4/16/2023;  - Acute LLE pain, x-ray reported intramedullary morena is fractured;  Acute postoperative pain;  -- Per patient's daughter, patient was recovering well after surgery, walking independently without using a cane until 2 weeks ago when started complaining of left leg pain, no reported trauma/fall  -- In ED, x-ray reported intramedullary morena is fractured proximally and one of the screw in the distal femoral metaphysis is fractured  -- On 10/5, S/p removal of intramedullary femur morena and left hip bipolar hemiarthroplasty  - POD#4  - Continue scheduled Tylenol 1 g 3 times daily.  Increased PTA tramadol back to 50 mg every 6 hours as needed.     Acute blood loss anemia,   - postoperative and hemodilution;  -- Operative note reported blood loss 500 mL  -- Hemoglobin 12 range on admission, hemoglobin trending down, 8.0 today.    - Hemodynamically stable.    - Recheck hemoglobin in a.m. and transfusion per surgery protocol     Acute urinary retention,   - postoperative;  -- Currently has a Doston catheter, plan to remove once she is more mobile, likely in couple of weeks for voiding trial at TCU     History of dementia with behavioral issues;  - Per patient daughter patient does have some sundowning  - Continue  PTA BuSpar, Aricept, Risperdal, Zoloft  - Per daughter, Seroquel had sedative effect in the past and would like to avoid.  - Continue delirium protocol.     History of hypothyroidism;  -- On 8/24, TSH 9.82 (before that around 15), pharmacy reported levothyroxine dose increased. - Continue recently increased dose.    - Recheck TSH in couple of weeks     History of chronic lower extremity edema;  -- Patient's daughter reported no history of heart failure.  Did not appreciate much swelling on lower extremity  -- Continue PTA Lasix.     Mild leukocytosis;   - likely due to stress  -- No reported recent febrile illness, no recent complaint of respiratory/GI/ symptoms per patient's daughter.   -  UA unremarkable.  - WBC count trending down.     Mild hyperkalemia,   - creatinine normal,   - Resolved.            35 MINUTES SPENT BY ME on the date of service doing chart review, history, exam, documentation & further activities per the note    Principal Problem:    Closed fracture of trochanter of left femur, sequela      VTE prophylaxis: Aspirin as per Ortho  DIET: Orders Placed This Encounter      Combination Diet Regular Diet; 3 gm K Diet (low potassium)      Discharge Instruction - Regular Diet Adult      Disposition/Barriers to discharge: Pending TCU placement  Code Status: No CPR- Do NOT Intubate    Subjective:  Shanta is feeling about the same today, denies any chest pain shortness of breath, improving hip pain.    PHYSICAL EXAM  Vitals:    10/03/23 1102   Weight: 72.6 kg (160 lb)     B/P:129/60 T:99.8 P:90 R:18     Intake/Output Summary (Last 24 hours) at 10/9/2023 1543  Last data filed at 10/9/2023 1300  Gross per 24 hour   Intake 520 ml   Output --   Net 520 ml      Body mass index is 31.25 kg/m .    Constitutional: awake, alert, cooperative, no apparent distress, and appears stated age  Respiratory: No increased work of breathing, good air exchange, clear to auscultation bilaterally, no crackles or  wheezing  Cardiovascular: Normal apical impulse, regular rate and rhythm, normal S1 and S2, no S3 or S4, and no murmur noted  GI: No scars, normal bowel sounds, soft, non-distended, non-tender, no masses palpated, no hepatosplenomegally  Skin: no bruising or bleeding and normal skin color, texture, turgor  Musculoskeletal: There is no redness, warmth, or swelling of the joints.  Full range of motion noted.  no lower extremity pitting edema present  Neurologic: Awake, alert, oriented to name, place and time.  Cranial nerves II-XII are grossly intact.  Motor is 5 out of 5 bilaterally.   Sensory is intact.    Neuropsychiatric: Appropriate with examiner      PERTINENT LABS/IMAGING:        Imaging results reviewed over the past 24 hrs:   No results found for this or any previous visit (from the past 24 hour(s)).    Discussed with patient, family, Ortho, nursing staff and discharge planner    Rubin Sharp MD  [facility] Medicine Service  [phone]

## 2023-10-09 NOTE — PLAN OF CARE
Problem: Plan of Care - These are the overarching goals to be used throughout the patient stay.    Goal: Absence of Hospital-Acquired Illness or Injury  Intervention: Identify and Manage Fall Risk  Recent Flowsheet Documentation  Taken 10/9/2023 1611 by Neeta Barron RN  Safety Promotion/Fall Prevention: activity supervised  Intervention: Prevent and Manage VTE (Venous Thromboembolism) Risk  Recent Flowsheet Documentation  Taken 10/9/2023 1611 by Neeta Barron RN  VTE Prevention/Management: SCDs (sequential compression devices) on  Goal: Optimal Comfort and Wellbeing  Intervention: Monitor Pain and Promote Comfort  Recent Flowsheet Documentation  Taken 10/9/2023 1611 by Neeta Barron RN  Pain Management Interventions: distraction     Problem: Risk for Delirium  Goal: Improved Behavioral Control  Intervention: Prevent and Manage Agitation  Recent Flowsheet Documentation  Taken 10/9/2023 1611 by Neeta Barron RN  Environment Familiarity/Consistency: daily routine followed  Intervention: Minimize Safety Risk  Recent Flowsheet Documentation  Taken 10/9/2023 1611 by Neeta Barron RN  Enhanced Safety Measures: room near unit station  Goal: Improved Attention and Thought Clarity  Intervention: Maximize Cognitive Function  Recent Flowsheet Documentation  Taken 10/9/2023 1611 by Neeta Barron RN  Sensory Stimulation Regulation: television on  Reorientation Measures: clock in view     Problem: Hip Fracture Surgical Repair  Goal: Cognitive Function Maintained  Intervention: Maintain Cognitive Function  Recent Flowsheet Documentation  Taken 10/9/2023 1611 by Neeta Barron RN  Reorientation Measures: clock in view  Environment Familiarity/Consistency: daily routine followed  Goal: Anesthesia/Sedation Recovery  Intervention: Optimize Anesthesia Recovery  Recent Flowsheet Documentation  Taken 10/9/2023 1611 by Neeta Barron RN  Safety Promotion/Fall Prevention: activity supervised  Reorientation Measures: clock in view  Goal:  Optimal Pain Control and Function  Intervention: Prevent or Manage Pain  Recent Flowsheet Documentation  Taken 10/9/2023 1611 by Neeta Barron, RN  Pain Management Interventions: distraction   Goal Outcome Evaluation:  Pt has ongoing confusion related to dementia so not able to follow directives to cooperate with cares.  Meds accepted crushed in grape jelly.  Dressings intact over left hip/trochanter.  Daughter requested no use of opiates tonight as they can affect her demeanor.  She has been mostly sleeping between cares.  Fed for 75% of meal.  No further loose stools since imodium administration.

## 2023-10-10 NOTE — DISCHARGE SUMMARY
[facility]  Hospitalist Discharge Summary      Date of Admission:  10/3/2023  Date of Discharge:  10/10/2023  Discharging Provider: Adriana Guaman MD  Discharge Service: Hospitalist Service    Discharge Diagnoses   Left femur fracture    Clinically Significant Risk Factors     # Obesity: Estimated body mass index is 31.25 kg/m  as calculated from the following:    Height as of this encounter: 1.524 m (5').    Weight as of this encounter: 72.6 kg (160 lb).       Follow-ups Needed After Discharge   Follow-up Appointments     Follow Up Care      Please follow-up with Dr. Arredondo's team in 2 weeks at [facility]. Call our scheduling line at [phone] to make an   appointment, if you do not already have one scheduled.        Follow Up and recommended labs and tests      Follow up with retirement physician.  The following labs/tests are   recommended: CBC in 3 days. Follow with primary care provider (PCP) in 1-2   weeks. Repeat Thyroid function test in about a month is recommended-   deferred to PCP. Follow with Orthopedics clinic (Dr. Arredondo) in 2   weeks.              Unresulted Labs Ordered in the Past 30 Days of this Admission       No orders found from 9/3/2023 to 10/4/2023.            Discharge Disposition   Discharged to short-term care facility  Condition at discharge: Stable    Hospital Course    83 year old female with past medical history of advanced dementia, hypothyroidism, hyperlipidemia, chronic lower extremity edema, left femur fracture s/p cephalomedullary nailing on 4/16/2023 was brought to ED for evaluation of worsening left leg pain for 2 weeks, imaging reported intramedullary morena is fractured proximally and one of the screws in the distal femur metaphysis is fractured.  ED provider discussed with orthopedic team and they recommended admission for surgical revision.  Acute left femur fracture  - History of left femur # s/p cephalomedullary nailing on  4/16/2023;  - Acute LLE pain, x-ray reported intramedullary morena is fractured;  Acute postoperative pain;  -- Per patient's daughter, patient was recovering well after surgery, walking independently without using a cane until 2 weeks ago when started complaining of left leg pain, no reported trauma/fall  -- In ED, x-ray reported intramedullary morena is fractured proximally and one of the screw in the distal femoral metaphysis is fractured  -- On 10/5, S/p removal of intramedullary femur morena and left hip bipolar hemiarthroplasty  Acute blood loss anemia,   - postoperative and hemodilution;  -- Operative note reported blood loss 500 mL  -- Hemoglobin 12 range on admission, hemoglobin trending down, 8.0 today.    - Hemodynamically stable.    - Recheck hemoglobin in a.m. and transfusion per surgery protocol  Acute urinary retention- resolved  - postoperative  History of dementia with behavioral issues;  - Per patient daughter patient does have some sundowning  - Continue PTA BuSpar, Aricept, Risperdal, Zoloft  - Per daughter, Seroquel had sedative effect in the past and would like to avoid.  - Continue delirium protocol.  History of hypothyroidism;  -- On 8/24, TSH 9.82 (before that around 15), pharmacy reported levothyroxine dose increased. - Continue recently increased dose.    - Recheck TSH in couple of weeks   History of chronic lower extremity edema;  -- Patient's daughter reported no history of heart failure.  Did not appreciate much swelling on lower extremity  -- Continue PTA Lasix.  Mild leukocytosis;   - likely due to stress  -- No reported recent febrile illness, no recent complaint of respiratory/GI/ symptoms per patient's daughter.   -  UA unremarkable.  - WBC count trending down.  Mild hyperkalemia,   - creatinine normal,   - Resolved.  Patient is clinically and hemodynamically stable for discharge to TCU. Medication reconciliation was done. Follow up appointments and recommendations as shown below.  "Daughter verbalized understanding and agreed to plan of care.     Consultations This Hospital Stay   ORTHOPEDIC SURGERY IP CONSULT  PHARMACY IP CONSULT  CARE MANAGEMENT / SOCIAL WORK IP CONSULT  HOSPITALIST IP CONSULT  CARE MANAGEMENT / SOCIAL WORK IP CONSULT  PHYSICAL THERAPY ADULT IP CONSULT  OCCUPATIONAL THERAPY ADULT IP CONSULT  PHYSICAL THERAPY ADULT IP CONSULT  OCCUPATIONAL THERAPY ADULT IP CONSULT    Code Status   No CPR- Do NOT Intubate    Time Spent on this Encounter   I, Adriana Guaman MD, personally saw the patient today and spent greater than 30 minutes discharging this patient.       Adriana Guaman MD  [place] [id number]  Phone: [phone]  Fax: [phone]  ______________________________________________________________________    Physical Exam   Vital Signs:                    Weight: 160 lbs 0 oz  GEN: Alert  Not in acute distress.  Extremities: No pedal edema.  CNS: No involuntary movements.  Skin: no cyanosis or clubbing.          Primary Care Physician   Yamel Manzanares    Discharge Orders      Reason for your hospital stay    S/p L distal femur fracture ORIF     When to call - Contact Surgeon Team    You may experience symptoms that require follow-up before your scheduled appointment. Refer to the \"Stoplight Tool\" for instructions on when to contact your Surgeon Team if you are concerned about pain control, blood clots, constipation, or if you are unable to urinate.     When to call - Reach out to Urgent Care    If you are not able to reach your Surgeon Team and you need immediate care, go to the Orthopedic Walk-in Clinic or Urgent Care at your Surgeon's office.  Do NOT go to the Emergency Room unless you have shortness of breath, chest pain, or other signs of a medical emergency.     When to call - Reasons to Call 911    Call 911 immediately if you experience sudden-onset chest pain, arm weakness/numbness, slurred " speech, or shortness of breath     Discharge Instruction - Breathing exercises    Perform breathing exercises using your Incentive Spirometer 10 times per hour while awake for 2 weeks.     Symptoms - Fever Management    A low grade fever can be expected after surgery.  Use acetaminophen (TYLENOL) as needed for fever management.  Contact your Surgeon Team if you have a fever greater than 101.5 F, chills, and/or night sweats.     Symptoms - Constipation management    Constipation (hard, dry bowel movements) is expected after surgery due to the combination of being less active, the anesthetic, and the opioid pain medication.  You can do the following to help reduce constipation:  ~  FLUIDS:  Drink clear liquids (water or Gatorade), or juice (apple/prune).  ~  DIET:  Eat a fiber rich diet.    ~  ACTIVITY:  Get up and move around several times a day.  Increase your activity as you are able.  MEDICATIONS:  Reduce the risk of constipation by starting medications before you are constipated.  You can take Miralax   (1 packet as directed) and/or a stool softener (Senokot 1-2 tablets 1-2 times a day).  If you already have constipation and these medications are not working, you can get magnesium citrate and use as directed.  If you continue to have constipation you can try an over the counter suppository or enema.  Call your Surgeon Team if it has been greater than 3 days since your last bowel movement.     Symptoms - Reduced Urine Output    Changes in the amount of fluids you drank before and after surgery may result in problems urinating.  It is important to stay well-hydrated after surgery and drink plenty of water. If it has been greater than 8 hours since you have urinated despite drinking plenty of water, call your Surgeon Team.     Activity - Exercises to prevent blood clots    Unless otherwise directed by your Surgeon team, perform the following exercises at least three times per day for the first four weeks after  surgery to prevent blood clots in your legs: 1) Point and flex your feet (Ankle Pumps), 2) Move your ankle around in big circles, 3) Wiggle your toes, 4) Walk, even for short distances, several times a day, will help decrease the risk of blood clots.     Comfort and Pain Management - Pain after Surgery    Pain after surgery is normal and expected.  You will have some amount of pain for several weeks after surgery.  Your pain will improve with time.  There are several things you can do to help reduce your pain including: rest, ice, elevation, and using pain medications as needed. Contact your Surgeon Team if you have pain that persists or worsens after surgery despite rest, ice, elevation, and taking your medication(s) as prescribed. Contact your Surgeon Team if you have new numbness, tingling, or weakness in your operative extremity.     Comfort and Pain Management - Swelling after Surgery    Swelling and/or bruising of the surgical extremity is common and may persist for several months after surgery. In addition to frequent icing and elevation, gentle compressive support with an ACE wrap or tubigrip may help with swelling. Apply compression regularly, removing at least twice daily to perform skin checks. Contact your Surgeon Team if your swelling increases and is NOT associated with an increase in your activity level, or if your swelling increases and is associated with redness and pain.     Comfort and Pain Management - Cold therapy    Ice can be used to control swelling and discomfort after surgery. Place a thin towel over your operative site and apply the ice pack overtop. Leave ice pack in place for 20 minutes, then remove for 20 minutes. Repeat this 20 minutes on/20 minutes off routine as often as tolerated.     Medication Instructions - Acetaminophen (TYLENOL) Instructions    You were discharged with acetaminophen (TYLENOL) for pain management after surgery. Acetaminophen most effectively manages pain  "symptoms when it is taken on a schedule without missing doses (every four, six, or eight hours). Your Provider will prescribe a safe daily dose between 3000 - 4000 mg.  Do NOT exceed this daily dose. Most patients use acetaminophen for pain control for the first four weeks after surgery.  You can wean from this medication as your pain decreases.     Medication Instructions - Opioids - Tapering Instructions    In the first three days following surgery, your symptoms may warrant use of the narcotic pain medication every four to six hours as prescribed. This is normal. As your pain symptoms improve, focus your efforts on decreasing (tapering) use of narcotic medications. The most successful tapering strategy is to first, decrease the number of tablets you take every 4-6 hours to the minimum prescribed. Then, increase the amount of time between doses.  For example:  First, taper to   or 1 tablet every 4-6 hours.  Then, taper to   or 1 tablet every 6-8 hours.  Then, taper to   or 1 tablet every 8-10 hours.  Then, taper to   or 1 tablet every 10-12 hours.  Then, taper to   or 1 tablet at bedtime.  The bedtime dose can help with comfort during sleep and is typically the last dose to be discontinued after surgery.     Follow Up Care    Please follow-up with Dr. Arredondo's team in 2 weeks at [facility]. Call our scheduling line at [phone] to make an appointment, if you do not already have one scheduled.     Medication instructions -  Anticoagulation - aspirin    Take the aspirin as prescribed for a total of four weeks after surgery.  This is given to help minimize your risk of blood clot.     Comfort and Pain Management - LOWER Extremity Elevation    Swelling is expected for several months after surgery. This type of swelling is usually associated with gravity and activity, and can be improved with elevation.   The best way to do this is to get your \"toes above your nose\" by laying down and placing several " pillows lengthwise under your calf and heel. When elevating your leg keep your knee completely straight. Perform this elevation as often as possible especially for the first two weeks after surgery.     Medication Instructions - Opioid Instructions (1 - 2 tablets Q 4-6 hours, MAX 6 tablets)    You were discharged with an opioid medication (hydromorphone, oxycodone, hydrocodone, or tramadol). This medication should only be taken for breakthrough pain that is not controlled with acetaminophen (TYLENOL). If you rate your pain less than 3 you do not need this medication.  Pain rating 0-3:  You do not need this medication.  Pain rating 4-6:  Take 1 tablet every 4-6 hours as needed  Pain rating 7-10:  Take 2 tablets every 4-6 hours as needed.  Do not exceed 6 tablets per day     Activity    Activity as tolerated     Return to Driving    Return to driving - Driving is NOT permitted until directed by your provider. Under no circumstance are you permitted to drive while using narcotic pain medications.     NO Precautions    No precautions directed by your Provider.  You may perform range of motion activities as tolerated.     Weight bearing as tolerated    Weight bearing as tolerated on your operative extremity.     Dressing / Wound Care - Wound    You have a clean dressing on your surgical wound. Dressing change instructions as follows: dressing will be removed at your follow-up appointment. Contact your Surgeon Team if you have increased redness, warmth around the surgical wound, and/or drainage from the surgical wound.     Dressing / Wound care - Shower with wound/dressing covered    You must COVER your dressing or incision with saran wrap (or any other non-permeable covering) to allow the incision to remain dry while showering.  You may shower 2 days after surgery as long as the surgical wound stays dry. Continue to cover your dressing or incision for showering until your first office visit.  You are strictly prohibited  from soaking or submerging the surgical wound underwater.     Dressing / Wound Care - NO Tub Bathing    Tub bathing, swimming, or any other activities that will cause your incision to be submerged in water should be avoided until allowed by your Surgeon.     General info for SNF    Length of Stay Estimate: Short Term Care: Estimated # of Days <30  Condition at Discharge: Stable  Level of care:skilled   Rehabilitation Potential: Good  Admission H&P remains valid and up-to-date: Yes  Recent Chemotherapy: N/A  Use Nursing Home Standing Orders: Yes     Mantoux instructions    Give two-step Mantoux (PPD) Per Facility Policy Yes     Reason for your hospital stay    Left femur fracture     Discharge Instructions     Follow Up and recommended labs and tests    Follow up with jail physician.  The following labs/tests are recommended: CBC in 3 days. Follow with primary care provider (PCP) in 1-2 weeks. Repeat Thyroid function test in about a month is recommended- deferred to PCP. Follow with Orthopedics clinic (Dr. Arredondo) in 2 weeks.       Physical Therapy Adult Consult    Evaluate and treat as clinically indicated.    Reason:  Impaired functional mobility     Occupational Therapy Adult Consult    Evaluate and treat as clinically indicated.    Reason:  Inability to complete ADLs/IADLs     Fall precautions     Crutches DME    DME Documentation: Describe the reason for need to support medical necessity: Impaired gait status post hip surgery. I, the undersigned, certify that the above prescribed supplies are medically necessary for this patient and is both reasonable and necessary in reference to accepted standards of medical practice in the treatment of this patient's condition and is not prescribed as a convenience.     Cane DME    DME Documentation: Describe the reason for need to support medical necessity: Impaired gait status post hip surgery. I, the undersigned, certify that the above prescribed supplies are  medically necessary for this patient and is both reasonable and necessary in reference to accepted standards of medical practice in the treatment of this patient's condition and is not prescribed as a convenience.     Walker DME    : DME Documentation: Describe the reason for need to support medical necessity: Impaired gait status post hip surgery. I, the undersigned, certify that the above prescribed supplies are medically necessary for this patient and is both reasonable and necessary in reference to accepted standards of medical practice in the treatment of this patient's condition and is not prescribed as a convenience.     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, subsequent encounter      senna-docusate (SENOKOT-S/PERICOLACE) 8.6-50 MG tablet Take 2 tablets by mouth 2 times daily  Qty: 30 tablet, Refills: 0    Associated Diagnoses: 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, subsequent encounter           CONTINUE these medications which have CHANGED    Details   acetaminophen (TYLENOL) 325 MG tablet Take 3 tablets (975 mg) by mouth every 8 hours  Qty: 60 tablet, Refills: 0    Associated Diagnoses: 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 donepezil (ARICEPT) 10 MG ODT Take 10 mg by mouth At Bedtime      !! donepezil (ARICEPT) 5 MG ODT Take 2.5 mg by mouth every morning      furosemide (LASIX) 40 MG tablet Take 40 mg by mouth daily      levothyroxine (SYNTHROID/LEVOTHROID) 75 MCG tablet Take 75 mcg by mouth daily      melatonin 5 MG tablet Take 5 mg by mouth At Bedtime      multivitamin, therapeutic (THERA-VIT) TABS tablet Take 1 tablet by mouth daily      nystatin (MYCOSTATIN) 029851 UNIT/GM external powder Apply topically 2 times daily Wash and dry area under breasts before applying powder      ondansetron (ZOFRAN ODT) 4 MG disintegrating tablet [ONDANSETRON (ZOFRAN ODT) 4 MG DISINTEGRATING TABLET] Take 1 tablet (4 mg total) by mouth every 8 (eight) hours as needed for nausea.  Qty: 30 tablet, Refills: 0      risperiDONE (RISPERDAL) 0.25 MG tablet Take 0.25 mg by mouth At Bedtime PCP to follow and further GDR.      sertraline (ZOLOFT) 50 MG tablet Take 50 mg by mouth daily      trolamine salicylate (ASPERCREME) 10 % external cream Apply topically 2 times daily       !! - Potential duplicate medications found. Please discuss with provider.        Allergies   Allergies   Allergen Reactions    Alendronate GI Disturbance     Gastrointestinal reflux    Calcitonin GI Disturbance     Gastrointestinal reflux

## 2023-10-10 NOTE — PROGRESS NOTES
Care Management Discharge Note    Discharge Date: 10/10/2023       Discharge Disposition: Transitional Care  Discharge Services:  TCU   Discharge DME:  n/a    Discharge Transportation: agency    Private pay costs discussed: transportation costs    Does the patient's insurance plan have a 3 day qualifying hospital stay waiver?  no    Will the waiver be used for post-acute placement? No    PAS Confirmation Code: [id number]  Patient/family educated on Medicare website which has current facility and service quality ratings:  yes    Education Provided on the Discharge Plan:  yes  Persons Notified of Discharge Plans: yes  Patient/Family in Agreement with the Plan: yes    Handoff Referral Completed: Yes    Additional Information:  Pt dishcarging to [facility] today via [facility] at 1100 (agreeable to private pay). [employer] ride set for 1100 with a  window between [phone]. All parties aware and agreeable to discharge plan. PAS done.  9:55 AM    RICHA Fry  10/10/2023

## 2023-10-10 NOTE — PLAN OF CARE
Occupational Therapy Discharge Summary    Reason for therapy discharge:    Discharged to transitional care facility.    Progress towards therapy goal(s). See goals on Care Plan in [facility] electronic health record for goal details.       Therapy recommendation(s):    Recommend continued OT at TCU

## 2023-10-10 NOTE — PLAN OF CARE
Problem: Plan of Care - These are the overarching goals to be used throughout the patient stay.    Goal: Plan of Care Review  Description: The Plan of Care Review/Shift note should be completed every shift.  The Outcome Evaluation is a brief statement about your assessment that the patient is improving, declining, or no change.  This information will be displayed automatically on your shift note.  Outcome: Progressing     Problem: Plan of Care - These are the overarching goals to be used throughout the patient stay.    Goal: Absence of Hospital-Acquired Illness or Injury  Intervention: Identify and Manage Fall Risk  Recent Flowsheet Documentation  Taken 10/10/2023 0109 by Radha Acosta RN  Safety Promotion/Fall Prevention:   lighting adjusted   room door open  Intervention: Prevent Skin Injury  Recent Flowsheet Documentation  Taken 10/10/2023 0109 by Radha Acosta RN  Body Position:   supine, legs elevated   supine, head elevated  Intervention: Prevent and Manage VTE (Venous Thromboembolism) Risk  Recent Flowsheet Documentation  Taken 10/10/2023 0109 by Radha Acosta RN  VTE Prevention/Management: SCDs (sequential compression devices) off     Problem: Plan of Care - These are the overarching goals to be used throughout the patient stay.    Goal: Optimal Comfort and Wellbeing  Outcome: Progressing  Intervention: Monitor Pain and Promote Comfort  Recent Flowsheet Documentation  Taken 10/10/2023 0109 by Radha Acosta RN  Pain Management Interventions: distraction     Problem: Risk for Delirium  Goal: Improved Behavioral Control  Intervention: Minimize Safety Risk  Recent Flowsheet Documentation  Taken 10/10/2023 0109 by Radha Acosta RN  Enhanced Safety Measures: room near unit station  Goal: Improved Attention and Thought Clarity  Intervention: Maximize Cognitive Function  Recent Flowsheet Documentation  Taken 10/10/2023 0109 by Radha Acosta RN  Sensory Stimulation Regulation: television  on  Reorientation Measures: clock in view  Goal: Improved Sleep  Outcome: Progressing     Problem: Risk for Delirium  Goal: Improved Sleep  Outcome: Progressing     Problem: Hip Fracture Surgical Repair  Goal: Optimal Coping with Change in Health Status  Outcome: Progressing  Goal: Cognitive Function Maintained  Intervention: Maintain Cognitive Function  Recent Flowsheet Documentation  Taken 10/10/2023 0109 by Radha Acosta RN  Reorientation Measures: clock in view  Goal: Optimal Functional Ability  Intervention: Promote Optimal Functional Status  Recent Flowsheet Documentation  Taken 10/10/2023 0109 by Radha Acosta RN  Activity Management: bedrest  Goal: Anesthesia/Sedation Recovery  Intervention: Optimize Anesthesia Recovery  Recent Flowsheet Documentation  Taken 10/10/2023 0109 by Radha Acosta RN  Safety Promotion/Fall Prevention:   lighting adjusted   room door open  Reorientation Measures: clock in view  Goal: Optimal Pain Control and Function  Outcome: Progressing  Intervention: Prevent or Manage Pain  Recent Flowsheet Documentation  Taken 10/10/2023 0109 by Radha Acosta RN  Pain Management Interventions: distraction  Goal: Effective Oxygenation and Ventilation  Intervention: Optimize Oxygenation and Ventilation  Recent Flowsheet Documentation  Taken 10/10/2023 0109 by Radha Acosta RN  Head of Bed (HOB) Positioning: HOB at 15 degrees   Goal Outcome Evaluation:Pt is alert and oriented to self but confused with situation and place.  Refused VS taking at the start of shift and uncooperative during bed change due to her advanced dementia condition. Brief and bed change done A2. Slept between cares.

## 2023-10-10 NOTE — PROGRESS NOTES
[facility]    Code Status:  DNR/DNI   Visit Type:   Chief Complaint   Patient presents with    TCU Admission     [facility] 10/3/2023 - 10/10/2023     Facility:  [facility]) [[id number]]         HPI: Shanta Tello is a 83 year old female who I am seeing today for admit to TCU. Pt recently hospitalized on 10/3/23. Past medical history includes advanced dementia,  hypothyroidism, hyperlipidemia and chronic lower extremity edema. Pt with worsening left leg pain X 2 weeks. No fall or trauma. Hx of left femur s/p cephalomedullary nailing on 4/16/23. Imaging showed intramedullary morena fx with one of the screws in the distal femur metaphysis fractured. Pt s/p removal of intramedullary femur morena and left hip bipolar hemiarthroplasty on 10/5/23. Post operative acute blood loss anemia. Hgb trended down to 8.0. Acute urinary retention which resolved. Dementia with behavioral issues.     Transitional Care Course: Today pt lying in bed. Her daughter is present on exam. Pt with advanced dementia. Nursing staff combativeness and resistiveness to cares. She is currently receiving Risperdal and Buspar. There is some question of pain management. She has been receiving tramadol. However pt is unable to report pain.       Assessment/Plan:    Acute left femur fracture  History of left femur # s/p cephalomedullary nailing on 4/16/2023;  - intramedullary morena is fracture, s/p hemiarthroplasty on 10/5/23 and screw removal.   -Increase tramadol to 50 mg TID.   -Tylenol 975 mg Q 8 hours.  -OK for PT, OT.     Acute blood loss anemia   -hgb trended down to 8.   -Follow up CBC.       History of dementia with behavioral issues;  -  sundowning  - Continue PTA BuSpar, Aricept, Risperdal, Zoloft  -Increase Risperdal to BID.     History of hypothyroidism;  -On 8/24, TSH 9.82 (before that around 15), pharmacy reported levothyroxine dose increased.   - Recheck TSH in couple of weeks     History of chronic lower extremity  edema;  - Continue PTA Lasix.    Chronic Kidney Disease   -Follow up BMP.      Active Ambulatory Problems     Diagnosis Date Noted    Closed left hip fracture (H) 04/15/2023    Hypothyroidism 04/15/2023    Hyperlipidemia 04/15/2023    Bilateral lower extremity edema 04/15/2023    DNR (do not resuscitate) 04/16/2023    Status post-operative repair of hip fracture 04/16/2023    Closed fracture of trochanter of left femur, sequela 10/03/2023     Resolved Ambulatory Problems     Diagnosis Date Noted    Dementia (H) 04/15/2023     No Additional Past Medical History     Allergies   Allergen Reactions    Alendronate GI Disturbance     Gastrointestinal reflux    Calcitonin GI Disturbance     Gastrointestinal reflux       All Meds and Allergies reviewed in the record at the facility and is the most up-to-date.    Post Discharge Medication Reconciliation Status: discharge medications reconciled and changed, per note/orders  Current Outpatient Medications   Medication Sig    acetaminophen (TYLENOL) 325 MG tablet Take 3 tablets (975 mg) by mouth every 8 hours    aspirin 81 MG EC tablet Take 1 tablet (81 mg) by mouth 2 times daily    busPIRone (BUSPAR) 7.5 MG tablet Take 7.5 mg by mouth At Bedtime    calcium carbonate-vitamin D (OSCAL) 500-5 MG-MCG tablet Take 1 tablet by mouth daily    donepezil (ARICEPT) 10 MG ODT Take 10 mg by mouth At Bedtime    donepezil (ARICEPT) 5 MG ODT Take 2.5 mg by mouth every morning    furosemide (LASIX) 40 MG tablet Take 40 mg by mouth daily    levothyroxine (SYNTHROID/LEVOTHROID) 75 MCG tablet Take 75 mcg by mouth daily    melatonin 5 MG tablet Take 5 mg by mouth At Bedtime    multivitamin, therapeutic (THERA-VIT) TABS tablet Take 1 tablet by mouth daily    nystatin (MYCOSTATIN) 119101 UNIT/GM external powder Apply topically 2 times daily Wash and dry area under breasts before applying powder    ondansetron (ZOFRAN ODT) 4 MG disintegrating tablet [ONDANSETRON (ZOFRAN ODT) 4 MG DISINTEGRATING  TABLET] Take 1 tablet (4 mg total) by mouth every 8 (eight) hours as needed for nausea.    risperiDONE (RISPERDAL) 0.25 MG tablet Take 0.25 mg by mouth At Bedtime PCP to follow and further GDR.    senna-docusate (SENOKOT-S/PERICOLACE) 8.6-50 MG tablet Take 2 tablets by mouth 2 times daily    sertraline (ZOLOFT) 50 MG tablet Take 50 mg by mouth daily    traMADol (ULTRAM) 50 MG tablet Take 1 tablet (50 mg) by mouth every 6 hours as needed for moderate pain    trolamine salicylate (ASPERCREME) 10 % external cream Apply topically 2 times daily     No current facility-administered medications for this visit.     Facility-Administered Medications Ordered in Other Visits   Medication    donepezil (ARICEPT) half-tab 2.5 mg    QUEtiapine (SEROquel) quarter-tab 6.25 mg    ramelteon (ROZEREM) tablet 8 mg       REVIEW OF SYSTEMS:   10 point review of systems reviewed and pertinent positives in the HPI.     PHYSICAL EXAMINATION:  Physical Exam     Vital signs: There were no vitals taken for this visit.  General: Awake, Alert, oriented x1, rambles nonsensically, does not follow commands.   HEENT:Pink conjunctiva,  moist oral mucosa  NECK: Supple  CVS:  S1  S2, without murmur or gallop.   LUNG: Clear to auscultation, No wheezes, rales or rhonci.  BACK: No kyphosis of the thoracic spine  ABDOMEN: Soft, nontender to palpation, with positive bowel sounds  EXTREMITIES: Moves both upper and lower extremities with diffuse weakness, trace pedal edema, no calf tenderness  SKIN: Incision with surgical bandage, dry and intact.   NEUROLOGIC: Intact, pulses palpable  PSYCHIATRIC: Advanced cognitive impairment.       Labs:  All labs reviewed in the nursing home record and Epic   @  Lab Results   Component Value Date    WBC 11.3 10/04/2023     Lab Results   Component Value Date    RBC 3.81 10/04/2023     Lab Results   Component Value Date    HGB 8.5 10/10/2023     Lab Results   Component Value Date    HCT 38.3 10/04/2023     Lab Results    Component Value Date     10/04/2023     Lab Results   Component Value Date    MCH 32.3 10/04/2023     Lab Results   Component Value Date    MCHC 32.1 10/04/2023     Lab Results   Component Value Date    RDW 12.7 10/04/2023     Lab Results   Component Value Date     10/04/2023        @Last Comprehensive Metabolic Panel:  Sodium   Date Value Ref Range Status   10/06/2023 138 135 - 145 mmol/L Final     Comment:     Reference intervals for this test were updated on 09/26/2023 to more accurately reflect our healthy population. There may be differences in the flagging of prior results with similar values performed with this method. Interpretation of those prior results can be made in the context of the updated reference intervals.      Potassium   Date Value Ref Range Status   10/07/2023 4.8 3.4 - 5.3 mmol/L Final   04/18/2023 4.2 3.5 - 5.0 mmol/L Final     Chloride   Date Value Ref Range Status   10/06/2023 106 98 - 107 mmol/L Final   04/18/2023 109 (H) 98 - 107 mmol/L Final     Carbon Dioxide (CO2)   Date Value Ref Range Status   10/06/2023 26 22 - 29 mmol/L Final   04/18/2023 23 22 - 31 mmol/L Final     Anion Gap   Date Value Ref Range Status   10/06/2023 6 (L) 7 - 15 mmol/L Final   04/18/2023 8 5 - 18 mmol/L Final     Glucose   Date Value Ref Range Status   10/07/2023 110 (H) 70 - 99 mg/dL Final   04/18/2023 94 70 - 125 mg/dL Final     GLUCOSE BY METER POCT   Date Value Ref Range Status   10/06/2023 124 (H) 70 - 99 mg/dL Final     Urea Nitrogen   Date Value Ref Range Status   10/06/2023 22.0 8.0 - 23.0 mg/dL Final   04/18/2023 27 8 - 28 mg/dL Final     Creatinine   Date Value Ref Range Status   10/06/2023 0.87 0.51 - 0.95 mg/dL Final     GFR Estimate   Date Value Ref Range Status   10/06/2023 65 >60 mL/min/1.73m2 Final   07/23/2020 56 (L) >60 mL/min/1.73m2 Final     Calcium   Date Value Ref Range Status   10/06/2023 8.6 (L) 8.8 - 10.2 mg/dL Final     35 minutes spent for visit.      At the conclusion  of the encounter I discussed  the results of all of the tests and the disposition with patient.   All questions were answered.  The patient acknowledged understanding and was involved in the decision making regarding the overall care plan.        This note has been dictated using voice recognition software. Any grammatical or context distortions are unintentional and inherent to the software    Electronically signed by: Wendy Mojica CNP

## 2023-10-11 NOTE — PROGRESS NOTES
Patient discharged to TCU via [facility] wheelchair.  Daughter at bedside at time of discharge.    Diane Olson RN

## 2023-10-11 NOTE — LETTER
10/11/2023        RE: Shanta Tello  [address] [place] [address]        [facility]    Code Status:  DNR/DNI   Visit Type:   Chief Complaint   Patient presents with     TCU Admission     [facility] 10/3/2023 - 10/10/2023     Facility:  [facility]) [[id number]]         HPI: Shanta Tello is a 83 year old female who I am seeing today for admit to TCU. Pt recently hospitalized on 10/3/23. Past medical history includes advanced dementia,  hypothyroidism, hyperlipidemia and chronic lower extremity edema. Pt with worsening left leg pain X 2 weeks. No fall or trauma. Hx of left femur s/p cephalomedullary nailing on 4/16/23. Imaging showed intramedullary morena fx with one of the screws in the distal femur metaphysis fractured. Pt s/p removal of intramedullary femur morena and left hip bipolar hemiarthroplasty on 10/5/23. Post operative acute blood loss anemia. Hgb trended down to 8.0. Acute urinary retention which resolved. Dementia with behavioral issues.     Transitional Care Course: Today pt lying in bed. Her daughter is present on exam. Pt with advanced dementia. Nursing staff combativeness and resistiveness to cares. She is currently receiving Risperdal and Buspar. There is some question of pain management. She has been receiving tramadol. However pt is unable to report pain.       Assessment/Plan:    Acute left femur fracture  History of left femur # s/p cephalomedullary nailing on 4/16/2023;  - intramedullary morena is fracture, s/p hemiarthroplasty on 10/5/23 and screw removal.   -Increase tramadol to 50 mg TID.   -Tylenol 975 mg Q 8 hours.  -OK for PT, OT.     Acute blood loss anemia   -hgb trended down to 8.   -Follow up CBC.       History of dementia with behavioral issues;  -  sundowning  - Continue PTA BuSpar, Aricept, Risperdal, Zoloft  -Increase Risperdal to BID.     History of hypothyroidism;  -On 8/24, TSH 9.82 (before that around 15), pharmacy reported levothyroxine  dose increased.   - Recheck TSH in couple of weeks     History of chronic lower extremity edema;  - Continue PTA Lasix.    Chronic Kidney Disease   -Follow up BMP.      Active Ambulatory Problems     Diagnosis Date Noted     Closed left hip fracture (H) 04/15/2023     Hypothyroidism 04/15/2023     Hyperlipidemia 04/15/2023     Bilateral lower extremity edema 04/15/2023     DNR (do not resuscitate) 04/16/2023     Status post-operative repair of hip fracture 04/16/2023     Closed fracture of trochanter of left femur, sequela 10/03/2023     Resolved Ambulatory Problems     Diagnosis Date Noted     Dementia (H) 04/15/2023     No Additional Past Medical History     Allergies   Allergen Reactions     Alendronate GI Disturbance     Gastrointestinal reflux     Calcitonin GI Disturbance     Gastrointestinal reflux       All Meds and Allergies reviewed in the record at the facility and is the most up-to-date.    Post Discharge Medication Reconciliation Status: discharge medications reconciled and changed, per note/orders  Current Outpatient Medications   Medication Sig     acetaminophen (TYLENOL) 325 MG tablet Take 3 tablets (975 mg) by mouth every 8 hours     aspirin 81 MG EC tablet Take 1 tablet (81 mg) by mouth 2 times daily     busPIRone (BUSPAR) 7.5 MG tablet Take 7.5 mg by mouth At Bedtime     calcium carbonate-vitamin D (OSCAL) 500-5 MG-MCG tablet Take 1 tablet by mouth daily     donepezil (ARICEPT) 10 MG ODT Take 10 mg by mouth At Bedtime     donepezil (ARICEPT) 5 MG ODT Take 2.5 mg by mouth every morning     furosemide (LASIX) 40 MG tablet Take 40 mg by mouth daily     levothyroxine (SYNTHROID/LEVOTHROID) 75 MCG tablet Take 75 mcg by mouth daily     melatonin 5 MG tablet Take 5 mg by mouth At Bedtime     multivitamin, therapeutic (THERA-VIT) TABS tablet Take 1 tablet by mouth daily     nystatin (MYCOSTATIN) 480484 UNIT/GM external powder Apply topically 2 times daily Wash and dry area under breasts before applying  powder     ondansetron (ZOFRAN ODT) 4 MG disintegrating tablet [ONDANSETRON (ZOFRAN ODT) 4 MG DISINTEGRATING TABLET] Take 1 tablet (4 mg total) by mouth every 8 (eight) hours as needed for nausea.     risperiDONE (RISPERDAL) 0.25 MG tablet Take 0.25 mg by mouth At Bedtime PCP to follow and further GDR.     senna-docusate (SENOKOT-S/PERICOLACE) 8.6-50 MG tablet Take 2 tablets by mouth 2 times daily     sertraline (ZOLOFT) 50 MG tablet Take 50 mg by mouth daily     traMADol (ULTRAM) 50 MG tablet Take 1 tablet (50 mg) by mouth every 6 hours as needed for moderate pain     trolamine salicylate (ASPERCREME) 10 % external cream Apply topically 2 times daily     No current facility-administered medications for this visit.     Facility-Administered Medications Ordered in Other Visits   Medication     donepezil (ARICEPT) half-tab 2.5 mg     QUEtiapine (SEROquel) quarter-tab 6.25 mg     ramelteon (ROZEREM) tablet 8 mg       REVIEW OF SYSTEMS:   10 point review of systems reviewed and pertinent positives in the HPI.     PHYSICAL EXAMINATION:  Physical Exam     Vital signs: There were no vitals taken for this visit.  General: Awake, Alert, oriented x1, rambles nonsensically, does not follow commands.   HEENT:Pink conjunctiva,  moist oral mucosa  NECK: Supple  CVS:  S1  S2, without murmur or gallop.   LUNG: Clear to auscultation, No wheezes, rales or rhonci.  BACK: No kyphosis of the thoracic spine  ABDOMEN: Soft, nontender to palpation, with positive bowel sounds  EXTREMITIES: Moves both upper and lower extremities with diffuse weakness, trace pedal edema, no calf tenderness  SKIN: Incision with surgical bandage, dry and intact.   NEUROLOGIC: Intact, pulses palpable  PSYCHIATRIC: Advanced cognitive impairment.       Labs:  All labs reviewed in the nursing home record and Epic   @  Lab Results   Component Value Date    WBC 11.3 10/04/2023     Lab Results   Component Value Date    RBC 3.81 10/04/2023     Lab Results   Component  Value Date    HGB 8.5 10/10/2023     Lab Results   Component Value Date    HCT 38.3 10/04/2023     Lab Results   Component Value Date     10/04/2023     Lab Results   Component Value Date    MCH 32.3 10/04/2023     Lab Results   Component Value Date    MCHC 32.1 10/04/2023     Lab Results   Component Value Date    RDW 12.7 10/04/2023     Lab Results   Component Value Date     10/04/2023        @Last Comprehensive Metabolic Panel:  Sodium   Date Value Ref Range Status   10/06/2023 138 135 - 145 mmol/L Final     Comment:     Reference intervals for this test were updated on 09/26/2023 to more accurately reflect our healthy population. There may be differences in the flagging of prior results with similar values performed with this method. Interpretation of those prior results can be made in the context of the updated reference intervals.      Potassium   Date Value Ref Range Status   10/07/2023 4.8 3.4 - 5.3 mmol/L Final   04/18/2023 4.2 3.5 - 5.0 mmol/L Final     Chloride   Date Value Ref Range Status   10/06/2023 106 98 - 107 mmol/L Final   04/18/2023 109 (H) 98 - 107 mmol/L Final     Carbon Dioxide (CO2)   Date Value Ref Range Status   10/06/2023 26 22 - 29 mmol/L Final   04/18/2023 23 22 - 31 mmol/L Final     Anion Gap   Date Value Ref Range Status   10/06/2023 6 (L) 7 - 15 mmol/L Final   04/18/2023 8 5 - 18 mmol/L Final     Glucose   Date Value Ref Range Status   10/07/2023 110 (H) 70 - 99 mg/dL Final   04/18/2023 94 70 - 125 mg/dL Final     GLUCOSE BY METER POCT   Date Value Ref Range Status   10/06/2023 124 (H) 70 - 99 mg/dL Final     Urea Nitrogen   Date Value Ref Range Status   10/06/2023 22.0 8.0 - 23.0 mg/dL Final   04/18/2023 27 8 - 28 mg/dL Final     Creatinine   Date Value Ref Range Status   10/06/2023 0.87 0.51 - 0.95 mg/dL Final     GFR Estimate   Date Value Ref Range Status   10/06/2023 65 >60 mL/min/1.73m2 Final   07/23/2020 56 (L) >60 mL/min/1.73m2 Final     Calcium   Date Value Ref  Range Status   10/06/2023 8.6 (L) 8.8 - 10.2 mg/dL Final     35 minutes spent for visit.      At the conclusion of the encounter I discussed  the results of all of the tests and the disposition with patient.   All questions were answered.  The patient acknowledged understanding and was involved in the decision making regarding the overall care plan.        This note has been dictated using voice recognition software. Any grammatical or context distortions are unintentional and inherent to the software    Electronically signed by: Wendy Mojica CNP       Sincerely,        Wendy Mojica NP

## 2023-10-16 NOTE — LETTER
10/16/2023        RE: Shanta Tello  [address] [facility]  [place] [address]        [facility]    Code Status:  DNR/DNI   Visit Type:   Chief Complaint   Patient presents with     TCU Follow Up     Facility:  [facility]) [[id number]]         HPI: Shanta Tello is a 83 year old female who I am seeing today for follow up on the  TCU. Pt recently hospitalized on 10/3/23. Past medical history includes advanced dementia,  hypothyroidism, hyperlipidemia and chronic lower extremity edema. Pt with worsening left leg pain X 2 weeks. No fall or trauma. Hx of left femur s/p cephalomedullary nailing on 4/16/23. Imaging showed intramedullary morena fx with one of the screws in the distal femur metaphysis fractured. Pt s/p removal of intramedullary femur morena and left hip bipolar hemiarthroplasty on 10/5/23. Post operative acute blood loss anemia. Hgb trended down to 8.0. Acute urinary retention which resolved. Dementia with behavioral issues.     Transitional Care Course: Today pt sitting up in wheelchair. Pt with advanced dementia with behaviors. She continues with sundowning and combativeness with cares.She is currently receiving Risperdal and Buspar. She continues on tramadol for pain TID. Today she swats at me on physical exam.       Assessment/Plan:    Acute left femur fracture  History of left femur # s/p cephalomedullary nailing on 4/16/2023;  - intramedullary morena is fracture, s/p hemiarthroplasty on 10/5/23 and screw removal.   -tramadol to 50 mg TID.   -Tylenol 975 mg Q 8 hours.    Acute blood loss anemia   -hgb now 8.9.   -Monitor.       History of dementia with behavioral issues;  -  sundowning with combativeness.   - Continue PTA BuSpar, Aricept, Risperdal, Zoloft  -Increase Risperdal to 0.25 mg BID.     History of hypothyroidism;  -On 8/24, TSH 9.82 (before that around 15), pharmacy reported levothyroxine dose increased.   - Recheck TSH in couple of weeks     History of chronic lower  extremity edema;  - Continue PTA Lasix.    Chronic Kidney Disease   -Follow up BMP unremarkable.       Active Ambulatory Problems     Diagnosis Date Noted     Closed left hip fracture (H) 04/15/2023     Hypothyroidism 04/15/2023     Hyperlipidemia 04/15/2023     Bilateral lower extremity edema 04/15/2023     DNR (do not resuscitate) 04/16/2023     Status post-operative repair of hip fracture 04/16/2023     Closed fracture of trochanter of left femur, sequela 10/03/2023     Resolved Ambulatory Problems     Diagnosis Date Noted     Dementia (H) 04/15/2023     No Additional Past Medical History     Allergies   Allergen Reactions     Alendronate GI Disturbance     Gastrointestinal reflux     Calcitonin GI Disturbance     Gastrointestinal reflux       All Meds and Allergies reviewed in the record at the facility and is the most up-to-date.    Current Outpatient Medications   Medication Sig     acetaminophen (TYLENOL) 325 MG tablet Take 3 tablets (975 mg) by mouth every 8 hours     aspirin 81 MG EC tablet Take 1 tablet (81 mg) by mouth 2 times daily     busPIRone (BUSPAR) 7.5 MG tablet Take 7.5 mg by mouth At Bedtime     calcium carbonate-vitamin D (OSCAL) 500-5 MG-MCG tablet Take 1 tablet by mouth daily     donepezil (ARICEPT) 10 MG ODT Take 10 mg by mouth At Bedtime     donepezil (ARICEPT) 5 MG ODT Take 2.5 mg by mouth every morning     furosemide (LASIX) 40 MG tablet Take 40 mg by mouth daily     levothyroxine (SYNTHROID/LEVOTHROID) 75 MCG tablet Take 75 mcg by mouth daily     melatonin 5 MG tablet Take 5 mg by mouth At Bedtime     multivitamin, therapeutic (THERA-VIT) TABS tablet Take 1 tablet by mouth daily     nystatin (MYCOSTATIN) 724858 UNIT/GM external powder Apply topically 2 times daily Wash and dry area under breasts before applying powder     ondansetron (ZOFRAN ODT) 4 MG disintegrating tablet [ONDANSETRON (ZOFRAN ODT) 4 MG DISINTEGRATING TABLET] Take 1 tablet (4 mg total) by mouth every 8 (eight) hours as  needed for nausea.     risperiDONE (RISPERDAL) 0.25 MG tablet Take 0.25 mg by mouth 2 times daily PCP to follow and further GDR.     senna-docusate (SENOKOT-S/PERICOLACE) 8.6-50 MG tablet Take 2 tablets by mouth 2 times daily     sertraline (ZOLOFT) 50 MG tablet Take 50 mg by mouth daily     traMADol (ULTRAM) 50 MG tablet Take 1 tablet (50 mg) by mouth every 6 hours as needed for moderate pain (Patient taking differently: Take 50 mg by mouth every 8 hours)     trolamine salicylate (ASPERCREME) 10 % external cream Apply topically 2 times daily     No current facility-administered medications for this visit.     Facility-Administered Medications Ordered in Other Visits   Medication     donepezil (ARICEPT) half-tab 2.5 mg     QUEtiapine (SEROquel) quarter-tab 6.25 mg     ramelteon (ROZEREM) tablet 8 mg       REVIEW OF SYSTEMS:   10 point review of systems reviewed and pertinent positives in the HPI.     PHYSICAL EXAMINATION:  Physical Exam     Vital signs: /70   Pulse (!) 164   Temp 98  F (36.7  C)   Resp 16   Ht 1.524 m (5')   Wt 75.8 kg (167 lb 3.2 oz)   SpO2 (!) 76%   BMI 32.65 kg/m    General: Awake, Alert, oriented x1, rambles nonsensically, does not follow commands.   HEENT:Pink conjunctiva,  moist oral mucosa  NECK: Supple  CVS:  S1  S2, without murmur or gallop.   LUNG: Clear to auscultation, No wheezes, rales or rhonci.  BACK: No kyphosis of the thoracic spine  ABDOMEN: Soft, nontender to palpation, with positive bowel sounds  EXTREMITIES: Moves both upper and lower extremities with diffuse weakness, trace pedal edema, no calf tenderness  NEUROLOGIC: Intact, pulses palpable  PSYCHIATRIC: Advanced cognitive impairment. Swats at me with physical exam.        Labs:  All labs reviewed in the nursing home record and [employer]   @  Lab Results   Component Value Date    WBC 11.3 10/04/2023     Lab Results   Component Value Date    RBC 3.81 10/04/2023     Lab Results   Component Value Date    HGB 8.5  10/10/2023     Lab Results   Component Value Date    HCT 38.3 10/04/2023     Lab Results   Component Value Date     10/04/2023     Lab Results   Component Value Date    MCH 32.3 10/04/2023     Lab Results   Component Value Date    MCHC 32.1 10/04/2023     Lab Results   Component Value Date    RDW 12.7 10/04/2023     Lab Results   Component Value Date     10/04/2023        @Last Comprehensive Metabolic Panel:  Sodium   Date Value Ref Range Status   10/16/2023 141 135 - 145 mmol/L Final     Comment:     Reference intervals for this test were updated on 09/26/2023 to more accurately reflect our healthy population. There may be differences in the flagging of prior results with similar values performed with this method. Interpretation of those prior results can be made in the context of the updated reference intervals.      Potassium   Date Value Ref Range Status   10/16/2023 4.4 3.4 - 5.3 mmol/L Final   04/18/2023 4.2 3.5 - 5.0 mmol/L Final     Chloride   Date Value Ref Range Status   10/16/2023 105 98 - 107 mmol/L Final   04/18/2023 109 (H) 98 - 107 mmol/L Final     Carbon Dioxide (CO2)   Date Value Ref Range Status   10/16/2023 25 22 - 29 mmol/L Final   04/18/2023 23 22 - 31 mmol/L Final     Anion Gap   Date Value Ref Range Status   10/16/2023 11 7 - 15 mmol/L Final   04/18/2023 8 5 - 18 mmol/L Final     Glucose   Date Value Ref Range Status   10/16/2023 94 70 - 99 mg/dL Final   04/18/2023 94 70 - 125 mg/dL Final     GLUCOSE BY METER POCT   Date Value Ref Range Status   10/06/2023 124 (H) 70 - 99 mg/dL Final     Urea Nitrogen   Date Value Ref Range Status   10/16/2023 26.6 (H) 8.0 - 23.0 mg/dL Final   04/18/2023 27 8 - 28 mg/dL Final     Creatinine   Date Value Ref Range Status   10/16/2023 1.08 (H) 0.51 - 0.95 mg/dL Final     GFR Estimate   Date Value Ref Range Status   10/16/2023 50 (L) >60 mL/min/1.73m2 Final   07/23/2020 56 (L) >60 mL/min/1.73m2 Final     Calcium   Date Value Ref Range Status    10/16/2023 9.0 8.8 - 10.2 mg/dL Final          At the conclusion of the encounter I discussed  the results of all of the tests and the disposition with patient.   All questions were answered.  The patient acknowledged understanding and was involved in the decision making regarding the overall care plan.        This note has been dictated using voice recognition software. Any grammatical or context distortions are unintentional and inherent to the software    Electronically signed by: Wendy Mojica CNP       Sincerely,        Wendy Mojica NP

## 2023-10-17 NOTE — PROGRESS NOTES
[facility]    Code Status:  DNR/DNI   Visit Type:   Chief Complaint   Patient presents with    TCU Follow Up     Facility:  [facility] ([facility]) [[id number]]         HPI: Shanta Tello is a 83 year old female who I am seeing today for follow up on the  TCU. Pt recently hospitalized on 10/3/23. Past medical history includes advanced dementia,  hypothyroidism, hyperlipidemia and chronic lower extremity edema. Pt with worsening left leg pain X 2 weeks. No fall or trauma. Hx of left femur s/p cephalomedullary nailing on 4/16/23. Imaging showed intramedullary morena fx with one of the screws in the distal femur metaphysis fractured. Pt s/p removal of intramedullary femur morena and left hip bipolar hemiarthroplasty on 10/5/23. Post operative acute blood loss anemia. Hgb trended down to 8.0. Acute urinary retention which resolved. Dementia with behavioral issues.     Transitional Care Course: Today pt sitting up in wheelchair. Pt with advanced dementia with behaviors. She continues with sundowning and combativeness with cares.She is currently receiving Risperdal and Buspar. She continues on tramadol for pain TID. Today she swats at me on physical exam.       Assessment/Plan:    Acute left femur fracture  History of left femur # s/p cephalomedullary nailing on 4/16/2023;  - intramedullary morena is fracture, s/p hemiarthroplasty on 10/5/23 and screw removal.   -tramadol to 50 mg TID.   -Tylenol 975 mg Q 8 hours.    Acute blood loss anemia   -hgb now 8.9.   -Monitor.       History of dementia with behavioral issues;  -  sundowning with combativeness.   - Continue PTA BuSpar, Aricept, Risperdal, Zoloft  -Increase Risperdal to 0.25 mg BID.     History of hypothyroidism;  -On 8/24, TSH 9.82 (before that around 15), pharmacy reported levothyroxine dose increased.   - Recheck TSH in couple of weeks     History of chronic lower extremity edema;  - Continue PTA Lasix.    Chronic Kidney Disease   -Follow up BMP  unremarkable.       Active Ambulatory Problems     Diagnosis Date Noted    Closed left hip fracture (H) 04/15/2023    Hypothyroidism 04/15/2023    Hyperlipidemia 04/15/2023    Bilateral lower extremity edema 04/15/2023    DNR (do not resuscitate) 04/16/2023    Status post-operative repair of hip fracture 04/16/2023    Closed fracture of trochanter of left femur, sequela 10/03/2023     Resolved Ambulatory Problems     Diagnosis Date Noted    Dementia (H) 04/15/2023     No Additional Past Medical History     Allergies   Allergen Reactions    Alendronate GI Disturbance     Gastrointestinal reflux    Calcitonin GI Disturbance     Gastrointestinal reflux       All Meds and Allergies reviewed in the record at the facility and is the most up-to-date.    Current Outpatient Medications   Medication Sig    acetaminophen (TYLENOL) 325 MG tablet Take 3 tablets (975 mg) by mouth every 8 hours    aspirin 81 MG EC tablet Take 1 tablet (81 mg) by mouth 2 times daily    busPIRone (BUSPAR) 7.5 MG tablet Take 7.5 mg by mouth At Bedtime    calcium carbonate-vitamin D (OSCAL) 500-5 MG-MCG tablet Take 1 tablet by mouth daily    donepezil (ARICEPT) 10 MG ODT Take 10 mg by mouth At Bedtime    donepezil (ARICEPT) 5 MG ODT Take 2.5 mg by mouth every morning    furosemide (LASIX) 40 MG tablet Take 40 mg by mouth daily    levothyroxine (SYNTHROID/LEVOTHROID) 75 MCG tablet Take 75 mcg by mouth daily    melatonin 5 MG tablet Take 5 mg by mouth At Bedtime    multivitamin, therapeutic (THERA-VIT) TABS tablet Take 1 tablet by mouth daily    nystatin (MYCOSTATIN) 533588 UNIT/GM external powder Apply topically 2 times daily Wash and dry area under breasts before applying powder    ondansetron (ZOFRAN ODT) 4 MG disintegrating tablet [ONDANSETRON (ZOFRAN ODT) 4 MG DISINTEGRATING TABLET] Take 1 tablet (4 mg total) by mouth every 8 (eight) hours as needed for nausea.    risperiDONE (RISPERDAL) 0.25 MG tablet Take 0.25 mg by mouth 2 times daily PCP to  follow and further GDR.    senna-docusate (SENOKOT-S/PERICOLACE) 8.6-50 MG tablet Take 2 tablets by mouth 2 times daily    sertraline (ZOLOFT) 50 MG tablet Take 50 mg by mouth daily    traMADol (ULTRAM) 50 MG tablet Take 1 tablet (50 mg) by mouth every 6 hours as needed for moderate pain (Patient taking differently: Take 50 mg by mouth every 8 hours)    trolamine salicylate (ASPERCREME) 10 % external cream Apply topically 2 times daily     No current facility-administered medications for this visit.     Facility-Administered Medications Ordered in Other Visits   Medication    donepezil (ARICEPT) half-tab 2.5 mg    QUEtiapine (SEROquel) quarter-tab 6.25 mg    ramelteon (ROZEREM) tablet 8 mg       REVIEW OF SYSTEMS:   10 point review of systems reviewed and pertinent positives in the HPI.     PHYSICAL EXAMINATION:  Physical Exam     Vital signs: /70   Pulse (!) 164   Temp 98  F (36.7  C)   Resp 16   Ht 1.524 m (5')   Wt 75.8 kg (167 lb 3.2 oz)   SpO2 (!) 76%   BMI 32.65 kg/m    General: Awake, Alert, oriented x1, rambles nonsensically, does not follow commands.   HEENT:Pink conjunctiva,  moist oral mucosa  NECK: Supple  CVS:  S1  S2, without murmur or gallop.   LUNG: Clear to auscultation, No wheezes, rales or rhonci.  BACK: No kyphosis of the thoracic spine  ABDOMEN: Soft, nontender to palpation, with positive bowel sounds  EXTREMITIES: Moves both upper and lower extremities with diffuse weakness, trace pedal edema, no calf tenderness  NEUROLOGIC: Intact, pulses palpable  PSYCHIATRIC: Advanced cognitive impairment. Swats at me with physical exam.        Labs:  All labs reviewed in the nursing home record and [facility]   @  Lab Results   Component Value Date    WBC 11.3 10/04/2023     Lab Results   Component Value Date    RBC 3.81 10/04/2023     Lab Results   Component Value Date    HGB 8.5 10/10/2023     Lab Results   Component Value Date    HCT 38.3 10/04/2023     Lab Results   Component Value Date    MCV  101 10/04/2023     Lab Results   Component Value Date    MCH 32.3 10/04/2023     Lab Results   Component Value Date    MCHC 32.1 10/04/2023     Lab Results   Component Value Date    RDW 12.7 10/04/2023     Lab Results   Component Value Date     10/04/2023        @Last Comprehensive Metabolic Panel:  Sodium   Date Value Ref Range Status   10/16/2023 141 135 - 145 mmol/L Final     Comment:     Reference intervals for this test were updated on 09/26/2023 to more accurately reflect our healthy population. There may be differences in the flagging of prior results with similar values performed with this method. Interpretation of those prior results can be made in the context of the updated reference intervals.      Potassium   Date Value Ref Range Status   10/16/2023 4.4 3.4 - 5.3 mmol/L Final   04/18/2023 4.2 3.5 - 5.0 mmol/L Final     Chloride   Date Value Ref Range Status   10/16/2023 105 98 - 107 mmol/L Final   04/18/2023 109 (H) 98 - 107 mmol/L Final     Carbon Dioxide (CO2)   Date Value Ref Range Status   10/16/2023 25 22 - 29 mmol/L Final   04/18/2023 23 22 - 31 mmol/L Final     Anion Gap   Date Value Ref Range Status   10/16/2023 11 7 - 15 mmol/L Final   04/18/2023 8 5 - 18 mmol/L Final     Glucose   Date Value Ref Range Status   10/16/2023 94 70 - 99 mg/dL Final   04/18/2023 94 70 - 125 mg/dL Final     GLUCOSE BY METER POCT   Date Value Ref Range Status   10/06/2023 124 (H) 70 - 99 mg/dL Final     Urea Nitrogen   Date Value Ref Range Status   10/16/2023 26.6 (H) 8.0 - 23.0 mg/dL Final   04/18/2023 27 8 - 28 mg/dL Final     Creatinine   Date Value Ref Range Status   10/16/2023 1.08 (H) 0.51 - 0.95 mg/dL Final     GFR Estimate   Date Value Ref Range Status   10/16/2023 50 (L) >60 mL/min/1.73m2 Final   07/23/2020 56 (L) >60 mL/min/1.73m2 Final     Calcium   Date Value Ref Range Status   10/16/2023 9.0 8.8 - 10.2 mg/dL Final          At the conclusion of the encounter I discussed  the results of all of the  tests and the disposition with patient.   All questions were answered.  The patient acknowledged understanding and was involved in the decision making regarding the overall care plan.        This note has been dictated using voice recognition software. Any grammatical or context distortions are unintentional and inherent to the software    Electronically signed by: Wendy Mojica CNP

## 2023-10-18 NOTE — LETTER
10/18/2023        RE: Shanta Tello  [id number] [facility]  [place] [address]        [facility]    Code Status:  DNR/DNI   Visit Type:   Chief Complaint   Patient presents with     TCU Follow Up     Facility:  [facility]) [[id number]]         HPI: Shanta Tello is a 83 year old female who I am seeing today for follow up on the  TCU. Pt recently hospitalized on 10/3/23. Past medical history includes advanced dementia,  hypothyroidism, hyperlipidemia and chronic lower extremity edema. Pt with worsening left leg pain X 2 weeks. No fall or trauma. Hx of left femur s/p cephalomedullary nailing on 4/16/23. Imaging showed intramedullary morena fx with one of the screws in the distal femur metaphysis fractured. Pt s/p removal of intramedullary femur morena and left hip bipolar hemiarthroplasty on 10/5/23. Post operative acute blood loss anemia. Hgb trended down to 8.0. Acute urinary retention which resolved. Dementia with behavioral issues.     Transitional Care Course: Today pt sitting up in wheelchair. Pt with advanced dementia with behaviors. Nursing staff and therapy report pt exhibits signs and symptoms of pain. With any movement she becomes combative. She is progressing slowly. She is unable to verbalize pain. She is currently on Tramadol 25 mg Q 8 hours. She is on Risperdal and Buspar for behaviors. She is eating well and her bowels are moving regularly.         Assessment/Plan:    Acute left femur fracture  History of left femur # s/p cephalomedullary nailing on 4/16/2023;  - intramedullary morena is fracture, s/p hemiarthroplasty on 10/5/23 and screw removal.   -Increase tramadol to 25 mg Q 6 hours.    -Tylenol 975 mg Q 8 hours.    Acute blood loss anemia   -hgb now 8.9.   -Monitor.       History of dementia with behavioral issues;  -  sundowning with combativeness.   - Continue PTA BuSpar, Aricept, Risperdal, Zoloft  -Increase Risperdal to 0.25 mg BID.     History of hypothyroidism;  -On  8/24, TSH 9.82 (before that around 15), pharmacy reported levothyroxine dose increased.   - Recheck TSH in 2 weeks     History of chronic lower extremity edema;  - Continue PTA Lasix.    Chronic Kidney Disease   -Follow up BMP unremarkable.       Active Ambulatory Problems     Diagnosis Date Noted     Closed left hip fracture (H) 04/15/2023     Hypothyroidism 04/15/2023     Hyperlipidemia 04/15/2023     Bilateral lower extremity edema 04/15/2023     DNR (do not resuscitate) 04/16/2023     Status post-operative repair of hip fracture 04/16/2023     Closed fracture of trochanter of left femur, sequela 10/03/2023     Resolved Ambulatory Problems     Diagnosis Date Noted     Dementia (H) 04/15/2023     No Additional Past Medical History     Allergies   Allergen Reactions     Alendronate GI Disturbance     Gastrointestinal reflux     Calcitonin GI Disturbance     Gastrointestinal reflux       All Meds and Allergies reviewed in the record at the facility and is the most up-to-date.    Current Outpatient Medications   Medication Sig     acetaminophen (TYLENOL) 325 MG tablet Take 3 tablets (975 mg) by mouth every 8 hours     aspirin 81 MG EC tablet Take 1 tablet (81 mg) by mouth 2 times daily     busPIRone (BUSPAR) 7.5 MG tablet Take 7.5 mg by mouth At Bedtime     calcium carbonate-vitamin D (OSCAL) 500-5 MG-MCG tablet Take 1 tablet by mouth daily     donepezil (ARICEPT) 10 MG ODT Take 10 mg by mouth At Bedtime     donepezil (ARICEPT) 5 MG ODT Take 2.5 mg by mouth every morning     furosemide (LASIX) 40 MG tablet Take 40 mg by mouth daily     levothyroxine (SYNTHROID/LEVOTHROID) 75 MCG tablet Take 75 mcg by mouth daily     melatonin 5 MG tablet Take 5 mg by mouth At Bedtime     multivitamin, therapeutic (THERA-VIT) TABS tablet Take 1 tablet by mouth daily     nystatin (MYCOSTATIN) 410155 UNIT/GM external powder Apply topically 2 times daily Wash and dry area under breasts before applying powder     ondansetron (ZOFRAN ODT)  4 MG disintegrating tablet [ONDANSETRON (ZOFRAN ODT) 4 MG DISINTEGRATING TABLET] Take 1 tablet (4 mg total) by mouth every 8 (eight) hours as needed for nausea.     risperiDONE (RISPERDAL) 0.25 MG tablet Take 0.25 mg by mouth 2 times daily PCP to follow and further GDR.     senna-docusate (SENOKOT-S/PERICOLACE) 8.6-50 MG tablet Take 2 tablets by mouth 2 times daily     sertraline (ZOLOFT) 50 MG tablet Take 50 mg by mouth daily     traMADol (ULTRAM) 50 MG tablet Take 1 tablet (50 mg) by mouth every 6 hours as needed for moderate pain (Patient taking differently: Take 50 mg by mouth every 6 hours)     trolamine salicylate (ASPERCREME) 10 % external cream Apply topically 2 times daily     No current facility-administered medications for this visit.     Facility-Administered Medications Ordered in Other Visits   Medication     donepezil (ARICEPT) half-tab 2.5 mg     QUEtiapine (SEROquel) quarter-tab 6.25 mg     ramelteon (ROZEREM) tablet 8 mg       REVIEW OF SYSTEMS:   10 point review of systems reviewed and pertinent positives in the HPI.     PHYSICAL EXAMINATION:  Physical Exam     Vital signs: /68   Pulse 95   Temp 98  F (36.7  C)   Resp 18   Ht 1.524 m (5')   Wt 71.5 kg (157 lb 9.6 oz)   SpO2 95%   BMI 30.78 kg/m    General: Awake, Alert, oriented x1, rambles nonsensically, does not follow commands.   HEENT:Pink conjunctiva,  moist oral mucosa  NECK: Supple  CVS:  S1  S2, without murmur or gallop.   LUNG: Clear to auscultation, No wheezes, rales or rhonci.  BACK: No kyphosis of the thoracic spine  ABDOMEN: Soft, nontender to palpation, with positive bowel sounds  EXTREMITIES: Moves both upper and lower extremities with diffuse weakness, trace pedal edema, no calf tenderness  NEUROLOGIC: Intact, pulses palpable  PSYCHIATRIC: Advanced cognitive impairment.      Labs:  All labs reviewed in the nursing home record and [facility]   @  Lab Results   Component Value Date    WBC 11.3 10/04/2023     Lab Results    Component Value Date    RBC 3.81 10/04/2023     Lab Results   Component Value Date    HGB 8.5 10/10/2023     Lab Results   Component Value Date    HCT 38.3 10/04/2023     Lab Results   Component Value Date     10/04/2023     Lab Results   Component Value Date    MCH 32.3 10/04/2023     Lab Results   Component Value Date    MCHC 32.1 10/04/2023     Lab Results   Component Value Date    RDW 12.7 10/04/2023     Lab Results   Component Value Date     10/04/2023        @Last Comprehensive Metabolic Panel:  Sodium   Date Value Ref Range Status   10/16/2023 141 135 - 145 mmol/L Final     Comment:     Reference intervals for this test were updated on 09/26/2023 to more accurately reflect our healthy population. There may be differences in the flagging of prior results with similar values performed with this method. Interpretation of those prior results can be made in the context of the updated reference intervals.      Potassium   Date Value Ref Range Status   10/16/2023 4.4 3.4 - 5.3 mmol/L Final   04/18/2023 4.2 3.5 - 5.0 mmol/L Final     Chloride   Date Value Ref Range Status   10/16/2023 105 98 - 107 mmol/L Final   04/18/2023 109 (H) 98 - 107 mmol/L Final     Carbon Dioxide (CO2)   Date Value Ref Range Status   10/16/2023 25 22 - 29 mmol/L Final   04/18/2023 23 22 - 31 mmol/L Final     Anion Gap   Date Value Ref Range Status   10/16/2023 11 7 - 15 mmol/L Final   04/18/2023 8 5 - 18 mmol/L Final     Glucose   Date Value Ref Range Status   10/16/2023 94 70 - 99 mg/dL Final   04/18/2023 94 70 - 125 mg/dL Final     GLUCOSE BY METER POCT   Date Value Ref Range Status   10/06/2023 124 (H) 70 - 99 mg/dL Final     Urea Nitrogen   Date Value Ref Range Status   10/16/2023 26.6 (H) 8.0 - 23.0 mg/dL Final   04/18/2023 27 8 - 28 mg/dL Final     Creatinine   Date Value Ref Range Status   10/16/2023 1.08 (H) 0.51 - 0.95 mg/dL Final     GFR Estimate   Date Value Ref Range Status   10/16/2023 50 (L) >60 mL/min/1.73m2  Final   07/23/2020 56 (L) >60 mL/min/1.73m2 Final     Calcium   Date Value Ref Range Status   10/16/2023 9.0 8.8 - 10.2 mg/dL Final          At the conclusion of the encounter I discussed  the results of all of the tests and the disposition with patient.   All questions were answered.  The patient acknowledged understanding and was involved in the decision making regarding the overall care plan.        This note has been dictated using voice recognition software. Any grammatical or context distortions are unintentional and inherent to the software    Electronically signed by: Wendy Mojica CNP       Sincerely,        Wendy Mojica NP

## 2023-10-19 NOTE — PROGRESS NOTES
[facility]    Code Status:  DNR/DNI   Visit Type:   Chief Complaint   Patient presents with    TCU Follow Up     Facility:  [facility] ([facility]) [[id number]]         HPI: Shanta Tello is a 83 year old female who I am seeing today for follow up on the  TCU. Pt recently hospitalized on 10/3/23. Past medical history includes advanced dementia,  hypothyroidism, hyperlipidemia and chronic lower extremity edema. Pt with worsening left leg pain X 2 weeks. No fall or trauma. Hx of left femur s/p cephalomedullary nailing on 4/16/23. Imaging showed intramedullary morena fx with one of the screws in the distal femur metaphysis fractured. Pt s/p removal of intramedullary femur morena and left hip bipolar hemiarthroplasty on 10/5/23. Post operative acute blood loss anemia. Hgb trended down to 8.0. Acute urinary retention which resolved. Dementia with behavioral issues.     Transitional Care Course: Today pt sitting up in wheelchair. Pt with advanced dementia with behaviors. Nursing staff and therapy report pt exhibits signs and symptoms of pain. With any movement she becomes combative. She is progressing slowly. She is unable to verbalize pain. She is currently on Tramadol 25 mg Q 8 hours. She is on Risperdal and Buspar for behaviors. She is eating well and her bowels are moving regularly.         Assessment/Plan:    Acute left femur fracture  History of left femur # s/p cephalomedullary nailing on 4/16/2023;  - intramedullary morena is fracture, s/p hemiarthroplasty on 10/5/23 and screw removal.   -Increase tramadol to 25 mg Q 6 hours.    -Tylenol 975 mg Q 8 hours.    Acute blood loss anemia   -hgb now 8.9.   -Monitor.       History of dementia with behavioral issues;  -  sundowning with combativeness.   - Continue PTA BuSpar, Aricept, Risperdal, Zoloft  -Increase Risperdal to 0.25 mg BID.     History of hypothyroidism;  -On 8/24, TSH 9.82 (before that around 15), pharmacy reported levothyroxine dose increased.    - Recheck TSH in 2 weeks     History of chronic lower extremity edema;  - Continue PTA Lasix.    Chronic Kidney Disease   -Follow up BMP unremarkable.       Active Ambulatory Problems     Diagnosis Date Noted    Closed left hip fracture (H) 04/15/2023    Hypothyroidism 04/15/2023    Hyperlipidemia 04/15/2023    Bilateral lower extremity edema 04/15/2023    DNR (do not resuscitate) 04/16/2023    Status post-operative repair of hip fracture 04/16/2023    Closed fracture of trochanter of left femur, sequela 10/03/2023     Resolved Ambulatory Problems     Diagnosis Date Noted    Dementia (H) 04/15/2023     No Additional Past Medical History     Allergies   Allergen Reactions    Alendronate GI Disturbance     Gastrointestinal reflux    Calcitonin GI Disturbance     Gastrointestinal reflux       All Meds and Allergies reviewed in the record at the facility and is the most up-to-date.    Current Outpatient Medications   Medication Sig    acetaminophen (TYLENOL) 325 MG tablet Take 3 tablets (975 mg) by mouth every 8 hours    aspirin 81 MG EC tablet Take 1 tablet (81 mg) by mouth 2 times daily    busPIRone (BUSPAR) 7.5 MG tablet Take 7.5 mg by mouth At Bedtime    calcium carbonate-vitamin D (OSCAL) 500-5 MG-MCG tablet Take 1 tablet by mouth daily    donepezil (ARICEPT) 10 MG ODT Take 10 mg by mouth At Bedtime    donepezil (ARICEPT) 5 MG ODT Take 2.5 mg by mouth every morning    furosemide (LASIX) 40 MG tablet Take 40 mg by mouth daily    levothyroxine (SYNTHROID/LEVOTHROID) 75 MCG tablet Take 75 mcg by mouth daily    melatonin 5 MG tablet Take 5 mg by mouth At Bedtime    multivitamin, therapeutic (THERA-VIT) TABS tablet Take 1 tablet by mouth daily    nystatin (MYCOSTATIN) 285261 UNIT/GM external powder Apply topically 2 times daily Wash and dry area under breasts before applying powder    ondansetron (ZOFRAN ODT) 4 MG disintegrating tablet [ONDANSETRON (ZOFRAN ODT) 4 MG DISINTEGRATING TABLET] Take 1 tablet (4 mg total) by  mouth every 8 (eight) hours as needed for nausea.    risperiDONE (RISPERDAL) 0.25 MG tablet Take 0.25 mg by mouth 2 times daily PCP to follow and further GDR.    senna-docusate (SENOKOT-S/PERICOLACE) 8.6-50 MG tablet Take 2 tablets by mouth 2 times daily    sertraline (ZOLOFT) 50 MG tablet Take 50 mg by mouth daily    traMADol (ULTRAM) 50 MG tablet Take 1 tablet (50 mg) by mouth every 6 hours as needed for moderate pain (Patient taking differently: Take 50 mg by mouth every 6 hours)    trolamine salicylate (ASPERCREME) 10 % external cream Apply topically 2 times daily     No current facility-administered medications for this visit.     Facility-Administered Medications Ordered in Other Visits   Medication    donepezil (ARICEPT) half-tab 2.5 mg    QUEtiapine (SEROquel) quarter-tab 6.25 mg    ramelteon (ROZEREM) tablet 8 mg       REVIEW OF SYSTEMS:   10 point review of systems reviewed and pertinent positives in the HPI.     PHYSICAL EXAMINATION:  Physical Exam     Vital signs: /68   Pulse 95   Temp 98  F (36.7  C)   Resp 18   Ht 1.524 m (5')   Wt 71.5 kg (157 lb 9.6 oz)   SpO2 95%   BMI 30.78 kg/m    General: Awake, Alert, oriented x1, rambles nonsensically, does not follow commands.   HEENT:Pink conjunctiva,  moist oral mucosa  NECK: Supple  CVS:  S1  S2, without murmur or gallop.   LUNG: Clear to auscultation, No wheezes, rales or rhonci.  BACK: No kyphosis of the thoracic spine  ABDOMEN: Soft, nontender to palpation, with positive bowel sounds  EXTREMITIES: Moves both upper and lower extremities with diffuse weakness, trace pedal edema, no calf tenderness  NEUROLOGIC: Intact, pulses palpable  PSYCHIATRIC: Advanced cognitive impairment.      Labs:  All labs reviewed in the nursing home record and [facility]   @  Lab Results   Component Value Date    WBC 11.3 10/04/2023     Lab Results   Component Value Date    RBC 3.81 10/04/2023     Lab Results   Component Value Date    HGB 8.5 10/10/2023     Lab Results    Component Value Date    HCT 38.3 10/04/2023     Lab Results   Component Value Date     10/04/2023     Lab Results   Component Value Date    MCH 32.3 10/04/2023     Lab Results   Component Value Date    MCHC 32.1 10/04/2023     Lab Results   Component Value Date    RDW 12.7 10/04/2023     Lab Results   Component Value Date     10/04/2023        @Last Comprehensive Metabolic Panel:  Sodium   Date Value Ref Range Status   10/16/2023 141 135 - 145 mmol/L Final     Comment:     Reference intervals for this test were updated on 09/26/2023 to more accurately reflect our healthy population. There may be differences in the flagging of prior results with similar values performed with this method. Interpretation of those prior results can be made in the context of the updated reference intervals.      Potassium   Date Value Ref Range Status   10/16/2023 4.4 3.4 - 5.3 mmol/L Final   04/18/2023 4.2 3.5 - 5.0 mmol/L Final     Chloride   Date Value Ref Range Status   10/16/2023 105 98 - 107 mmol/L Final   04/18/2023 109 (H) 98 - 107 mmol/L Final     Carbon Dioxide (CO2)   Date Value Ref Range Status   10/16/2023 25 22 - 29 mmol/L Final   04/18/2023 23 22 - 31 mmol/L Final     Anion Gap   Date Value Ref Range Status   10/16/2023 11 7 - 15 mmol/L Final   04/18/2023 8 5 - 18 mmol/L Final     Glucose   Date Value Ref Range Status   10/16/2023 94 70 - 99 mg/dL Final   04/18/2023 94 70 - 125 mg/dL Final     GLUCOSE BY METER POCT   Date Value Ref Range Status   10/06/2023 124 (H) 70 - 99 mg/dL Final     Urea Nitrogen   Date Value Ref Range Status   10/16/2023 26.6 (H) 8.0 - 23.0 mg/dL Final   04/18/2023 27 8 - 28 mg/dL Final     Creatinine   Date Value Ref Range Status   10/16/2023 1.08 (H) 0.51 - 0.95 mg/dL Final     GFR Estimate   Date Value Ref Range Status   10/16/2023 50 (L) >60 mL/min/1.73m2 Final   07/23/2020 56 (L) >60 mL/min/1.73m2 Final     Calcium   Date Value Ref Range Status   10/16/2023 9.0 8.8 - 10.2 mg/dL  Final          At the conclusion of the encounter I discussed  the results of all of the tests and the disposition with patient.   All questions were answered.  The patient acknowledged understanding and was involved in the decision making regarding the overall care plan.        This note has been dictated using voice recognition software. Any grammatical or context distortions are unintentional and inherent to the software    Electronically signed by: Wendy Mojica CNP

## 2023-10-24 NOTE — PROGRESS NOTES
[facility]    Code Status:  DNR/DNI   Visit Type:   Chief Complaint   Patient presents with    TCU Follow Up     Facility:  [facility] ([facility]) [[id number]]         HPI: Shanta Tello is a 83 year old female who I am seeing today for follow up on the  TCU. Pt recently hospitalized on 10/3/23. Past medical history includes advanced dementia,  hypothyroidism, hyperlipidemia and chronic lower extremity edema. Pt with worsening left leg pain X 2 weeks. No fall or trauma. Hx of left femur s/p cephalomedullary nailing on 4/16/23. Imaging showed intramedullary morena fx with one of the screws in the distal femur metaphysis fractured. Pt s/p removal of intramedullary femur morena and left hip bipolar hemiarthroplasty on 10/5/23. Post operative acute blood loss anemia. Hgb trended down to 8.0. Acute urinary retention which resolved. Dementia with behavioral issues.     Transitional Care Course: Today pt sitting up in wheelchair. Pt with advanced dementia with behaviors. Nursing staff and daughter reporting increased sleepiness. Pt somewhat sleepy on exam. She continues on Risperdal and Buspar for dementia with behaviors. She is unable to verbalize pain and continue on Tramadol Q  6 hours. With any movement she becomes combative. She is progressing slowly.       Assessment/Plan:    Acute left femur fracture  History of left femur # s/p cephalomedullary nailing on 4/16/2023;  - intramedullary morena is fracture, s/p hemiarthroplasty on 10/5/23 and screw removal.   - tramadol to 25 mg Q 6 hours.    -Tylenol 975 mg Q 8 hours.    Acute blood loss anemia   -hgb now 8.9.   -Follow up CBC.      History of dementia with behavioral issues;  -  sundowning with combativeness.   - Continue PTA BuSpar, Aricept, Risperdal, Zoloft  -Decrease Risperdal to 0.25 mg Q evening.      History of hypothyroidism;  -On 8/24, TSH 9.82 (before that around 15), pharmacy reported levothyroxine dose increased.   - Recheck TSH in 2 weeks      History of chronic lower extremity edema;  - Continue PTA Lasix.    Chronic Kidney Disease   -Follow up BMP.       Active Ambulatory Problems     Diagnosis Date Noted    Closed left hip fracture (H) 04/15/2023    Hypothyroidism 04/15/2023    Hyperlipidemia 04/15/2023    Bilateral lower extremity edema 04/15/2023    DNR (do not resuscitate) 04/16/2023    Status post-operative repair of hip fracture 04/16/2023    Closed fracture of trochanter of left femur, sequela 10/03/2023     Resolved Ambulatory Problems     Diagnosis Date Noted    Dementia (H) 04/15/2023     No Additional Past Medical History     Allergies   Allergen Reactions    Alendronate GI Disturbance     Gastrointestinal reflux    Calcitonin GI Disturbance     Gastrointestinal reflux       All Meds and Allergies reviewed in the record at the facility and is the most up-to-date.    Current Outpatient Medications   Medication Sig    acetaminophen (TYLENOL) 325 MG tablet Take 3 tablets (975 mg) by mouth every 8 hours    aspirin 81 MG EC tablet Take 1 tablet (81 mg) by mouth 2 times daily    busPIRone (BUSPAR) 7.5 MG tablet Take 7.5 mg by mouth At Bedtime    calcium carbonate-vitamin D (OSCAL) 500-5 MG-MCG tablet Take 1 tablet by mouth daily    donepezil (ARICEPT) 10 MG ODT Take 10 mg by mouth At Bedtime    donepezil (ARICEPT) 5 MG ODT Take 2.5 mg by mouth every morning    furosemide (LASIX) 40 MG tablet Take 40 mg by mouth daily    levothyroxine (SYNTHROID/LEVOTHROID) 75 MCG tablet Take 75 mcg by mouth daily    melatonin 5 MG tablet Take 5 mg by mouth At Bedtime    multivitamin, therapeutic (THERA-VIT) TABS tablet Take 1 tablet by mouth daily    nystatin (MYCOSTATIN) 516751 UNIT/GM external powder Apply topically 2 times daily Wash and dry area under breasts before applying powder    ondansetron (ZOFRAN ODT) 4 MG disintegrating tablet [ONDANSETRON (ZOFRAN ODT) 4 MG DISINTEGRATING TABLET] Take 1 tablet (4 mg total) by mouth every 8 (eight) hours as needed for  nausea.    risperiDONE (RISPERDAL) 0.25 MG tablet Take 0.25 mg by mouth 2 times daily PCP to follow and further GDR.    senna-docusate (SENOKOT-S/PERICOLACE) 8.6-50 MG tablet Take 2 tablets by mouth 2 times daily    sertraline (ZOLOFT) 50 MG tablet Take 50 mg by mouth daily    traMADol (ULTRAM) 50 MG tablet Take 1 tablet (50 mg) by mouth every 6 hours as needed for moderate pain (Patient taking differently: Take 50 mg by mouth every 6 hours)    trolamine salicylate (ASPERCREME) 10 % external cream Apply topically 2 times daily     No current facility-administered medications for this visit.     Facility-Administered Medications Ordered in Other Visits   Medication    donepezil (ARICEPT) half-tab 2.5 mg    QUEtiapine (SEROquel) quarter-tab 6.25 mg    ramelteon (ROZEREM) tablet 8 mg       REVIEW OF SYSTEMS:   10 point review of systems reviewed and pertinent positives in the HPI.     PHYSICAL EXAMINATION:  Physical Exam     Vital signs: /67   Pulse 106   Temp 97.2  F (36.2  C)   Resp 20   Ht 1.524 m (5')   Wt 68.8 kg (151 lb 9.6 oz)   SpO2 92%   BMI 29.61 kg/m    General: Sleepy sitting up in wheelchair, arouses, oriented x1, rambles nonsensically, does not follow commands.   HEENT:Pink conjunctiva,  moist oral mucosa  NECK: Supple  CVS:  S1  S2, without murmur or gallop.   LUNG: Clear to auscultation, No wheezes, rales or rhonci.  BACK: No kyphosis of the thoracic spine  ABDOMEN: Soft, nontender to palpation, with positive bowel sounds  EXTREMITIES: Moves both upper and lower extremities with diffuse weakness, trace pedal edema, no calf tenderness  NEUROLOGIC: Intact, pulses palpable  PSYCHIATRIC: Advanced cognitive impairment.Swats at me with assessment.       Labs:  All labs reviewed in the nursing home record and [employer]   @  Lab Results   Component Value Date    WBC 11.3 10/04/2023     Lab Results   Component Value Date    RBC 3.81 10/04/2023     Lab Results   Component Value Date    HGB 8.5 10/10/2023      Lab Results   Component Value Date    HCT 38.3 10/04/2023     Lab Results   Component Value Date     10/04/2023     Lab Results   Component Value Date    MCH 32.3 10/04/2023     Lab Results   Component Value Date    MCHC 32.1 10/04/2023     Lab Results   Component Value Date    RDW 12.7 10/04/2023     Lab Results   Component Value Date     10/04/2023        @Last Comprehensive Metabolic Panel:  Sodium   Date Value Ref Range Status   10/16/2023 141 135 - 145 mmol/L Final     Comment:     Reference intervals for this test were updated on 09/26/2023 to more accurately reflect our healthy population. There may be differences in the flagging of prior results with similar values performed with this method. Interpretation of those prior results can be made in the context of the updated reference intervals.      Potassium   Date Value Ref Range Status   10/16/2023 4.4 3.4 - 5.3 mmol/L Final   04/18/2023 4.2 3.5 - 5.0 mmol/L Final     Chloride   Date Value Ref Range Status   10/16/2023 105 98 - 107 mmol/L Final   04/18/2023 109 (H) 98 - 107 mmol/L Final     Carbon Dioxide (CO2)   Date Value Ref Range Status   10/16/2023 25 22 - 29 mmol/L Final   04/18/2023 23 22 - 31 mmol/L Final     Anion Gap   Date Value Ref Range Status   10/16/2023 11 7 - 15 mmol/L Final   04/18/2023 8 5 - 18 mmol/L Final     Glucose   Date Value Ref Range Status   10/16/2023 94 70 - 99 mg/dL Final   04/18/2023 94 70 - 125 mg/dL Final     GLUCOSE BY METER POCT   Date Value Ref Range Status   10/06/2023 124 (H) 70 - 99 mg/dL Final     Urea Nitrogen   Date Value Ref Range Status   10/16/2023 26.6 (H) 8.0 - 23.0 mg/dL Final   04/18/2023 27 8 - 28 mg/dL Final     Creatinine   Date Value Ref Range Status   10/16/2023 1.08 (H) 0.51 - 0.95 mg/dL Final     GFR Estimate   Date Value Ref Range Status   10/16/2023 50 (L) >60 mL/min/1.73m2 Final   07/23/2020 56 (L) >60 mL/min/1.73m2 Final     Calcium   Date Value Ref Range Status   10/16/2023 9.0  8.8 - 10.2 mg/dL Final       This note has been dictated using voice recognition software. Any grammatical or context distortions are unintentional and inherent to the software    Electronically signed by: Wendy Mojica CNP

## 2023-10-24 NOTE — LETTER
10/24/2023        RE: Shanta Tello  [address]  [place] [address]        [facility]    Code Status:  DNR/DNI   Visit Type:   Chief Complaint   Patient presents with     TCU Follow Up     Facility:  [facility]) [[id number]]         HPI: Shanta Tello is a 83 year old female who I am seeing today for follow up on the  TCU. Pt recently hospitalized on 10/3/23. Past medical history includes advanced dementia,  hypothyroidism, hyperlipidemia and chronic lower extremity edema. Pt with worsening left leg pain X 2 weeks. No fall or trauma. Hx of left femur s/p cephalomedullary nailing on 4/16/23. Imaging showed intramedullary morena fx with one of the screws in the distal femur metaphysis fractured. Pt s/p removal of intramedullary femur morena and left hip bipolar hemiarthroplasty on 10/5/23. Post operative acute blood loss anemia. Hgb trended down to 8.0. Acute urinary retention which resolved. Dementia with behavioral issues.     Transitional Care Course: Today pt sitting up in wheelchair. Pt with advanced dementia with behaviors. Nursing staff and daughter reporting increased sleepiness. Pt somewhat sleepy on exam. She continues on Risperdal and Buspar for dementia with behaviors. She is unable to verbalize pain and continue on Tramadol Q  6 hours. With any movement she becomes combative. She is progressing slowly.       Assessment/Plan:    Acute left femur fracture  History of left femur # s/p cephalomedullary nailing on 4/16/2023;  - intramedullary morena is fracture, s/p hemiarthroplasty on 10/5/23 and screw removal.   - tramadol to 25 mg Q 6 hours.    -Tylenol 975 mg Q 8 hours.    Acute blood loss anemia   -hgb now 8.9.   -Follow up CBC.      History of dementia with behavioral issues;  -  sundowning with combativeness.   - Continue PTA BuSpar, Aricept, Risperdal, Zoloft  -Decrease Risperdal to 0.25 mg Q evening.      History of hypothyroidism;  -On 8/24, TSH 9.82 (before that  around 15), pharmacy reported levothyroxine dose increased.   - Recheck TSH in 2 weeks     History of chronic lower extremity edema;  - Continue PTA Lasix.    Chronic Kidney Disease   -Follow up BMP.       Active Ambulatory Problems     Diagnosis Date Noted     Closed left hip fracture (H) 04/15/2023     Hypothyroidism 04/15/2023     Hyperlipidemia 04/15/2023     Bilateral lower extremity edema 04/15/2023     DNR (do not resuscitate) 04/16/2023     Status post-operative repair of hip fracture 04/16/2023     Closed fracture of trochanter of left femur, sequela 10/03/2023     Resolved Ambulatory Problems     Diagnosis Date Noted     Dementia (H) 04/15/2023     No Additional Past Medical History     Allergies   Allergen Reactions     Alendronate GI Disturbance     Gastrointestinal reflux     Calcitonin GI Disturbance     Gastrointestinal reflux       All Meds and Allergies reviewed in the record at the facility and is the most up-to-date.    Current Outpatient Medications   Medication Sig     acetaminophen (TYLENOL) 325 MG tablet Take 3 tablets (975 mg) by mouth every 8 hours     aspirin 81 MG EC tablet Take 1 tablet (81 mg) by mouth 2 times daily     busPIRone (BUSPAR) 7.5 MG tablet Take 7.5 mg by mouth At Bedtime     calcium carbonate-vitamin D (OSCAL) 500-5 MG-MCG tablet Take 1 tablet by mouth daily     donepezil (ARICEPT) 10 MG ODT Take 10 mg by mouth At Bedtime     donepezil (ARICEPT) 5 MG ODT Take 2.5 mg by mouth every morning     furosemide (LASIX) 40 MG tablet Take 40 mg by mouth daily     levothyroxine (SYNTHROID/LEVOTHROID) 75 MCG tablet Take 75 mcg by mouth daily     melatonin 5 MG tablet Take 5 mg by mouth At Bedtime     multivitamin, therapeutic (THERA-VIT) TABS tablet Take 1 tablet by mouth daily     nystatin (MYCOSTATIN) 320220 UNIT/GM external powder Apply topically 2 times daily Wash and dry area under breasts before applying powder     ondansetron (ZOFRAN ODT) 4 MG disintegrating tablet [ONDANSETRON  (ZOFRAN ODT) 4 MG DISINTEGRATING TABLET] Take 1 tablet (4 mg total) by mouth every 8 (eight) hours as needed for nausea.     risperiDONE (RISPERDAL) 0.25 MG tablet Take 0.25 mg by mouth 2 times daily PCP to follow and further GDR.     senna-docusate (SENOKOT-S/PERICOLACE) 8.6-50 MG tablet Take 2 tablets by mouth 2 times daily     sertraline (ZOLOFT) 50 MG tablet Take 50 mg by mouth daily     traMADol (ULTRAM) 50 MG tablet Take 1 tablet (50 mg) by mouth every 6 hours as needed for moderate pain (Patient taking differently: Take 50 mg by mouth every 6 hours)     trolamine salicylate (ASPERCREME) 10 % external cream Apply topically 2 times daily     No current facility-administered medications for this visit.     Facility-Administered Medications Ordered in Other Visits   Medication     donepezil (ARICEPT) half-tab 2.5 mg     QUEtiapine (SEROquel) quarter-tab 6.25 mg     ramelteon (ROZEREM) tablet 8 mg       REVIEW OF SYSTEMS:   10 point review of systems reviewed and pertinent positives in the HPI.     PHYSICAL EXAMINATION:  Physical Exam     Vital signs: /67   Pulse 106   Temp 97.2  F (36.2  C)   Resp 20   Ht 1.524 m (5')   Wt 68.8 kg (151 lb 9.6 oz)   SpO2 92%   BMI 29.61 kg/m    General: Sleepy sitting up in wheelchair, arouses, oriented x1, rambles nonsensically, does not follow commands.   HEENT:Pink conjunctiva,  moist oral mucosa  NECK: Supple  CVS:  S1  S2, without murmur or gallop.   LUNG: Clear to auscultation, No wheezes, rales or rhonci.  BACK: No kyphosis of the thoracic spine  ABDOMEN: Soft, nontender to palpation, with positive bowel sounds  EXTREMITIES: Moves both upper and lower extremities with diffuse weakness, trace pedal edema, no calf tenderness  NEUROLOGIC: Intact, pulses palpable  PSYCHIATRIC: Advanced cognitive impairment.Swats at me with assessment.       Labs:  All labs reviewed in the nursing home record and [employer]   @  Lab Results   Component Value Date    WBC 11.3 10/04/2023      Lab Results   Component Value Date    RBC 3.81 10/04/2023     Lab Results   Component Value Date    HGB 8.5 10/10/2023     Lab Results   Component Value Date    HCT 38.3 10/04/2023     Lab Results   Component Value Date     10/04/2023     Lab Results   Component Value Date    MCH 32.3 10/04/2023     Lab Results   Component Value Date    MCHC 32.1 10/04/2023     Lab Results   Component Value Date    RDW 12.7 10/04/2023     Lab Results   Component Value Date     10/04/2023        @Last Comprehensive Metabolic Panel:  Sodium   Date Value Ref Range Status   10/16/2023 141 135 - 145 mmol/L Final     Comment:     Reference intervals for this test were updated on 09/26/2023 to more accurately reflect our healthy population. There may be differences in the flagging of prior results with similar values performed with this method. Interpretation of those prior results can be made in the context of the updated reference intervals.      Potassium   Date Value Ref Range Status   10/16/2023 4.4 3.4 - 5.3 mmol/L Final   04/18/2023 4.2 3.5 - 5.0 mmol/L Final     Chloride   Date Value Ref Range Status   10/16/2023 105 98 - 107 mmol/L Final   04/18/2023 109 (H) 98 - 107 mmol/L Final     Carbon Dioxide (CO2)   Date Value Ref Range Status   10/16/2023 25 22 - 29 mmol/L Final   04/18/2023 23 22 - 31 mmol/L Final     Anion Gap   Date Value Ref Range Status   10/16/2023 11 7 - 15 mmol/L Final   04/18/2023 8 5 - 18 mmol/L Final     Glucose   Date Value Ref Range Status   10/16/2023 94 70 - 99 mg/dL Final   04/18/2023 94 70 - 125 mg/dL Final     GLUCOSE BY METER POCT   Date Value Ref Range Status   10/06/2023 124 (H) 70 - 99 mg/dL Final     Urea Nitrogen   Date Value Ref Range Status   10/16/2023 26.6 (H) 8.0 - 23.0 mg/dL Final   04/18/2023 27 8 - 28 mg/dL Final     Creatinine   Date Value Ref Range Status   10/16/2023 1.08 (H) 0.51 - 0.95 mg/dL Final     GFR Estimate   Date Value Ref Range Status   10/16/2023 50 (L) >60  mL/min/1.73m2 Final   07/23/2020 56 (L) >60 mL/min/1.73m2 Final     Calcium   Date Value Ref Range Status   10/16/2023 9.0 8.8 - 10.2 mg/dL Final       This note has been dictated using voice recognition software. Any grammatical or context distortions are unintentional and inherent to the software    Electronically signed by: Wendy Mojica CNP       Sincerely,        Wendy Mojica, NP

## 2023-10-26 NOTE — LETTER
10/26/2023        RE: Shanta Tello  [id number] [facility] [place] [address]        [facility]    Code Status:  DNR/DNI   Visit Type:   Chief Complaint   Patient presents with     TCU Follow Up     Facility:  [facility]) [[id number]]         HPI: Shanta Tello is a 83 year old female who I am seeing today for follow up on the TCU. Pt recently hospitalized on 10/3/23. Past medical history includes advanced dementia,  hypothyroidism, hyperlipidemia and chronic lower extremity edema. Pt with worsening left leg pain X 2 weeks. No fall or trauma. Hx of left femur s/p cephalomedullary nailing on 4/16/23. Imaging showed intramedullary morena fx with one of the screws in the distal femur metaphysis fractured. Pt s/p removal of intramedullary femur morena and left hip bipolar hemiarthroplasty on 10/5/23. Post operative acute blood loss anemia. Hgb trended down to 8.0. Acute urinary retention which resolved. Dementia with behavioral issues.     Transitional Care Course: Today pt sitting up in wheelchair. Pt with advanced dementia with behaviors. Pt continues to swat at me on assessment. Pt Risperdal decreased 2 days prior to once daily due to increased sleepiness. More awake with changes. She continues on Tramadol for pain. Progress is slow due to cognitive impairment.       Assessment/Plan:    Acute left femur fracture  History of left femur # s/p cephalomedullary nailing on 4/16/2023;  - intramedullary morena is fracture, s/p hemiarthroplasty on 10/5/23 and screw removal.   - tramadol 25 mg Q 6 hours.    -Tylenol 975 mg Q 8 hours.    Acute blood loss anemia   -hgb now 9.8.     History of dementia with behavioral issues;  -  sundowning with combativeness.   - Continue PTA BuSpar, Aricept, Risperdal, Zoloft  -Risperdal to 0.25 mg Q evening.      History of hypothyroidism;  -On 8/24, TSH 9.82 (before that around 15), pharmacy reported levothyroxine dose increased.   - Recheck TSH in 1 week      History of chronic lower extremity edema;  - Continue PTA Lasix.    Chronic Kidney Disease   -Follow up BMP unremarkable.       Active Ambulatory Problems     Diagnosis Date Noted     Closed left hip fracture (H) 04/15/2023     Hypothyroidism 04/15/2023     Hyperlipidemia 04/15/2023     Bilateral lower extremity edema 04/15/2023     DNR (do not resuscitate) 04/16/2023     Status post-operative repair of hip fracture 04/16/2023     Closed fracture of trochanter of left femur, sequela 10/03/2023     Resolved Ambulatory Problems     Diagnosis Date Noted     Dementia (H) 04/15/2023     No Additional Past Medical History     Allergies   Allergen Reactions     Alendronate GI Disturbance     Gastrointestinal reflux     Calcitonin GI Disturbance     Gastrointestinal reflux       All Meds and Allergies reviewed in the record at the facility and is the most up-to-date.    Current Outpatient Medications   Medication Sig     acetaminophen (TYLENOL) 325 MG tablet Take 3 tablets (975 mg) by mouth every 8 hours     aspirin 81 MG EC tablet Take 1 tablet (81 mg) by mouth 2 times daily     busPIRone (BUSPAR) 7.5 MG tablet Take 7.5 mg by mouth At Bedtime     calcium carbonate-vitamin D (OSCAL) 500-5 MG-MCG tablet Take 1 tablet by mouth daily     donepezil (ARICEPT) 10 MG ODT Take 10 mg by mouth At Bedtime     furosemide (LASIX) 40 MG tablet Take 40 mg by mouth daily     levothyroxine (SYNTHROID/LEVOTHROID) 75 MCG tablet Take 75 mcg by mouth daily     melatonin 5 MG tablet Take 5 mg by mouth At Bedtime     multivitamin, therapeutic (THERA-VIT) TABS tablet Take 1 tablet by mouth daily     nystatin (MYCOSTATIN) 091669 UNIT/GM external powder Apply topically 2 times daily Wash and dry area under breasts before applying powder     ondansetron (ZOFRAN ODT) 4 MG disintegrating tablet [ONDANSETRON (ZOFRAN ODT) 4 MG DISINTEGRATING TABLET] Take 1 tablet (4 mg total) by mouth every 8 (eight) hours as needed for nausea.     risperiDONE  (RISPERDAL) 0.25 MG tablet Take 0.25 mg by mouth daily PCP to follow and further GDR.     senna-docusate (SENOKOT-S/PERICOLACE) 8.6-50 MG tablet Take 2 tablets by mouth 2 times daily     sertraline (ZOLOFT) 50 MG tablet Take 50 mg by mouth daily     traMADol (ULTRAM) 50 MG tablet Take 0.5 tablets (25 mg) by mouth every 6 hours     trolamine salicylate (ASPERCREME) 10 % external cream Apply topically 2 times daily     No current facility-administered medications for this visit.     Facility-Administered Medications Ordered in Other Visits   Medication     donepezil (ARICEPT) half-tab 2.5 mg     QUEtiapine (SEROquel) quarter-tab 6.25 mg     ramelteon (ROZEREM) tablet 8 mg       REVIEW OF SYSTEMS:   10 point review of systems reviewed and pertinent positives in the HPI.     PHYSICAL EXAMINATION:  Physical Exam     Vital signs: /69   Pulse 96   Temp 98.3  F (36.8  C)   Resp 18   Ht 1.524 m (5')   Wt 68.8 kg (151 lb 9.6 oz)   SpO2 93%   BMI 29.61 kg/m    General: Awake, sitting up in wheelchair, arouses, oriented x1, rambles nonsensically, does not follow commands.   HEENT:Pink conjunctiva,  moist oral mucosa  NECK: Supple  CVS:  S1  S2, without murmur or gallop.   LUNG: Clear to auscultation, No wheezes, rales or rhonci.  BACK: No kyphosis of the thoracic spine  ABDOMEN: Soft, nontender to palpation, with positive bowel sounds  EXTREMITIES: Moves both upper and lower extremities with diffuse weakness, trace pedal edema, no calf tenderness  NEUROLOGIC: Intact, pulses palpable  PSYCHIATRIC: Advanced cognitive impairment.Swats at me with assessment.       Labs:  All labs reviewed in the nursing home record and [facility]   @  Lab Results   Component Value Date    WBC 11.3 10/04/2023     Lab Results   Component Value Date    RBC 3.81 10/04/2023     Lab Results   Component Value Date    HGB 8.5 10/10/2023     Lab Results   Component Value Date    HCT 38.3 10/04/2023     Lab Results   Component Value Date      10/04/2023     Lab Results   Component Value Date    MCH 32.3 10/04/2023     Lab Results   Component Value Date    MCHC 32.1 10/04/2023     Lab Results   Component Value Date    RDW 12.7 10/04/2023     Lab Results   Component Value Date     10/04/2023        @Last Comprehensive Metabolic Panel:  Sodium   Date Value Ref Range Status   10/26/2023 145 135 - 145 mmol/L Final     Comment:     Reference intervals for this test were updated on 09/26/2023 to more accurately reflect our healthy population. There may be differences in the flagging of prior results with similar values performed with this method. Interpretation of those prior results can be made in the context of the updated reference intervals.      Potassium   Date Value Ref Range Status   10/26/2023 4.1 3.4 - 5.3 mmol/L Final   04/18/2023 4.2 3.5 - 5.0 mmol/L Final     Chloride   Date Value Ref Range Status   10/26/2023 110 (H) 98 - 107 mmol/L Final   04/18/2023 109 (H) 98 - 107 mmol/L Final     Carbon Dioxide (CO2)   Date Value Ref Range Status   10/26/2023 25 22 - 29 mmol/L Final   04/18/2023 23 22 - 31 mmol/L Final     Anion Gap   Date Value Ref Range Status   10/26/2023 10 7 - 15 mmol/L Final   04/18/2023 8 5 - 18 mmol/L Final     Glucose   Date Value Ref Range Status   10/26/2023 99 70 - 99 mg/dL Final   04/18/2023 94 70 - 125 mg/dL Final     GLUCOSE BY METER POCT   Date Value Ref Range Status   10/06/2023 124 (H) 70 - 99 mg/dL Final     Urea Nitrogen   Date Value Ref Range Status   10/26/2023 34.2 (H) 8.0 - 23.0 mg/dL Final   04/18/2023 27 8 - 28 mg/dL Final     Creatinine   Date Value Ref Range Status   10/26/2023 1.17 (H) 0.51 - 0.95 mg/dL Final     GFR Estimate   Date Value Ref Range Status   10/26/2023 46 (L) >60 mL/min/1.73m2 Final   07/23/2020 56 (L) >60 mL/min/1.73m2 Final     Calcium   Date Value Ref Range Status   10/26/2023 9.2 8.8 - 10.2 mg/dL Final       This note has been dictated using voice recognition software. Any grammatical or  context distortions are unintentional and inherent to the software    Electronically signed by: Wendy Mojica CNP       Sincerely,        Wendy Mojica, NP

## 2023-10-27 NOTE — PROGRESS NOTES
[facility]    Code Status:  DNR/DNI   Visit Type:   Chief Complaint   Patient presents with    TCU Follow Up     Facility:  [facility] ([facility]) [[id number]]         HPI: Shanta Tello is a 83 year old female who I am seeing today for follow up on the TCU. Pt recently hospitalized on 10/3/23. Past medical history includes advanced dementia,  hypothyroidism, hyperlipidemia and chronic lower extremity edema. Pt with worsening left leg pain X 2 weeks. No fall or trauma. Hx of left femur s/p cephalomedullary nailing on 4/16/23. Imaging showed intramedullary morena fx with one of the screws in the distal femur metaphysis fractured. Pt s/p removal of intramedullary femur morena and left hip bipolar hemiarthroplasty on 10/5/23. Post operative acute blood loss anemia. Hgb trended down to 8.0. Acute urinary retention which resolved. Dementia with behavioral issues.     Transitional Care Course: Today pt sitting up in wheelchair. Pt with advanced dementia with behaviors. Pt continues to swat at me on assessment. Pt Risperdal decreased 2 days prior to once daily due to increased sleepiness. More awake with changes. She continues on Tramadol for pain. Progress is slow due to cognitive impairment.       Assessment/Plan:    Acute left femur fracture  History of left femur # s/p cephalomedullary nailing on 4/16/2023;  - intramedullary morena is fracture, s/p hemiarthroplasty on 10/5/23 and screw removal.   - tramadol 25 mg Q 6 hours.    -Tylenol 975 mg Q 8 hours.    Acute blood loss anemia   -hgb now 9.8.     History of dementia with behavioral issues;  -  sundowning with combativeness.   - Continue PTA BuSpar, Aricept, Risperdal, Zoloft  -Risperdal to 0.25 mg Q evening.      History of hypothyroidism;  -On 8/24, TSH 9.82 (before that around 15), pharmacy reported levothyroxine dose increased.   - Recheck TSH in 1 week     History of chronic lower extremity edema;  - Continue PTA Lasix.    Chronic Kidney Disease    -Follow up BMP unremarkable.       Active Ambulatory Problems     Diagnosis Date Noted    Closed left hip fracture (H) 04/15/2023    Hypothyroidism 04/15/2023    Hyperlipidemia 04/15/2023    Bilateral lower extremity edema 04/15/2023    DNR (do not resuscitate) 04/16/2023    Status post-operative repair of hip fracture 04/16/2023    Closed fracture of trochanter of left femur, sequela 10/03/2023     Resolved Ambulatory Problems     Diagnosis Date Noted    Dementia (H) 04/15/2023     No Additional Past Medical History     Allergies   Allergen Reactions    Alendronate GI Disturbance     Gastrointestinal reflux    Calcitonin GI Disturbance     Gastrointestinal reflux       All Meds and Allergies reviewed in the record at the facility and is the most up-to-date.    Current Outpatient Medications   Medication Sig    acetaminophen (TYLENOL) 325 MG tablet Take 3 tablets (975 mg) by mouth every 8 hours    aspirin 81 MG EC tablet Take 1 tablet (81 mg) by mouth 2 times daily    busPIRone (BUSPAR) 7.5 MG tablet Take 7.5 mg by mouth At Bedtime    calcium carbonate-vitamin D (OSCAL) 500-5 MG-MCG tablet Take 1 tablet by mouth daily    donepezil (ARICEPT) 10 MG ODT Take 10 mg by mouth At Bedtime    furosemide (LASIX) 40 MG tablet Take 40 mg by mouth daily    levothyroxine (SYNTHROID/LEVOTHROID) 75 MCG tablet Take 75 mcg by mouth daily    melatonin 5 MG tablet Take 5 mg by mouth At Bedtime    multivitamin, therapeutic (THERA-VIT) TABS tablet Take 1 tablet by mouth daily    nystatin (MYCOSTATIN) 601960 UNIT/GM external powder Apply topically 2 times daily Wash and dry area under breasts before applying powder    ondansetron (ZOFRAN ODT) 4 MG disintegrating tablet [ONDANSETRON (ZOFRAN ODT) 4 MG DISINTEGRATING TABLET] Take 1 tablet (4 mg total) by mouth every 8 (eight) hours as needed for nausea.    risperiDONE (RISPERDAL) 0.25 MG tablet Take 0.25 mg by mouth daily PCP to follow and further GDR.    senna-docusate  (SENOKOT-S/PERICOLACE) 8.6-50 MG tablet Take 2 tablets by mouth 2 times daily    sertraline (ZOLOFT) 50 MG tablet Take 50 mg by mouth daily    traMADol (ULTRAM) 50 MG tablet Take 0.5 tablets (25 mg) by mouth every 6 hours    trolamine salicylate (ASPERCREME) 10 % external cream Apply topically 2 times daily     No current facility-administered medications for this visit.     Facility-Administered Medications Ordered in Other Visits   Medication    donepezil (ARICEPT) half-tab 2.5 mg    QUEtiapine (SEROquel) quarter-tab 6.25 mg    ramelteon (ROZEREM) tablet 8 mg       REVIEW OF SYSTEMS:   10 point review of systems reviewed and pertinent positives in the HPI.     PHYSICAL EXAMINATION:  Physical Exam     Vital signs: /69   Pulse 96   Temp 98.3  F (36.8  C)   Resp 18   Ht 1.524 m (5')   Wt 68.8 kg (151 lb 9.6 oz)   SpO2 93%   BMI 29.61 kg/m    General: Awake, sitting up in wheelchair, arouses, oriented x1, rambles nonsensically, does not follow commands.   HEENT:Pink conjunctiva,  moist oral mucosa  NECK: Supple  CVS:  S1  S2, without murmur or gallop.   LUNG: Clear to auscultation, No wheezes, rales or rhonci.  BACK: No kyphosis of the thoracic spine  ABDOMEN: Soft, nontender to palpation, with positive bowel sounds  EXTREMITIES: Moves both upper and lower extremities with diffuse weakness, trace pedal edema, no calf tenderness  NEUROLOGIC: Intact, pulses palpable  PSYCHIATRIC: Advanced cognitive impairment.Swats at me with assessment.       Labs:  All labs reviewed in the nursing home record and Epic   @  Lab Results   Component Value Date    WBC 11.3 10/04/2023     Lab Results   Component Value Date    RBC 3.81 10/04/2023     Lab Results   Component Value Date    HGB 8.5 10/10/2023     Lab Results   Component Value Date    HCT 38.3 10/04/2023     Lab Results   Component Value Date     10/04/2023     Lab Results   Component Value Date    MCH 32.3 10/04/2023     Lab Results   Component Value Date     MCHC 32.1 10/04/2023     Lab Results   Component Value Date    RDW 12.7 10/04/2023     Lab Results   Component Value Date     10/04/2023        @Last Comprehensive Metabolic Panel:  Sodium   Date Value Ref Range Status   10/26/2023 145 135 - 145 mmol/L Final     Comment:     Reference intervals for this test were updated on 09/26/2023 to more accurately reflect our healthy population. There may be differences in the flagging of prior results with similar values performed with this method. Interpretation of those prior results can be made in the context of the updated reference intervals.      Potassium   Date Value Ref Range Status   10/26/2023 4.1 3.4 - 5.3 mmol/L Final   04/18/2023 4.2 3.5 - 5.0 mmol/L Final     Chloride   Date Value Ref Range Status   10/26/2023 110 (H) 98 - 107 mmol/L Final   04/18/2023 109 (H) 98 - 107 mmol/L Final     Carbon Dioxide (CO2)   Date Value Ref Range Status   10/26/2023 25 22 - 29 mmol/L Final   04/18/2023 23 22 - 31 mmol/L Final     Anion Gap   Date Value Ref Range Status   10/26/2023 10 7 - 15 mmol/L Final   04/18/2023 8 5 - 18 mmol/L Final     Glucose   Date Value Ref Range Status   10/26/2023 99 70 - 99 mg/dL Final   04/18/2023 94 70 - 125 mg/dL Final     GLUCOSE BY METER POCT   Date Value Ref Range Status   10/06/2023 124 (H) 70 - 99 mg/dL Final     Urea Nitrogen   Date Value Ref Range Status   10/26/2023 34.2 (H) 8.0 - 23.0 mg/dL Final   04/18/2023 27 8 - 28 mg/dL Final     Creatinine   Date Value Ref Range Status   10/26/2023 1.17 (H) 0.51 - 0.95 mg/dL Final     GFR Estimate   Date Value Ref Range Status   10/26/2023 46 (L) >60 mL/min/1.73m2 Final   07/23/2020 56 (L) >60 mL/min/1.73m2 Final     Calcium   Date Value Ref Range Status   10/26/2023 9.2 8.8 - 10.2 mg/dL Final       This note has been dictated using voice recognition software. Any grammatical or context distortions are unintentional and inherent to the software    Electronically signed by: Wendy Mojica,  CNP

## 2023-10-30 NOTE — LETTER
10/30/2023        RE: Shanta Tello  [address] [facility] [facility]  [place] [address]        [facility]    Code Status:  DNR/DNI   Visit Type:   Chief Complaint   Patient presents with     TCU Follow Up     Facility:  [facility]) [[id number]]         HPI: Shanta Tello is a 83 year old female who I am seeing today for follow up on the TCU. Pt recently hospitalized on 10/3/23. Past medical history includes advanced dementia,  hypothyroidism, hyperlipidemia and chronic lower extremity edema. Pt with worsening left leg pain X 2 weeks. No fall or trauma. Hx of left femur s/p cephalomedullary nailing on 4/16/23. Imaging showed intramedullary morena fx with one of the screws in the distal femur metaphysis fractured. Pt s/p removal of intramedullary femur morena and left hip bipolar hemiarthroplasty on 10/5/23. Post operative acute blood loss anemia. Hgb trended down to 8.0. Acute urinary retention which resolved. Dementia with behavioral issues.     Transitional Care Course: Today pt sitting up in wheelchair. Pt with advanced dementia with behaviors. Nursing staff reporting concerns of pt not eating of the weekend. She has had a 10 pt weight loss. She is alert sitting up in chair today. I did ask nursing staff to obtain urinalysis prior to my arrival. Nursing staff reporting urine very cloudy. Pt continues on Tramadol for post op pain. She is able to walk with assist 100 ft with rolling walker however very inconsistent. Risperdal decreased  to once daily due to increased sleepiness. More awake with changes.     I did speak with her daughter on the phone in regard to overall prognosis and work up. Rule out infection as cause for change. Pt is suppose to discharge in the am back to her memory care [facility]. Also discussed possible hospice today pending work up.     Assessment/Plan:    Failure to thrive  Weight loss   -most likely multifactorial.   -Rule out infection. Check UA/UC, CBC, BMP.   -~10 lb weight  loss since admit.   -Advanced dementia with behaviors.     Acute left femur fracture  History of left femur # s/p cephalomedullary nailing on 4/16/2023;  - intramedullary morena is fracture, s/p hemiarthroplasty on 10/5/23 and screw removal.   - tramadol 25 mg Q 6 hours.    -Tylenol 975 mg Q 8 hours.    Acute cystisis   -UA returned with moderate mucus, WBC, leukocytes and bacteria.   -Give loading dose of Levaquin today.   -monitor for signs and symptoms of infection.   -Encourage fluids.     Acute blood loss anemia   -hgb now 9.8.     History of dementia with behavioral issues;  -  sundowning with combativeness.   - Continue PTA BuSpar, Aricept, Risperdal, Zoloft  -Risperdal to 0.25 mg Q evening.      History of hypothyroidism;  -On 8/24, TSH 9.82 (before that around 15), pharmacy reported levothyroxine dose increased.   - Recheck TSH today.      History of chronic lower extremity edema;  - Discontinue lasix.     Chronic Kidney Disease   -Follow up BMP.       Active Ambulatory Problems     Diagnosis Date Noted     Closed left hip fracture (H) 04/15/2023     Hypothyroidism 04/15/2023     Hyperlipidemia 04/15/2023     Bilateral lower extremity edema 04/15/2023     DNR (do not resuscitate) 04/16/2023     Status post-operative repair of hip fracture 04/16/2023     Closed fracture of trochanter of left femur, sequela 10/03/2023     Resolved Ambulatory Problems     Diagnosis Date Noted     Dementia (H) 04/15/2023     No Additional Past Medical History     Allergies   Allergen Reactions     Alendronate GI Disturbance     Gastrointestinal reflux     Calcitonin GI Disturbance     Gastrointestinal reflux       All Meds and Allergies reviewed in the record at the facility and is the most up-to-date.    Current Outpatient Medications   Medication Sig     levofloxacin (LEVAQUIN) 500 MG tablet Take 500 mg by mouth daily     acetaminophen (TYLENOL) 325 MG tablet Take 3 tablets (975 mg) by mouth every 8 hours     aspirin 81 MG EC  tablet Take 1 tablet (81 mg) by mouth 2 times daily     busPIRone (BUSPAR) 7.5 MG tablet Take 7.5 mg by mouth At Bedtime     calcium carbonate-vitamin D (OSCAL) 500-5 MG-MCG tablet Take 1 tablet by mouth daily     donepezil (ARICEPT) 10 MG ODT Take 10 mg by mouth At Bedtime     furosemide (LASIX) 40 MG tablet Take 40 mg by mouth daily     levothyroxine (SYNTHROID/LEVOTHROID) 75 MCG tablet Take 75 mcg by mouth daily     melatonin 5 MG tablet Take 5 mg by mouth At Bedtime     multivitamin, therapeutic (THERA-VIT) TABS tablet Take 1 tablet by mouth daily     nystatin (MYCOSTATIN) 365996 UNIT/GM external powder Apply topically 2 times daily Wash and dry area under breasts before applying powder     ondansetron (ZOFRAN ODT) 4 MG disintegrating tablet [ONDANSETRON (ZOFRAN ODT) 4 MG DISINTEGRATING TABLET] Take 1 tablet (4 mg total) by mouth every 8 (eight) hours as needed for nausea.     risperiDONE (RISPERDAL) 0.25 MG tablet Take 0.25 mg by mouth daily PCP to follow and further GDR.     senna-docusate (SENOKOT-S/PERICOLACE) 8.6-50 MG tablet Take 2 tablets by mouth 2 times daily     sertraline (ZOLOFT) 50 MG tablet Take 50 mg by mouth daily     traMADol (ULTRAM) 50 MG tablet Take 0.5 tablets (25 mg) by mouth every 6 hours     trolamine salicylate (ASPERCREME) 10 % external cream Apply topically 2 times daily     No current facility-administered medications for this visit.     Facility-Administered Medications Ordered in Other Visits   Medication     donepezil (ARICEPT) half-tab 2.5 mg     QUEtiapine (SEROquel) quarter-tab 6.25 mg     ramelteon (ROZEREM) tablet 8 mg       REVIEW OF SYSTEMS:   10 point review of systems reviewed and pertinent positives in the HPI.     PHYSICAL EXAMINATION:  Physical Exam     Vital signs: /65   Pulse 88   Temp 98  F (36.7  C)   Resp 17   Ht 1.524 m (5')   Wt 67.7 kg (149 lb 3.2 oz)   SpO2 98%   BMI 29.14 kg/m    General: Awake, sitting up in wheelchair,oriented x1, rambles  nonsensically, does not follow commands.   HEENT:Pink conjunctiva,  moist oral mucosa  NECK: Supple  CVS:  S1  S2, without murmur or gallop.   LUNG: Clear to auscultation, No wheezes, rales or rhonci.  BACK: No kyphosis of the thoracic spine  ABDOMEN: Soft, nontender to palpation, with positive bowel sounds  EXTREMITIES: Moves both upper and lower extremities with diffuse weakness, trace pedal edema, no calf tenderness  NEUROLOGIC: Intact, pulses palpable  PSYCHIATRIC: Advanced cognitive impairment.      Labs:  All labs reviewed in the nursing home record and Epic   @  Lab Results   Component Value Date    WBC 11.3 10/04/2023     Lab Results   Component Value Date    RBC 3.81 10/04/2023     Lab Results   Component Value Date    HGB 8.5 10/10/2023     Lab Results   Component Value Date    HCT 38.3 10/04/2023     Lab Results   Component Value Date     10/04/2023     Lab Results   Component Value Date    MCH 32.3 10/04/2023     Lab Results   Component Value Date    MCHC 32.1 10/04/2023     Lab Results   Component Value Date    RDW 12.7 10/04/2023     Lab Results   Component Value Date     10/04/2023        @Last Comprehensive Metabolic Panel:  Sodium   Date Value Ref Range Status   10/30/2023 147 (H) 135 - 145 mmol/L Final     Comment:     Reference intervals for this test were updated on 09/26/2023 to more accurately reflect our healthy population. There may be differences in the flagging of prior results with similar values performed with this method. Interpretation of those prior results can be made in the context of the updated reference intervals.      Potassium   Date Value Ref Range Status   10/30/2023 4.5 3.4 - 5.3 mmol/L Final   04/18/2023 4.2 3.5 - 5.0 mmol/L Final     Chloride   Date Value Ref Range Status   10/30/2023 108 (H) 98 - 107 mmol/L Final   04/18/2023 109 (H) 98 - 107 mmol/L Final     Carbon Dioxide (CO2)   Date Value Ref Range Status   10/30/2023 23 22 - 29 mmol/L Final   04/18/2023  23 22 - 31 mmol/L Final     Anion Gap   Date Value Ref Range Status   10/30/2023 16 (H) 7 - 15 mmol/L Final   04/18/2023 8 5 - 18 mmol/L Final     Glucose   Date Value Ref Range Status   10/30/2023 94 70 - 99 mg/dL Final   04/18/2023 94 70 - 125 mg/dL Final     GLUCOSE BY METER POCT   Date Value Ref Range Status   10/06/2023 124 (H) 70 - 99 mg/dL Final     Urea Nitrogen   Date Value Ref Range Status   10/30/2023 30.3 (H) 8.0 - 23.0 mg/dL Final   04/18/2023 27 8 - 28 mg/dL Final     Creatinine   Date Value Ref Range Status   10/30/2023 1.00 (H) 0.51 - 0.95 mg/dL Final     GFR Estimate   Date Value Ref Range Status   10/30/2023 55 (L) >60 mL/min/1.73m2 Final   07/23/2020 56 (L) >60 mL/min/1.73m2 Final     Calcium   Date Value Ref Range Status   10/30/2023 9.1 8.8 - 10.2 mg/dL Final       This note has been dictated using voice recognition software. Any grammatical or context distortions are unintentional and inherent to the software    Electronically signed by: Wendy Mojica CNP       Sincerely,        Wendy Mojica, NP

## 2023-10-31 NOTE — PROGRESS NOTES
[facility]    Code Status:  DNR/DNI   Visit Type:   Chief Complaint   Patient presents with    TCU Follow Up     Facility:  [facility] ([facility]) [[id number]]         HPI: Shanta Tello is a 83 year old female who I am seeing today for follow up on the TCU. Pt recently hospitalized on 10/3/23. Past medical history includes advanced dementia,  hypothyroidism, hyperlipidemia and chronic lower extremity edema. Pt with worsening left leg pain X 2 weeks. No fall or trauma. Hx of left femur s/p cephalomedullary nailing on 4/16/23. Imaging showed intramedullary morena fx with one of the screws in the distal femur metaphysis fractured. Pt s/p removal of intramedullary femur morena and left hip bipolar hemiarthroplasty on 10/5/23. Post operative acute blood loss anemia. Hgb trended down to 8.0. Acute urinary retention which resolved. Dementia with behavioral issues.     Transitional Care Course: Today pt sitting up in wheelchair. Pt with advanced dementia with behaviors. Nursing staff reporting concerns of pt not eating of the weekend. She has had a 10 pt weight loss. She is alert sitting up in chair today. I did ask nursing staff to obtain urinalysis prior to my arrival. Nursing staff reporting urine very cloudy. Pt continues on Tramadol for post op pain. She is able to walk with assist 100 ft with rolling walker however very inconsistent. Risperdal decreased  to once daily due to increased sleepiness. More awake with changes.     I did speak with her daughter on the phone in regard to overall prognosis and work up. Rule out infection as cause for change. Pt is suppose to discharge in the am back to her memory care [facility]. Also discussed possible hospice today pending work up.     Assessment/Plan:    Failure to thrive  Weight loss   -most likely multifactorial.   -Rule out infection. Check UA/UC, CBC, BMP.   -~10 lb weight loss since admit.   -Advanced dementia with behaviors.     Acute left femur  fracture  History of left femur # s/p cephalomedullary nailing on 4/16/2023;  - intramedullary morena is fracture, s/p hemiarthroplasty on 10/5/23 and screw removal.   - tramadol 25 mg Q 6 hours.    -Tylenol 975 mg Q 8 hours.    Acute cystisis   -UA returned with moderate mucus, WBC, leukocytes and bacteria.   -Give loading dose of Levaquin today.   -monitor for signs and symptoms of infection.   -Encourage fluids.     Acute blood loss anemia   -hgb now 9.8.     History of dementia with behavioral issues;  -  sundowning with combativeness.   - Continue PTA BuSpar, Aricept, Risperdal, Zoloft  -Risperdal to 0.25 mg Q evening.      History of hypothyroidism;  -On 8/24, TSH 9.82 (before that around 15), pharmacy reported levothyroxine dose increased.   - Recheck TSH today.      History of chronic lower extremity edema;  - Discontinue lasix.     Chronic Kidney Disease   -Follow up BMP.       Active Ambulatory Problems     Diagnosis Date Noted    Closed left hip fracture (H) 04/15/2023    Hypothyroidism 04/15/2023    Hyperlipidemia 04/15/2023    Bilateral lower extremity edema 04/15/2023    DNR (do not resuscitate) 04/16/2023    Status post-operative repair of hip fracture 04/16/2023    Closed fracture of trochanter of left femur, sequela 10/03/2023     Resolved Ambulatory Problems     Diagnosis Date Noted    Dementia (H) 04/15/2023     No Additional Past Medical History     Allergies   Allergen Reactions    Alendronate GI Disturbance     Gastrointestinal reflux    Calcitonin GI Disturbance     Gastrointestinal reflux       All Meds and Allergies reviewed in the record at the facility and is the most up-to-date.    Current Outpatient Medications   Medication Sig    levofloxacin (LEVAQUIN) 500 MG tablet Take 500 mg by mouth daily    acetaminophen (TYLENOL) 325 MG tablet Take 3 tablets (975 mg) by mouth every 8 hours    aspirin 81 MG EC tablet Take 1 tablet (81 mg) by mouth 2 times daily    busPIRone (BUSPAR) 7.5 MG tablet  Take 7.5 mg by mouth At Bedtime    calcium carbonate-vitamin D (OSCAL) 500-5 MG-MCG tablet Take 1 tablet by mouth daily    donepezil (ARICEPT) 10 MG ODT Take 10 mg by mouth At Bedtime    furosemide (LASIX) 40 MG tablet Take 40 mg by mouth daily    levothyroxine (SYNTHROID/LEVOTHROID) 75 MCG tablet Take 75 mcg by mouth daily    melatonin 5 MG tablet Take 5 mg by mouth At Bedtime    multivitamin, therapeutic (THERA-VIT) TABS tablet Take 1 tablet by mouth daily    nystatin (MYCOSTATIN) 753817 UNIT/GM external powder Apply topically 2 times daily Wash and dry area under breasts before applying powder    ondansetron (ZOFRAN ODT) 4 MG disintegrating tablet [ONDANSETRON (ZOFRAN ODT) 4 MG DISINTEGRATING TABLET] Take 1 tablet (4 mg total) by mouth every 8 (eight) hours as needed for nausea.    risperiDONE (RISPERDAL) 0.25 MG tablet Take 0.25 mg by mouth daily PCP to follow and further GDR.    senna-docusate (SENOKOT-S/PERICOLACE) 8.6-50 MG tablet Take 2 tablets by mouth 2 times daily    sertraline (ZOLOFT) 50 MG tablet Take 50 mg by mouth daily    traMADol (ULTRAM) 50 MG tablet Take 0.5 tablets (25 mg) by mouth every 6 hours    trolamine salicylate (ASPERCREME) 10 % external cream Apply topically 2 times daily     No current facility-administered medications for this visit.     Facility-Administered Medications Ordered in Other Visits   Medication    donepezil (ARICEPT) half-tab 2.5 mg    QUEtiapine (SEROquel) quarter-tab 6.25 mg    ramelteon (ROZEREM) tablet 8 mg       REVIEW OF SYSTEMS:   10 point review of systems reviewed and pertinent positives in the HPI.     PHYSICAL EXAMINATION:  Physical Exam     Vital signs: /65   Pulse 88   Temp 98  F (36.7  C)   Resp 17   Ht 1.524 m (5')   Wt 67.7 kg (149 lb 3.2 oz)   SpO2 98%   BMI 29.14 kg/m    General: Awake, sitting up in wheelchair,oriented x1, rambles nonsensically, does not follow commands.   HEENT:Pink conjunctiva,  moist oral mucosa  NECK: Supple  CVS:  S1   S2, without murmur or gallop.   LUNG: Clear to auscultation, No wheezes, rales or rhonci.  BACK: No kyphosis of the thoracic spine  ABDOMEN: Soft, nontender to palpation, with positive bowel sounds  EXTREMITIES: Moves both upper and lower extremities with diffuse weakness, trace pedal edema, no calf tenderness  NEUROLOGIC: Intact, pulses palpable  PSYCHIATRIC: Advanced cognitive impairment.      Labs:  All labs reviewed in the nursing home record and Epic   @  Lab Results   Component Value Date    WBC 11.3 10/04/2023     Lab Results   Component Value Date    RBC 3.81 10/04/2023     Lab Results   Component Value Date    HGB 8.5 10/10/2023     Lab Results   Component Value Date    HCT 38.3 10/04/2023     Lab Results   Component Value Date     10/04/2023     Lab Results   Component Value Date    MCH 32.3 10/04/2023     Lab Results   Component Value Date    MCHC 32.1 10/04/2023     Lab Results   Component Value Date    RDW 12.7 10/04/2023     Lab Results   Component Value Date     10/04/2023        @Last Comprehensive Metabolic Panel:  Sodium   Date Value Ref Range Status   10/30/2023 147 (H) 135 - 145 mmol/L Final     Comment:     Reference intervals for this test were updated on 09/26/2023 to more accurately reflect our healthy population. There may be differences in the flagging of prior results with similar values performed with this method. Interpretation of those prior results can be made in the context of the updated reference intervals.      Potassium   Date Value Ref Range Status   10/30/2023 4.5 3.4 - 5.3 mmol/L Final   04/18/2023 4.2 3.5 - 5.0 mmol/L Final     Chloride   Date Value Ref Range Status   10/30/2023 108 (H) 98 - 107 mmol/L Final   04/18/2023 109 (H) 98 - 107 mmol/L Final     Carbon Dioxide (CO2)   Date Value Ref Range Status   10/30/2023 23 22 - 29 mmol/L Final   04/18/2023 23 22 - 31 mmol/L Final     Anion Gap   Date Value Ref Range Status   10/30/2023 16 (H) 7 - 15 mmol/L Final    04/18/2023 8 5 - 18 mmol/L Final     Glucose   Date Value Ref Range Status   10/30/2023 94 70 - 99 mg/dL Final   04/18/2023 94 70 - 125 mg/dL Final     GLUCOSE BY METER POCT   Date Value Ref Range Status   10/06/2023 124 (H) 70 - 99 mg/dL Final     Urea Nitrogen   Date Value Ref Range Status   10/30/2023 30.3 (H) 8.0 - 23.0 mg/dL Final   04/18/2023 27 8 - 28 mg/dL Final     Creatinine   Date Value Ref Range Status   10/30/2023 1.00 (H) 0.51 - 0.95 mg/dL Final     GFR Estimate   Date Value Ref Range Status   10/30/2023 55 (L) >60 mL/min/1.73m2 Final   07/23/2020 56 (L) >60 mL/min/1.73m2 Final     Calcium   Date Value Ref Range Status   10/30/2023 9.1 8.8 - 10.2 mg/dL Final       This note has been dictated using voice recognition software. Any grammatical or context distortions are unintentional and inherent to the software    Electronically signed by: Wendy Mojica, CNP

## 2023-10-31 NOTE — LETTER
10/31/2023        RE: Shanta Tello  [address] [facility]  [place] [address]        [facility]   Virtual Visit     Code Status:  DNR/DNI   Visit Type:   Chief Complaint   Patient presents with     Discharge from TCU     Facility:  [facility] ([facility]) [[id number]]         HPI: Shanta Tello is a 83 year old female who I am seeing today for discharge from the TCU. Pt recently hospitalized on 10/3/23. Past medical history includes advanced dementia,  hypothyroidism, hyperlipidemia and chronic lower extremity edema. Pt with worsening left leg pain X 2 weeks. No fall or trauma. Hx of left femur s/p cephalomedullary nailing on 4/16/23. Imaging showed intramedullary morena fx with one of the screws in the distal femur metaphysis fractured. Pt s/p removal of intramedullary femur morena and left hip bipolar hemiarthroplasty on 10/5/23. Post operative acute blood loss anemia. Hgb trended down to 8.0. Acute urinary retention which resolved. Dementia with behavioral issues.     Transitional Care Course: Today pt sitting up in wheelchair. Nursing staff present on exam. Pt with advanced dementia with behaviors.Pt continues on Risperdal. She swats at staff with cares or therapies. Attempted to increase Risperdal at the TCU however pt with increased sleepiness. She continues on Aricept and Sertraline. Pt with recent femur fx, s/p ORIF. Pain controlled with tramadol and tylenol.  This is currently scheduled Q 6 hours. Recommendation to attempt weaning at her [facility]. Pt with weight loss of ~ 10 lbs at the TCU. She is assisted with feeding. Rule of infection completed. UA on 10/29/23 appears positive with mucus, leukocytes and bacteria. Cx pending. Pt given Levaquin x 2. Currently a febrile.She is able to walk with assist 100 ft with rolling walker however very inconsistent. Facility did attempt to get more coverage at the TCU however insurance denied her.     Assessment/Plan:    Acute left femur fracture  History  of left femur # s/p cephalomedullary nailing on 4/16/2023;  - intramedullary morena is fracture, s/p hemiarthroplasty on 10/5/23 and screw removal.   - tramadol 25 mg Q 6 hours.  Attempt to wean to prn at [facility].   -Tylenol 975 mg Q 8 hours.    Acute cystisis   -UA 10/29/23 returned with moderate mucus, WBC, leukocytes and bacteria.   -Pt given Levaquin X 2 days. Culture pending.   -monitor for signs and symptoms of infection.   -Encourage fluids.     Failure to thrive  Weight loss   -most likely multifactorial.   -Appears to have UTI.   -CBC, BMP unremarkable.   -TSH now WNL 3.18.   -~10 lb weight loss since admit.   -Advanced dementia with behaviors.     Acute blood loss anemia   -hgb now 11.3.     History of dementia with behavioral issues;  -  sundowning with combativeness.   - Continue PTA BuSpar, Aricept, Risperdal, Zoloft  -Risperdal 0.25 mg Q evening.      History of hypothyroidism;  -On 8/24, TSH 9.82 (before that around 15), pharmacy reported levothyroxine dose increased.   - Recheck TSH 3.18.      History of chronic lower extremity edema;  - Lasix discontinued due to poor oral intake.     Chronic Kidney Disease   -Follow up BMP with creatine of 1.0.     -Ok to discharge to [facility] with current meds and treatments.   -PT, OT, HHA and RN for medication management.   -Follow up with PCP 1 week.   -Follow up with Ortho out pt.       Active Ambulatory Problems     Diagnosis Date Noted     Closed left hip fracture (H) 04/15/2023     Hypothyroidism 04/15/2023     Hyperlipidemia 04/15/2023     Bilateral lower extremity edema 04/15/2023     DNR (do not resuscitate) 04/16/2023     Status post-operative repair of hip fracture 04/16/2023     Closed fracture of trochanter of left femur, sequela 10/03/2023     Resolved Ambulatory Problems     Diagnosis Date Noted     Dementia (H) 04/15/2023     No Additional Past Medical History     Allergies   Allergen Reactions     Alendronate GI Disturbance     Gastrointestinal reflux      Calcitonin GI Disturbance     Gastrointestinal reflux       All Meds and Allergies reviewed in the record at the facility and is the most up-to-date.    Current Outpatient Medications   Medication Sig     acetaminophen (TYLENOL) 325 MG tablet Take 3 tablets (975 mg) by mouth every 8 hours     aspirin 81 MG EC tablet Take 1 tablet (81 mg) by mouth 2 times daily     busPIRone (BUSPAR) 7.5 MG tablet Take 7.5 mg by mouth At Bedtime     calcium carbonate-vitamin D (OSCAL) 500-5 MG-MCG tablet Take 1 tablet by mouth daily     donepezil (ARICEPT) 10 MG ODT Take 10 mg by mouth At Bedtime     furosemide (LASIX) 40 MG tablet Take 40 mg by mouth daily     levofloxacin (LEVAQUIN) 500 MG tablet Take 500 mg by mouth daily     levothyroxine (SYNTHROID/LEVOTHROID) 75 MCG tablet Take 75 mcg by mouth daily     melatonin 5 MG tablet Take 5 mg by mouth At Bedtime     multivitamin, therapeutic (THERA-VIT) TABS tablet Take 1 tablet by mouth daily     nystatin (MYCOSTATIN) 138321 UNIT/GM external powder Apply topically 2 times daily Wash and dry area under breasts before applying powder     ondansetron (ZOFRAN ODT) 4 MG disintegrating tablet [ONDANSETRON (ZOFRAN ODT) 4 MG DISINTEGRATING TABLET] Take 1 tablet (4 mg total) by mouth every 8 (eight) hours as needed for nausea.     risperiDONE (RISPERDAL) 0.25 MG tablet Take 0.25 mg by mouth daily PCP to follow and further GDR.     senna-docusate (SENOKOT-S/PERICOLACE) 8.6-50 MG tablet Take 2 tablets by mouth 2 times daily     sertraline (ZOLOFT) 50 MG tablet Take 50 mg by mouth daily     traMADol (ULTRAM) 50 MG tablet Take 0.5 tablets (25 mg) by mouth every 6 hours     trolamine salicylate (ASPERCREME) 10 % external cream Apply topically 2 times daily     No current facility-administered medications for this visit.     Facility-Administered Medications Ordered in Other Visits   Medication     donepezil (ARICEPT) half-tab 2.5 mg     QUEtiapine (SEROquel) quarter-tab 6.25 mg     ramelteon  (ROZEREM) tablet 8 mg       REVIEW OF SYSTEMS:   10 point review of systems reviewed and pertinent positives in the HPI.     PHYSICAL EXAMINATION:  Physical Exam     Vital signs: /79   Pulse 113   Temp 98.1  F (36.7  C)   Resp 20   Ht 1.524 m (5')   Wt 67.1 kg (148 lb)   SpO2 96%   BMI 28.90 kg/m      General: pt available via phone however unable to participate due to advanced dementia with behaviors. Staff present.       Labs:  All labs reviewed in the nursing home record and Epic   @  Lab Results   Component Value Date    WBC 11.3 10/04/2023     Lab Results   Component Value Date    RBC 3.81 10/04/2023     Lab Results   Component Value Date    HGB 8.5 10/10/2023     Lab Results   Component Value Date    HCT 38.3 10/04/2023     Lab Results   Component Value Date     10/04/2023     Lab Results   Component Value Date    MCH 32.3 10/04/2023     Lab Results   Component Value Date    MCHC 32.1 10/04/2023     Lab Results   Component Value Date    RDW 12.7 10/04/2023     Lab Results   Component Value Date     10/04/2023        @Last Comprehensive Metabolic Panel:  Sodium   Date Value Ref Range Status   10/30/2023 147 (H) 135 - 145 mmol/L Final     Comment:     Reference intervals for this test were updated on 09/26/2023 to more accurately reflect our healthy population. There may be differences in the flagging of prior results with similar values performed with this method. Interpretation of those prior results can be made in the context of the updated reference intervals.      Potassium   Date Value Ref Range Status   10/30/2023 4.5 3.4 - 5.3 mmol/L Final   04/18/2023 4.2 3.5 - 5.0 mmol/L Final     Chloride   Date Value Ref Range Status   10/30/2023 108 (H) 98 - 107 mmol/L Final   04/18/2023 109 (H) 98 - 107 mmol/L Final     Carbon Dioxide (CO2)   Date Value Ref Range Status   10/30/2023 23 22 - 29 mmol/L Final   04/18/2023 23 22 - 31 mmol/L Final     Anion Gap   Date Value Ref Range Status    10/30/2023 16 (H) 7 - 15 mmol/L Final   04/18/2023 8 5 - 18 mmol/L Final     Glucose   Date Value Ref Range Status   10/30/2023 94 70 - 99 mg/dL Final   04/18/2023 94 70 - 125 mg/dL Final     GLUCOSE BY METER POCT   Date Value Ref Range Status   10/06/2023 124 (H) 70 - 99 mg/dL Final     Urea Nitrogen   Date Value Ref Range Status   10/30/2023 30.3 (H) 8.0 - 23.0 mg/dL Final   04/18/2023 27 8 - 28 mg/dL Final     Creatinine   Date Value Ref Range Status   10/30/2023 1.00 (H) 0.51 - 0.95 mg/dL Final     GFR Estimate   Date Value Ref Range Status   10/30/2023 55 (L) >60 mL/min/1.73m2 Final   07/23/2020 56 (L) >60 mL/min/1.73m2 Final     Calcium   Date Value Ref Range Status   10/30/2023 9.1 8.8 - 10.2 mg/dL Final     DISCHARGE PLAN/FACE TO FACE:  I certify that this patient is under my care and that I, or a nurse practitioner or physician's assistant working with me, had a face-to-face encounter that meets the physician face-to-face encounter requirements with this patient.       I certify that, based on my findings, the following services are medically necessary home health services.    My clinical findings support the need for the above skilled services.    This patient is homebound because: Recent fall with femur fx, s/p ORIF.     The patient is, or has been, under my care and I have initiated the establishment of the plan of care. This patient will be followed by a physician who will periodically review the plan of care.    35 minutes spent for this visit which included reviewing plans for discharge as well as collaborating with nursing staff and .     This note has been dictated using voice recognition software. Any grammatical or context distortions are unintentional and inherent to the software    Electronically signed by: Wendy Mojica CNP       Sincerely,        Wendy Mojica NP

## 2023-11-01 NOTE — PROGRESS NOTES
[facility]   Virtual Visit     Code Status:  DNR/DNI   Visit Type:   Chief Complaint   Patient presents with    Discharge from TCU     Facility:  [facility]) [[id number]]         HPI: Shanta Tello is a 83 year old female who I am seeing today for discharge from the TCU. Pt recently hospitalized on 10/3/23. Past medical history includes advanced dementia,  hypothyroidism, hyperlipidemia and chronic lower extremity edema. Pt with worsening left leg pain X 2 weeks. No fall or trauma. Hx of left femur s/p cephalomedullary nailing on 4/16/23. Imaging showed intramedullary morena fx with one of the screws in the distal femur metaphysis fractured. Pt s/p removal of intramedullary femur morena and left hip bipolar hemiarthroplasty on 10/5/23. Post operative acute blood loss anemia. Hgb trended down to 8.0. Acute urinary retention which resolved. Dementia with behavioral issues.     Transitional Care Course: Today pt sitting up in wheelchair. Nursing staff present on exam. Pt with advanced dementia with behaviors.Pt continues on Risperdal. She swats at staff with cares or therapies. Attempted to increase Risperdal at the TCU however pt with increased sleepiness. She continues on Aricept and Sertraline. Pt with recent femur fx, s/p ORIF. Pain controlled with tramadol and tylenol.  This is currently scheduled Q 6 hours. Recommendation to attempt weaning at her [facility] living. Pt with weight loss of ~ 10 lbs at the TCU. She is assisted with feeding. Rule of infection completed. UA on 10/29/23 appears positive with mucus, leukocytes and bacteria. Cx pending. Pt given Levaquin x 2. Currently a febrile.She is able to walk with assist 100 ft with rolling walker however very inconsistent. Facility did attempt to get more coverage at the TCU however insurance denied her.     Assessment/Plan:    Acute left femur fracture  History of left femur # s/p cephalomedullary nailing on 4/16/2023;  - intramedullary morena is  fracture, s/p hemiarthroplasty on 10/5/23 and screw removal.   - tramadol 25 mg Q 6 hours.  Attempt to wean to prn at [facility].   -Tylenol 975 mg Q 8 hours.    Acute cystisis   -UA 10/29/23 returned with moderate mucus, WBC, leukocytes and bacteria.   -Pt given Levaquin X 2 days. Culture pending.   -monitor for signs and symptoms of infection.   -Encourage fluids.     Failure to thrive  Weight loss   -most likely multifactorial.   -Appears to have UTI.   -CBC, BMP unremarkable.   -TSH now WNL 3.18.   -~10 lb weight loss since admit.   -Advanced dementia with behaviors.     Acute blood loss anemia   -hgb now 11.3.     History of dementia with behavioral issues;  -  sundowning with combativeness.   - Continue PTA BuSpar, Aricept, Risperdal, Zoloft  -Risperdal 0.25 mg Q evening.      History of hypothyroidism;  -On 8/24, TSH 9.82 (before that around 15), pharmacy reported levothyroxine dose increased.   - Recheck TSH 3.18.      History of chronic lower extremity edema;  - Lasix discontinued due to poor oral intake.     Chronic Kidney Disease   -Follow up BMP with creatine of 1.0.     -Ok to discharge to [facility] with current meds and treatments.   -PT, OT, HHA and RN for medication management.   -Follow up with PCP 1 week.   -Follow up with Ortho out pt.       Active Ambulatory Problems     Diagnosis Date Noted    Closed left hip fracture (H) 04/15/2023    Hypothyroidism 04/15/2023    Hyperlipidemia 04/15/2023    Bilateral lower extremity edema 04/15/2023    DNR (do not resuscitate) 04/16/2023    Status post-operative repair of hip fracture 04/16/2023    Closed fracture of trochanter of left femur, sequela 10/03/2023     Resolved Ambulatory Problems     Diagnosis Date Noted    Dementia (H) 04/15/2023     No Additional Past Medical History     Allergies   Allergen Reactions    Alendronate GI Disturbance     Gastrointestinal reflux    Calcitonin GI Disturbance     Gastrointestinal reflux       All Meds and Allergies reviewed  in the record at the facility and is the most up-to-date.    Current Outpatient Medications   Medication Sig    acetaminophen (TYLENOL) 325 MG tablet Take 3 tablets (975 mg) by mouth every 8 hours    aspirin 81 MG EC tablet Take 1 tablet (81 mg) by mouth 2 times daily    busPIRone (BUSPAR) 7.5 MG tablet Take 7.5 mg by mouth At Bedtime    calcium carbonate-vitamin D (OSCAL) 500-5 MG-MCG tablet Take 1 tablet by mouth daily    donepezil (ARICEPT) 10 MG ODT Take 10 mg by mouth At Bedtime    furosemide (LASIX) 40 MG tablet Take 40 mg by mouth daily    levofloxacin (LEVAQUIN) 500 MG tablet Take 500 mg by mouth daily    levothyroxine (SYNTHROID/LEVOTHROID) 75 MCG tablet Take 75 mcg by mouth daily    melatonin 5 MG tablet Take 5 mg by mouth At Bedtime    multivitamin, therapeutic (THERA-VIT) TABS tablet Take 1 tablet by mouth daily    nystatin (MYCOSTATIN) 920113 UNIT/GM external powder Apply topically 2 times daily Wash and dry area under breasts before applying powder    ondansetron (ZOFRAN ODT) 4 MG disintegrating tablet [ONDANSETRON (ZOFRAN ODT) 4 MG DISINTEGRATING TABLET] Take 1 tablet (4 mg total) by mouth every 8 (eight) hours as needed for nausea.    risperiDONE (RISPERDAL) 0.25 MG tablet Take 0.25 mg by mouth daily PCP to follow and further GDR.    senna-docusate (SENOKOT-S/PERICOLACE) 8.6-50 MG tablet Take 2 tablets by mouth 2 times daily    sertraline (ZOLOFT) 50 MG tablet Take 50 mg by mouth daily    traMADol (ULTRAM) 50 MG tablet Take 0.5 tablets (25 mg) by mouth every 6 hours    trolamine salicylate (ASPERCREME) 10 % external cream Apply topically 2 times daily     No current facility-administered medications for this visit.     Facility-Administered Medications Ordered in Other Visits   Medication    donepezil (ARICEPT) half-tab 2.5 mg    QUEtiapine (SEROquel) quarter-tab 6.25 mg    ramelteon (ROZEREM) tablet 8 mg       REVIEW OF SYSTEMS:   10 point review of systems reviewed and pertinent positives in the  HPI.     PHYSICAL EXAMINATION:  Physical Exam     Vital signs: /79   Pulse 113   Temp 98.1  F (36.7  C)   Resp 20   Ht 1.524 m (5')   Wt 67.1 kg (148 lb)   SpO2 96%   BMI 28.90 kg/m      General: pt available via phone however unable to participate due to advanced dementia with behaviors. Staff present.       Labs:  All labs reviewed in the nursing home record and Epic   @  Lab Results   Component Value Date    WBC 11.3 10/04/2023     Lab Results   Component Value Date    RBC 3.81 10/04/2023     Lab Results   Component Value Date    HGB 8.5 10/10/2023     Lab Results   Component Value Date    HCT 38.3 10/04/2023     Lab Results   Component Value Date     10/04/2023     Lab Results   Component Value Date    MCH 32.3 10/04/2023     Lab Results   Component Value Date    MCHC 32.1 10/04/2023     Lab Results   Component Value Date    RDW 12.7 10/04/2023     Lab Results   Component Value Date     10/04/2023        @Last Comprehensive Metabolic Panel:  Sodium   Date Value Ref Range Status   10/30/2023 147 (H) 135 - 145 mmol/L Final     Comment:     Reference intervals for this test were updated on 09/26/2023 to more accurately reflect our healthy population. There may be differences in the flagging of prior results with similar values performed with this method. Interpretation of those prior results can be made in the context of the updated reference intervals.      Potassium   Date Value Ref Range Status   10/30/2023 4.5 3.4 - 5.3 mmol/L Final   04/18/2023 4.2 3.5 - 5.0 mmol/L Final     Chloride   Date Value Ref Range Status   10/30/2023 108 (H) 98 - 107 mmol/L Final   04/18/2023 109 (H) 98 - 107 mmol/L Final     Carbon Dioxide (CO2)   Date Value Ref Range Status   10/30/2023 23 22 - 29 mmol/L Final   04/18/2023 23 22 - 31 mmol/L Final     Anion Gap   Date Value Ref Range Status   10/30/2023 16 (H) 7 - 15 mmol/L Final   04/18/2023 8 5 - 18 mmol/L Final     Glucose   Date Value Ref Range Status    10/30/2023 94 70 - 99 mg/dL Final   04/18/2023 94 70 - 125 mg/dL Final     GLUCOSE BY METER POCT   Date Value Ref Range Status   10/06/2023 124 (H) 70 - 99 mg/dL Final     Urea Nitrogen   Date Value Ref Range Status   10/30/2023 30.3 (H) 8.0 - 23.0 mg/dL Final   04/18/2023 27 8 - 28 mg/dL Final     Creatinine   Date Value Ref Range Status   10/30/2023 1.00 (H) 0.51 - 0.95 mg/dL Final     GFR Estimate   Date Value Ref Range Status   10/30/2023 55 (L) >60 mL/min/1.73m2 Final   07/23/2020 56 (L) >60 mL/min/1.73m2 Final     Calcium   Date Value Ref Range Status   10/30/2023 9.1 8.8 - 10.2 mg/dL Final     DISCHARGE PLAN/FACE TO FACE:  I certify that this patient is under my care and that I, or a nurse practitioner or physician's assistant working with me, had a face-to-face encounter that meets the physician face-to-face encounter requirements with this patient.       I certify that, based on my findings, the following services are medically necessary home health services.    My clinical findings support the need for the above skilled services.    This patient is homebound because: Recent fall with femur fx, s/p ORIF.     The patient is, or has been, under my care and I have initiated the establishment of the plan of care. This patient will be followed by a physician who will periodically review the plan of care.    35 minutes spent for this visit which included reviewing plans for discharge as well as collaborating with nursing staff and .     This note has been dictated using voice recognition software. Any grammatical or context distortions are unintentional and inherent to the software    Electronically signed by: Wendy Mojica CNP

## 2024-01-01 ENCOUNTER — LAB REQUISITION (OUTPATIENT)
Dept: LAB | Facility: CLINIC | Age: 84
End: 2024-01-01
Payer: COMMERCIAL

## 2024-01-01 DIAGNOSIS — E78.5 HYPERLIPIDEMIA, UNSPECIFIED: ICD-10-CM

## 2024-01-01 LAB
ANION GAP SERPL CALCULATED.3IONS-SCNC: 9 MMOL/L (ref 7–15)
BUN SERPL-MCNC: 20 MG/DL (ref 8–23)
CALCIUM SERPL-MCNC: 9.1 MG/DL (ref 8.8–10.2)
CHLORIDE SERPL-SCNC: 110 MMOL/L (ref 98–107)
CREAT SERPL-MCNC: 0.9 MG/DL (ref 0.51–0.95)
DEPRECATED HCO3 PLAS-SCNC: 26 MMOL/L (ref 22–29)
EGFRCR SERPLBLD CKD-EPI 2021: 62 ML/MIN/1.73M2
GLUCOSE SERPL-MCNC: 80 MG/DL (ref 70–99)
POTASSIUM SERPL-SCNC: 3.9 MMOL/L (ref 3.4–5.3)
SODIUM SERPL-SCNC: 145 MMOL/L (ref 135–145)

## 2024-01-01 PROCEDURE — 36415 COLL VENOUS BLD VENIPUNCTURE: CPT | Mod: ORL | Performed by: NURSE PRACTITIONER

## 2024-01-01 PROCEDURE — P9604 ONE-WAY ALLOW PRORATED TRIP: HCPCS | Mod: ORL | Performed by: NURSE PRACTITIONER

## 2024-01-01 PROCEDURE — 80048 BASIC METABOLIC PNL TOTAL CA: CPT | Mod: ORL | Performed by: NURSE PRACTITIONER

## (undated) DEVICE — HOOD FLYTE SURGICOOL WITH PEEL AWAY

## (undated) DEVICE — SOL NACL 0.9% IRRIG 1000ML BOTTLE 2F7124

## (undated) DEVICE — SU MONOCRYL 3-0 PS-2 18" UND MCP497G

## (undated) DEVICE — DRAPE STERI U 1015

## (undated) DEVICE — DRESSING MEPILEX BORDER POST-OP 4X8

## (undated) DEVICE — DRAPE STERI TOWEL LG 1010

## (undated) DEVICE — GOWN IMPERVIOUS BREATHABLE SMART XLG 89045

## (undated) DEVICE — GUIDEWIRE BALL TIP 3.0X1000MM 1806-0085S

## (undated) DEVICE — DRILL BIT STRK 4.2X103MM FULL THRD 1806-4280S

## (undated) DEVICE — STPL SKIN PROXIMATE 35 WIDE PMW35

## (undated) DEVICE — SUCTION IRR SYSTEM W/O TIP INTERPULSE HANDPIECE 0210-100-000

## (undated) DEVICE — DRAPE C-ARM 60X42" 1013

## (undated) DEVICE — BONE CEMENT MIXEVAC HI VAC W/CARTRIDGE 0306-563-000

## (undated) DEVICE — PREP CHLORAPREP 26ML TINTED HI-LITE ORANGE 930815

## (undated) DEVICE — CATH TRAY FOLEY SURESTEP 16FR DRAIN BAG STATOCK A899916

## (undated) DEVICE — A3 SUPPLIES- SEE NURSING INFO PAGE

## (undated) DEVICE — SOL NACL 0.9% INJ 1000ML BAG 2B1324X

## (undated) DEVICE — WIRE GUIDE STRK BALL TIP 3X800MM 1806-0080S

## (undated) DEVICE — PADDING CAST 4IN WEBRIL STRL 2502

## (undated) DEVICE — SUTURE STRATAFIX PDS 1 CTX 45CM

## (undated) DEVICE — BONE CLEANING TIP INTERPULSE FEMORAL CANAL 0210-008-000

## (undated) DEVICE — KIT PATIENT CARE HANA TABLE PROFX SUPINE 6855

## (undated) DEVICE — DRSG AQUACEL AG 3.5X14"  422607

## (undated) DEVICE — GLOVE BIOGEL PI SZ 7.0 40870

## (undated) DEVICE — HOLDER LIMB VELCRO OR 0814-1533

## (undated) DEVICE — SUCTION MANIFOLD NEPTUNE 2 SYS 4 PORT 0702-020-000

## (undated) DEVICE — GLOVE BIOGEL PI SZ 8.0 40880

## (undated) DEVICE — DRSG MEPILEX BORDER FLEX 3X3" 595200

## (undated) DEVICE — MAT FLOOR SURGICAL 40X38 0702140238

## (undated) DEVICE — CAST PADDING 4" STERILE 9044S

## (undated) DEVICE — CUSTOM PACK TOTAL HIP SOP5BTHHEA

## (undated) DEVICE — BONE CLEANING TIP INTERPULSE  0210-010-000

## (undated) DEVICE — DRAPE IOBAN INCISE 23X17" 6650EZ

## (undated) DEVICE — SOL WATER IRRIG 1000ML BOTTLE 2F7114

## (undated) DEVICE — ESU PENCIL SMOKE EVAC W/ROCKER SWITCH 0703-047-000

## (undated) DEVICE — Device

## (undated) DEVICE — SUTURE VICRYL+ 0 27IN CT-1 UND VCP260H

## (undated) DEVICE — SUCTION TIP YANKAUER FLEX ORTHO K62

## (undated) DEVICE — PLATE GROUNDING ADULT W/CORD 9165L

## (undated) DEVICE — GLOVE UNDER INDICATOR PI SZ 6.5 LF 41665

## (undated) DEVICE — REAMER SHAFT MODIFIED TRINKLE 8X510MM 0227-8510S

## (undated) DEVICE — GLOVE BIOGEL PI SZ 6.5 40865

## (undated) DEVICE — PACK MINOR SINGLE BASIN SSK3001

## (undated) DEVICE — GLOVE BIOGEL PI INDICATOR 8.0 LF 41680

## (undated) DEVICE — DRAPE IOBAN ISOLATION VERTICAL 320X21CM 6617

## (undated) DEVICE — SUTURE VICRYL+ 2-0 27IN CT-1 UND VCP259H

## (undated) DEVICE — BLADE SAW SAGITTAL STRK WIDE 25.4X85X1.2MM 2108-151-000

## (undated) DEVICE — DRAPE CONVERTORS U-DRAPE 60X72" 8476

## (undated) DEVICE — CUSTOM PACK GEN MAJOR SBA5BGMHEA

## (undated) DEVICE — ESU ELEC BLADE 6" COATED E1450-6

## (undated) DEVICE — DRSG MEPILEX BORDER ADHS 10CMX20CM STRL 496405

## (undated) DEVICE — SUCTION MANIFOLD NEPTUNE 2 SYS 1 PORT 702-025-000

## (undated) DEVICE — SPONGE LAP 18X18" X8435

## (undated) DEVICE — SUTURE MONOCRYL+ 2-0 CT-1 36" UNDYED MCP945H

## (undated) DEVICE — DRSG AQUACEL AG HYDROFIBER  3.5X10" 422605

## (undated) DEVICE — SU ETHIBOND 5 V-37 4X30" MB66G

## (undated) RX ORDER — FENTANYL CITRATE-0.9 % NACL/PF 10 MCG/ML
PLASTIC BAG, INJECTION (ML) INTRAVENOUS
Status: DISPENSED
Start: 2023-01-01

## (undated) RX ORDER — BUPIVACAINE HYDROCHLORIDE 2.5 MG/ML
INJECTION, SOLUTION INFILTRATION; PERINEURAL
Status: DISPENSED
Start: 2023-01-01

## (undated) RX ORDER — DEXAMETHASONE SODIUM PHOSPHATE 4 MG/ML
INJECTION, SOLUTION INTRA-ARTICULAR; INTRALESIONAL; INTRAMUSCULAR; INTRAVENOUS; SOFT TISSUE
Status: DISPENSED
Start: 2023-01-01

## (undated) RX ORDER — FENTANYL CITRATE 50 UG/ML
INJECTION, SOLUTION INTRAMUSCULAR; INTRAVENOUS
Status: DISPENSED
Start: 2023-01-01

## (undated) RX ORDER — GLYCOPYRROLATE 0.2 MG/ML
INJECTION, SOLUTION INTRAMUSCULAR; INTRAVENOUS
Status: DISPENSED
Start: 2023-01-01

## (undated) RX ORDER — DEXAMETHASONE SODIUM PHOSPHATE 10 MG/ML
INJECTION, SOLUTION INTRAMUSCULAR; INTRAVENOUS
Status: DISPENSED
Start: 2023-01-01

## (undated) RX ORDER — PROPOFOL 10 MG/ML
INJECTION, EMULSION INTRAVENOUS
Status: DISPENSED
Start: 2023-01-01

## (undated) RX ORDER — EPHEDRINE SULFATE 50 MG/ML
INJECTION, SOLUTION INTRAMUSCULAR; INTRAVENOUS; SUBCUTANEOUS
Status: DISPENSED
Start: 2023-01-01

## (undated) RX ORDER — BUPIVACAINE HYDROCHLORIDE 5 MG/ML
INJECTION, SOLUTION PERINEURAL
Status: DISPENSED
Start: 2023-01-01

## (undated) RX ORDER — ONDANSETRON 2 MG/ML
INJECTION INTRAMUSCULAR; INTRAVENOUS
Status: DISPENSED
Start: 2023-01-01

## (undated) RX ORDER — LIDOCAINE HYDROCHLORIDE 10 MG/ML
INJECTION, SOLUTION EPIDURAL; INFILTRATION; INTRACAUDAL; PERINEURAL
Status: DISPENSED
Start: 2023-01-01